# Patient Record
Sex: FEMALE | Race: WHITE | NOT HISPANIC OR LATINO | Employment: UNEMPLOYED | URBAN - METROPOLITAN AREA
[De-identification: names, ages, dates, MRNs, and addresses within clinical notes are randomized per-mention and may not be internally consistent; named-entity substitution may affect disease eponyms.]

---

## 2019-06-12 PROBLEM — N64.52 NIPPLE DISCHARGE: Status: ACTIVE | Noted: 2019-06-12

## 2020-08-20 PROBLEM — J96.01 ACUTE HYPOXEMIC RESPIRATORY FAILURE: Status: ACTIVE | Noted: 2020-08-20

## 2020-08-20 PROBLEM — I10 ESSENTIAL HYPERTENSION: Status: ACTIVE | Noted: 2020-08-20

## 2020-08-20 PROBLEM — R73.9 STEROID-INDUCED HYPERGLYCEMIA: Status: ACTIVE | Noted: 2020-08-20

## 2020-08-20 PROBLEM — F17.200 TOBACCO SMOKER WITHIN LAST 12 MONTHS: Status: ACTIVE | Noted: 2020-08-20

## 2020-08-20 PROBLEM — D72.829 LEUKOCYTOSIS: Status: ACTIVE | Noted: 2020-08-20

## 2020-08-20 PROBLEM — T38.0X5A STEROID-INDUCED HYPERGLYCEMIA: Status: ACTIVE | Noted: 2020-08-20

## 2020-08-20 PROBLEM — E66.01 MORBID OBESITY: Status: ACTIVE | Noted: 2020-08-20

## 2020-08-20 PROBLEM — J45.41 MODERATE PERSISTENT ASTHMA WITH EXACERBATION: Status: ACTIVE | Noted: 2020-08-20

## 2020-08-20 PROBLEM — F31.9 BIPOLAR DISORDER: Status: ACTIVE | Noted: 2020-08-20

## 2020-08-21 PROBLEM — D72.829 LEUKOCYTOSIS: Status: RESOLVED | Noted: 2020-08-20 | Resolved: 2020-08-21

## 2021-05-10 ENCOUNTER — PATIENT MESSAGE (OUTPATIENT)
Dept: RESEARCH | Facility: HOSPITAL | Age: 36
End: 2021-05-10

## 2021-07-01 ENCOUNTER — PATIENT MESSAGE (OUTPATIENT)
Dept: ADMINISTRATIVE | Facility: OTHER | Age: 36
End: 2021-07-01

## 2022-07-27 ENCOUNTER — TELEPHONE (OUTPATIENT)
Dept: INFECTIOUS DISEASES | Facility: CLINIC | Age: 37
End: 2022-07-27

## 2022-07-27 NOTE — TELEPHONE ENCOUNTER
Spoke with Ms Esquivel and advised we need a referral from her PCP or a physician who has treated her and also positive test results and treatments that she has had for the parasites that she states she has been treated.

## 2022-07-27 NOTE — TELEPHONE ENCOUNTER
----- Message from Jorge Acevedo sent at 7/27/2022  3:18 PM CDT -----  Type:  Sooner Appointment Request    Caller is requesting a sooner appointment.  Caller declined first available appointment listed below.  Caller will not accept being placed on the waitlist and is requesting a message be sent to doctor.    Name of Caller:  patient  When is the first available appointment?    Symptoms:    Best Call Back Number:  [phone]  Additional Information:  she needs an appointment

## 2023-05-22 ENCOUNTER — HOSPITAL ENCOUNTER (EMERGENCY)
Facility: HOSPITAL | Age: 38
Discharge: HOME | End: 2023-05-23
Attending: EMERGENCY MEDICINE
Payer: MEDICAID

## 2023-05-22 DIAGNOSIS — J45.901 EXACERBATION OF ASTHMA, UNSPECIFIED ASTHMA SEVERITY, UNSPECIFIED WHETHER PERSISTENT: Primary | ICD-10-CM

## 2023-05-22 LAB
ALBUMIN SERPL-MCNC: 3.3 G/DL (ref 3.5–5)
ALBUMIN/GLOB SERPL: 1.2 RATIO (ref 1.1–2)
ALP SERPL-CCNC: 98 UNIT/L (ref 40–150)
ALT SERPL-CCNC: 16 UNIT/L (ref 0–55)
AST SERPL-CCNC: 11 UNIT/L (ref 5–34)
BASOPHILS # BLD AUTO: 0.05 X10(3)/MCL
BASOPHILS NFR BLD AUTO: 0.4 %
BILIRUBIN DIRECT+TOT PNL SERPL-MCNC: 0.2 MG/DL
BUN SERPL-MCNC: 16.1 MG/DL (ref 7–18.7)
CALCIUM SERPL-MCNC: 8.9 MG/DL (ref 8.4–10.2)
CHLORIDE SERPL-SCNC: 107 MMOL/L (ref 98–107)
CO2 SERPL-SCNC: 25 MMOL/L (ref 22–29)
CREAT SERPL-MCNC: 1.33 MG/DL (ref 0.55–1.02)
EOSINOPHIL # BLD AUTO: 0.23 X10(3)/MCL (ref 0–0.9)
EOSINOPHIL NFR BLD AUTO: 1.8 %
ERYTHROCYTE [DISTWIDTH] IN BLOOD BY AUTOMATED COUNT: 13.5 % (ref 11.5–17)
GFR SERPLBLD CREATININE-BSD FMLA CKD-EPI: 54 MLS/MIN/1.73/M2
GLOBULIN SER-MCNC: 2.8 GM/DL (ref 2.4–3.5)
GLUCOSE SERPL-MCNC: 99 MG/DL (ref 74–100)
HCT VFR BLD AUTO: 38.2 % (ref 37–47)
HGB BLD-MCNC: 12.7 G/DL (ref 12–16)
IMM GRANULOCYTES # BLD AUTO: 0.04 X10(3)/MCL (ref 0–0.04)
IMM GRANULOCYTES NFR BLD AUTO: 0.3 %
LIPASE SERPL-CCNC: 20 U/L
LYMPHOCYTES # BLD AUTO: 5.13 X10(3)/MCL (ref 0.6–4.6)
LYMPHOCYTES NFR BLD AUTO: 40.6 %
MCH RBC QN AUTO: 29.7 PG (ref 27–31)
MCHC RBC AUTO-ENTMCNC: 33.2 G/DL (ref 33–36)
MCV RBC AUTO: 89.3 FL (ref 80–94)
MONOCYTES # BLD AUTO: 0.76 X10(3)/MCL (ref 0.1–1.3)
MONOCYTES NFR BLD AUTO: 6 %
NEUTROPHILS # BLD AUTO: 6.42 X10(3)/MCL (ref 2.1–9.2)
NEUTROPHILS NFR BLD AUTO: 50.9 %
NRBC BLD AUTO-RTO: 0 %
PLATELET # BLD AUTO: 358 X10(3)/MCL (ref 130–400)
PMV BLD AUTO: 9.8 FL (ref 7.4–10.4)
POTASSIUM SERPL-SCNC: 3.7 MMOL/L (ref 3.5–5.1)
PROT SERPL-MCNC: 6.1 GM/DL (ref 6.4–8.3)
RBC # BLD AUTO: 4.28 X10(6)/MCL (ref 4.2–5.4)
SODIUM SERPL-SCNC: 140 MMOL/L (ref 136–145)
TROPONIN I SERPL-MCNC: <0.01 NG/ML (ref 0–0.04)
WBC # SPEC AUTO: 12.63 X10(3)/MCL (ref 4.5–11.5)

## 2023-05-22 PROCEDURE — 63600175 PHARM REV CODE 636 W HCPCS: Performed by: EMERGENCY MEDICINE

## 2023-05-22 PROCEDURE — 96372 THER/PROPH/DIAG INJ SC/IM: CPT | Performed by: PHYSICIAN ASSISTANT

## 2023-05-22 PROCEDURE — 25000242 PHARM REV CODE 250 ALT 637 W/ HCPCS: Performed by: PHYSICIAN ASSISTANT

## 2023-05-22 PROCEDURE — 63600175 PHARM REV CODE 636 W HCPCS: Performed by: PHYSICIAN ASSISTANT

## 2023-05-22 PROCEDURE — 80053 COMPREHEN METABOLIC PANEL: CPT | Performed by: PHYSICIAN ASSISTANT

## 2023-05-22 PROCEDURE — 94640 AIRWAY INHALATION TREATMENT: CPT

## 2023-05-22 PROCEDURE — 84484 ASSAY OF TROPONIN QUANT: CPT | Performed by: PHYSICIAN ASSISTANT

## 2023-05-22 PROCEDURE — 99285 EMERGENCY DEPT VISIT HI MDM: CPT | Mod: 25

## 2023-05-22 PROCEDURE — 83690 ASSAY OF LIPASE: CPT | Performed by: PHYSICIAN ASSISTANT

## 2023-05-22 PROCEDURE — 85025 COMPLETE CBC W/AUTO DIFF WBC: CPT | Performed by: PHYSICIAN ASSISTANT

## 2023-05-22 PROCEDURE — 25000003 PHARM REV CODE 250: Performed by: PHYSICIAN ASSISTANT

## 2023-05-22 PROCEDURE — 94761 N-INVAS EAR/PLS OXIMETRY MLT: CPT

## 2023-05-22 RX ORDER — DEXAMETHASONE SODIUM PHOSPHATE 4 MG/ML
10 INJECTION, SOLUTION INTRA-ARTICULAR; INTRALESIONAL; INTRAMUSCULAR; INTRAVENOUS; SOFT TISSUE
Status: COMPLETED | OUTPATIENT
Start: 2023-05-22 | End: 2023-05-22

## 2023-05-22 RX ORDER — METOCLOPRAMIDE 10 MG/1
10 TABLET ORAL
Status: COMPLETED | OUTPATIENT
Start: 2023-05-22 | End: 2023-05-22

## 2023-05-22 RX ORDER — IPRATROPIUM BROMIDE AND ALBUTEROL SULFATE 2.5; .5 MG/3ML; MG/3ML
3 SOLUTION RESPIRATORY (INHALATION)
Status: COMPLETED | OUTPATIENT
Start: 2023-05-22 | End: 2023-05-22

## 2023-05-22 RX ORDER — IPRATROPIUM BROMIDE AND ALBUTEROL SULFATE 2.5; .5 MG/3ML; MG/3ML
3 SOLUTION RESPIRATORY (INHALATION)
Status: DISCONTINUED | OUTPATIENT
Start: 2023-05-22 | End: 2023-05-22

## 2023-05-22 RX ADMIN — IPRATROPIUM BROMIDE AND ALBUTEROL SULFATE 3 ML: .5; 3 SOLUTION RESPIRATORY (INHALATION) at 11:05

## 2023-05-22 RX ADMIN — DEXAMETHASONE SODIUM PHOSPHATE 10 MG: 4 INJECTION, SOLUTION INTRA-ARTICULAR; INTRALESIONAL; INTRAMUSCULAR; INTRAVENOUS; SOFT TISSUE at 11:05

## 2023-05-22 RX ADMIN — IPRATROPIUM BROMIDE AND ALBUTEROL SULFATE 3 ML: .5; 3 SOLUTION RESPIRATORY (INHALATION) at 10:05

## 2023-05-22 RX ADMIN — METOCLOPRAMIDE 10 MG: 10 TABLET ORAL at 11:05

## 2023-05-22 RX ADMIN — PREDNISONE 60 MG: 50 TABLET ORAL at 11:05

## 2023-05-23 VITALS
HEART RATE: 87 BPM | OXYGEN SATURATION: 98 % | DIASTOLIC BLOOD PRESSURE: 91 MMHG | SYSTOLIC BLOOD PRESSURE: 128 MMHG | RESPIRATION RATE: 17 BRPM | BODY MASS INDEX: 51.91 KG/M2 | WEIGHT: 293 LBS | HEIGHT: 63 IN | TEMPERATURE: 98 F

## 2023-05-23 RX ORDER — PREDNISONE 20 MG/1
40 TABLET ORAL DAILY
Qty: 10 TABLET | Refills: 0 | Status: SHIPPED | OUTPATIENT
Start: 2023-05-23 | End: 2023-05-28

## 2023-05-23 RX ORDER — METOCLOPRAMIDE 10 MG/1
10 TABLET ORAL EVERY 6 HOURS PRN
Qty: 8 TABLET | Refills: 0 | Status: SHIPPED | OUTPATIENT
Start: 2023-05-23

## 2023-05-23 RX ORDER — ALBUTEROL SULFATE 90 UG/1
1-2 AEROSOL, METERED RESPIRATORY (INHALATION) EVERY 4 HOURS PRN
Qty: 6.7 G | Refills: 1 | Status: SHIPPED | OUTPATIENT
Start: 2023-05-23

## 2023-05-23 NOTE — ED PROVIDER NOTES
"Encounter Date: 5/22/2023       History     Chief Complaint   Patient presents with    Shortness of Breath     Pt reports to the ed c/o a "asthma attack" (x)30 minutes. Mo75=349% on room air. (+) wheezing. Vomited (x)1 in triage. Vss. nadn     Patient with pmhx of HTN and asthma presents today c/o asthma exacerbation that started just prior to arrival. Patient says she ate dinner at a local chinese buffet tonight and starting vomiting an hour or so after eating. After she was done vomiting she began to wheeze and feel short of breath. She doesn't have her rescue inhaler with her so has not used any of her asthma medication since exacerbation started. Denies fever, chills, abdominal pain, diarrhea, constipation.     The history is provided by the patient. No  was used.   Review of patient's allergies indicates:   Allergen Reactions    Asa [aspirin]     Ultram [tramadol]     Zofran [ondansetron hcl (pf)]      Past Medical History:   Diagnosis Date    Asthma     Hypertension     Manic bipolar I disorder     Mental health disorder     Substance abuse      Past Surgical History:   Procedure Laterality Date    APPENDECTOMY      ESOPHAGOGASTRODUODENOSCOPY N/A 9/15/2022    Procedure: EGD (ESOPHAGOGASTRODUODENOSCOPY);  Surgeon: Law Sheriff MD;  Location: [facility];  Service: Endoscopy;  Laterality: N/A;    HYSTERECTOMY      TONSILLECTOMY       No family history on file.  Social History     Tobacco Use    Smoking status: Former     Packs/day: 1.00     Types: Cigarettes    Smokeless tobacco: Never   Substance Use Topics    Alcohol use: Not Currently    Drug use: Yes     Types: Marijuana     Comment: daily     Review of Systems   Constitutional:  Negative for chills and fever.   Respiratory:  Positive for shortness of breath and wheezing. Negative for cough and chest tightness.    Cardiovascular:  Negative for chest pain, palpitations and leg swelling.   Gastrointestinal:  Positive for nausea and " vomiting. Negative for abdominal pain, constipation and diarrhea.   Genitourinary:  Negative for dysuria, flank pain and hematuria.   Musculoskeletal:  Negative for arthralgias and myalgias.   Skin:  Negative for rash.   Neurological:  Negative for syncope, light-headedness and headaches.   All other systems reviewed and are negative.    Physical Exam     Initial Vitals [05/22/23 2247]   BP Pulse Resp Temp SpO2   (!) 131/109 85 (!) 24 98.4 °F (36.9 °C) 100 %      MAP       --         Physical Exam    Nursing note and vitals reviewed.  Constitutional: She appears well-developed and well-nourished. She is not diaphoretic. No distress. She is not intubated.   HENT:   Head: Normocephalic and atraumatic.   Mouth/Throat: Oropharynx is clear and moist. No oropharyngeal exudate.   Eyes: Conjunctivae and EOM are normal.   Neck: Neck supple.   Normal range of motion.  Cardiovascular:  Normal rate, regular rhythm, normal heart sounds and intact distal pulses.           Pulmonary/Chest: Effort normal. No accessory muscle usage. She is not intubated. No respiratory distress. She has wheezes in the right upper field, the right middle field, the right lower field, the left upper field, the left middle field and the left lower field.   Abdominal: Abdomen is soft. Bowel sounds are normal. She exhibits no distension. There is no abdominal tenderness. There is no rebound.   Musculoskeletal:         General: No edema. Normal range of motion.      Cervical back: Normal range of motion and neck supple.     Neurological: She is alert and oriented to person, place, and time. GCS score is 15. GCS eye subscore is 4. GCS verbal subscore is 5. GCS motor subscore is 6.   Skin: Skin is warm and dry. Capillary refill takes less than 2 seconds. No rash noted.   Psychiatric: She has a normal mood and affect.       ED Course   Procedures  Labs Reviewed   COMPREHENSIVE METABOLIC PANEL - Abnormal; Notable for the following components:       Result  Value    Creatinine 1.33 (*)     Protein Total 6.1 (*)     Albumin Level 3.3 (*)     All other components within normal limits   CBC WITH DIFFERENTIAL - Abnormal; Notable for the following components:    WBC 12.63 (*)     Lymph # 5.13 (*)     All other components within normal limits   TROPONIN I - Normal   LIPASE - Normal   CBC W/ AUTO DIFFERENTIAL    Narrative:     The following orders were created for panel order CBC auto differential.  Procedure                               Abnormality         Status                     ---------                               -----------         ------                     CBC with Differential[[id number]]        Abnormal            Final result                 Please view results for these tests on the individual orders.     EKG Readings: (Independently Interpreted)   Initial Reading: No STEMI. Rhythm: Normal Sinus Rhythm. Heart Rate: 87. Ectopy: No Ectopy. Conduction: Normal. Axis: Normal.     Imaging Results              X-Ray Chest PA And Lateral (Preliminary result)  Result time 05/23/23 00:22:47      Wet Read by HUGO Morales (05/23/23 00:22:47, [facility], Emergency Medicine)    No acute abnormality                                      Medications   predniSONE tablet 60 mg (60 mg Oral Given 5/22/23 2315)   albuterol-ipratropium 2.5 mg-0.5 mg/3 mL nebulizer solution 3 mL (3 mLs Nebulization Given 5/22/23 2300)   dexAMETHasone injection 10 mg (10 mg Intramuscular Given 5/22/23 2311)   metoclopramide HCl tablet 10 mg (10 mg Oral Given 5/22/23 2312)     Medical Decision Making:   Initial Assessment:   Patient with hx of asthma presents today c/o shortness of breath   Differential Diagnosis:   Asthma exacerbation, URI, CAP  Clinical Tests:   Lab Tests: Reviewed  The following lab test(s) were unremarkable: CBC, CMP, Troponin and Lipase  Radiological Study: Reviewed  ED Management:  Duonebsx3, decadron, and reglan  Patient is non-toxic  appearing. Vitals are stable. Symptoms significantly improved after tx in ED and she is feeling better. Wheezing decreased upon re-examination. Will provide patient with rx for rescue inhaler, steroids, and antiemetic. She is going home tomorrow where she has her neb machine and medication. Declined rx for that tonight. Advised her to f/u with pcp. She is stable for discharge. ED precautions given.   Additional MDM:   PERC Rule:   Age is greater than or equal to 50 = 0.0  Heart Rate is greater than or equal to 100 = 0.0  SaO2 on room air < 95% = 0.0  Unilateral leg swelling = 0.0  Hemoptysis = 0.0  Recent surgery or trauma = 0.0  Prior PE or DVT =  0.0  Hormone use = 0.00  PERC Score = 0    Well's Criteria Score:  -Clinical symptoms of DVT (leg swelling, pain with palpation) = 0.0  -Other diagnosis less likely than pulmonary embolism =            0.0  -Heart Rate >100 =   0.0  -Immobilization (= or > than 3 days) or surgery in the previous 4 weeks = 0.0  -Previous DVT/PE = 0.0  -Hemoptysis =          0.0  -Malignancy =           0.0  Well's Probability Score =    0                     Clinical Impression:   Final diagnoses:  [J45.901] Exacerbation of asthma, unspecified asthma severity, unspecified whether persistent (Primary)        ED Disposition Condition    Discharge Stable          ED Prescriptions       Medication Sig Dispense Start Date End Date Auth. Provider    albuterol (PROVENTIL/VENTOLIN HFA) 90 mcg/actuation inhaler Inhale 1-2 puffs into the lungs every 4 (four) hours as needed for Wheezing or Shortness of Breath. Rescue 6.7 g 5/23/2023 -- HUGO Morales    predniSONE (DELTASONE) 20 MG tablet Take 2 tablets (40 mg total) by mouth once daily. for 5 days 10 tablet 5/23/2023 5/28/2023 HUGO Morales    metoclopramide HCl (REGLAN) 10 MG tablet Take 1 tablet (10 mg total) by mouth every 6 (six) hours as needed (nausea). 8 tablet 5/23/2023 -- HUGO Morales          Follow-up  Information       Follow up With Specialties Details Why Contact Info    Jorge Lomeli, GUERAP-C Family Medicine In 2 days  [address] [place] [address]  [phone]      [facility] Emergency Medicine  If symptoms worsen return to ED immediately [address] [facility] [id number] [phone]             HUGO Morales  05/23/23 0109

## 2024-02-24 ENCOUNTER — HOSPITAL ENCOUNTER (EMERGENCY)
Facility: OTHER | Age: 39
Discharge: HOME | End: 2024-02-24
Attending: EMERGENCY MEDICINE
Payer: COMMERCIAL

## 2024-02-24 VITALS
HEART RATE: 82 BPM | TEMPERATURE: 98 F | RESPIRATION RATE: 18 BRPM | OXYGEN SATURATION: 99 % | BODY MASS INDEX: 65.69 KG/M2 | WEIGHT: 293 LBS | SYSTOLIC BLOOD PRESSURE: 138 MMHG | DIASTOLIC BLOOD PRESSURE: 88 MMHG

## 2024-02-24 DIAGNOSIS — G43.109 COMPLICATED MIGRAINE: Primary | ICD-10-CM

## 2024-02-24 DIAGNOSIS — R20.2 PARESTHESIAS: ICD-10-CM

## 2024-02-24 DIAGNOSIS — H54.7 VISION PROBLEM: ICD-10-CM

## 2024-02-24 DIAGNOSIS — R29.818 ACUTE FOCAL NEUROLOGICAL DEFICIT: ICD-10-CM

## 2024-02-24 PROBLEM — R20.0 RIGHT FACIAL NUMBNESS: Status: ACTIVE | Noted: 2024-02-24

## 2024-02-24 LAB
ALBUMIN SERPL BCP-MCNC: 4.1 G/DL (ref 3.5–5.2)
ALP SERPL-CCNC: 105 U/L (ref 55–135)
ALT SERPL W/O P-5'-P-CCNC: 42 U/L (ref 10–44)
ANION GAP SERPL CALC-SCNC: 12 MMOL/L (ref 8–16)
AST SERPL-CCNC: 35 U/L (ref 10–40)
BASOPHILS # BLD AUTO: 0.04 K/UL (ref 0–0.2)
BASOPHILS NFR BLD: 0.4 % (ref 0–1.9)
BILIRUB SERPL-MCNC: 0.6 MG/DL (ref 0.1–1)
BUN SERPL-MCNC: 11 MG/DL (ref 6–20)
CALCIUM SERPL-MCNC: 10.2 MG/DL (ref 8.7–10.5)
CHLORIDE SERPL-SCNC: 107 MMOL/L (ref 95–110)
CHOLEST SERPL-MCNC: 151 MG/DL (ref 120–199)
CHOLEST/HDLC SERPL: 4.2 {RATIO} (ref 2–5)
CO2 SERPL-SCNC: 18 MMOL/L (ref 23–29)
CREAT SERPL-MCNC: 0.9 MG/DL (ref 0.5–1.4)
CREAT SERPL-MCNC: 0.9 MG/DL (ref 0.5–1.4)
DIFFERENTIAL METHOD BLD: NORMAL
EOSINOPHIL # BLD AUTO: 0.1 K/UL (ref 0–0.5)
EOSINOPHIL NFR BLD: 1.1 % (ref 0–8)
ERYTHROCYTE [DISTWIDTH] IN BLOOD BY AUTOMATED COUNT: 13.6 % (ref 11.5–14.5)
EST. GFR  (NO RACE VARIABLE): >60 ML/MIN/1.73 M^2
GLUCOSE SERPL-MCNC: 134 MG/DL (ref 70–110)
HCT VFR BLD AUTO: 44.6 % (ref 37–48.5)
HDLC SERPL-MCNC: 36 MG/DL (ref 40–75)
HDLC SERPL: 23.8 % (ref 20–50)
HGB BLD-MCNC: 15 G/DL (ref 12–16)
IMM GRANULOCYTES # BLD AUTO: 0.02 K/UL (ref 0–0.04)
IMM GRANULOCYTES NFR BLD AUTO: 0.2 % (ref 0–0.5)
INR PPP: 1 (ref 0.8–1.2)
LDLC SERPL CALC-MCNC: 94.8 MG/DL (ref 63–159)
LYMPHOCYTES # BLD AUTO: 3.3 K/UL (ref 1–4.8)
LYMPHOCYTES NFR BLD: 32.9 % (ref 18–48)
MCH RBC QN AUTO: 29.7 PG (ref 27–31)
MCHC RBC AUTO-ENTMCNC: 33.6 G/DL (ref 32–36)
MCV RBC AUTO: 88 FL (ref 82–98)
MONOCYTES # BLD AUTO: 0.6 K/UL (ref 0.3–1)
MONOCYTES NFR BLD: 6.3 % (ref 4–15)
NEUTROPHILS # BLD AUTO: 6 K/UL (ref 1.8–7.7)
NEUTROPHILS NFR BLD: 59.1 % (ref 38–73)
NONHDLC SERPL-MCNC: 115 MG/DL
NRBC BLD-RTO: 0 /100 WBC
PLATELET # BLD AUTO: 404 K/UL (ref 150–450)
PMV BLD AUTO: 9.7 FL (ref 9.2–12.9)
POC PTINR: 1.4 (ref 0.9–1.2)
POCT GLUCOSE: 137 MG/DL (ref 70–110)
POTASSIUM SERPL-SCNC: 4.2 MMOL/L (ref 3.5–5.1)
PROT SERPL-MCNC: 8 G/DL (ref 6–8.4)
PROTHROMBIN TIME: 11.4 SEC (ref 9–12.5)
RBC # BLD AUTO: 5.05 M/UL (ref 4–5.4)
SAMPLE: ABNORMAL
SAMPLE: NORMAL
SODIUM SERPL-SCNC: 137 MMOL/L (ref 136–145)
TRIGL SERPL-MCNC: 101 MG/DL (ref 30–150)
TSH SERPL DL<=0.005 MIU/L-ACNC: 1.48 UIU/ML (ref 0.4–4)
WBC # BLD AUTO: 10.08 K/UL (ref 3.9–12.7)

## 2024-02-24 PROCEDURE — 84443 ASSAY THYROID STIM HORMONE: CPT | Performed by: EMERGENCY MEDICINE

## 2024-02-24 PROCEDURE — 85610 PROTHROMBIN TIME: CPT

## 2024-02-24 PROCEDURE — 80053 COMPREHEN METABOLIC PANEL: CPT | Performed by: EMERGENCY MEDICINE

## 2024-02-24 PROCEDURE — 25500020 PHARM REV CODE 255: Performed by: EMERGENCY MEDICINE

## 2024-02-24 PROCEDURE — 99285 EMERGENCY DEPT VISIT HI MDM: CPT | Mod: 25

## 2024-02-24 PROCEDURE — 82962 GLUCOSE BLOOD TEST: CPT

## 2024-02-24 PROCEDURE — 96374 THER/PROPH/DIAG INJ IV PUSH: CPT

## 2024-02-24 PROCEDURE — 85610 PROTHROMBIN TIME: CPT | Performed by: EMERGENCY MEDICINE

## 2024-02-24 PROCEDURE — 99284 EMERGENCY DEPT VISIT MOD MDM: CPT | Mod: 95,,, | Performed by: PSYCHIATRY & NEUROLOGY

## 2024-02-24 PROCEDURE — 80061 LIPID PANEL: CPT | Performed by: EMERGENCY MEDICINE

## 2024-02-24 PROCEDURE — 96375 TX/PRO/DX INJ NEW DRUG ADDON: CPT

## 2024-02-24 PROCEDURE — 85025 COMPLETE CBC W/AUTO DIFF WBC: CPT | Performed by: EMERGENCY MEDICINE

## 2024-02-24 PROCEDURE — 93010 ELECTROCARDIOGRAM REPORT: CPT | Mod: ,,, | Performed by: INTERNAL MEDICINE

## 2024-02-24 PROCEDURE — 63600175 PHARM REV CODE 636 W HCPCS: Performed by: EMERGENCY MEDICINE

## 2024-02-24 PROCEDURE — 99900035 HC TECH TIME PER 15 MIN (STAT)

## 2024-02-24 PROCEDURE — 36415 COLL VENOUS BLD VENIPUNCTURE: CPT | Performed by: EMERGENCY MEDICINE

## 2024-02-24 PROCEDURE — 93005 ELECTROCARDIOGRAM TRACING: CPT

## 2024-02-24 RX ORDER — PROCHLORPERAZINE EDISYLATE 5 MG/ML
10 INJECTION INTRAMUSCULAR; INTRAVENOUS
Status: COMPLETED | OUTPATIENT
Start: 2024-02-24 | End: 2024-02-24

## 2024-02-24 RX ORDER — DIPHENHYDRAMINE HYDROCHLORIDE 50 MG/ML
25 INJECTION INTRAMUSCULAR; INTRAVENOUS
Status: COMPLETED | OUTPATIENT
Start: 2024-02-24 | End: 2024-02-24

## 2024-02-24 RX ADMIN — IOHEXOL 100 ML: 350 INJECTION, SOLUTION INTRAVENOUS at 10:02

## 2024-02-24 RX ADMIN — DIPHENHYDRAMINE HYDROCHLORIDE 25 MG: 50 INJECTION, SOLUTION INTRAMUSCULAR; INTRAVENOUS at 12:02

## 2024-02-24 RX ADMIN — PROCHLORPERAZINE EDISYLATE 10 MG: 5 INJECTION INTRAMUSCULAR; INTRAVENOUS at 12:02

## 2024-02-24 NOTE — ED PROVIDER NOTES
"Encounter Date: 2/24/2024       History     Chief Complaint   Patient presents with    Numbness     Pt reporting numbness to R side of face with blurred vision to R eye, confusion, dizziness, generalized weakness, "trouble finding words", and nausea onset 8:30 AM     38-year-old female presents as a stroke code.  She reports around 830 this morning she began feeling confused and noticed that her right cheek "felt funny and numb".  She states there is visual loss in the right eye lateral visual fields.  She states she is having difficulty finding her words.  There is mild right-sided headache.  She has no history of hypertension or diabetes or high cholesterol.  No recent trauma or illness.    The history is provided by the patient.     Review of patient's allergies indicates:   Allergen Reactions    Asa [aspirin]     Aspirin Other (See Comments)     avoids due to asthma    Ultram [tramadol]     Ultram [tramadol] Nausea And Vomiting    Zofran [ondansetron hcl (pf)]     Zofran [ondansetron hcl (pf)] Other (See Comments)     Gives migraines     Past Medical History:   Diagnosis Date    Anxiety     Asthma     Bipolar affective disorder, current episode manic with psychotic symptoms     Hypertension     Manic bipolar I disorder     Mental health disorder     PTSD (post-traumatic stress disorder)     Substance abuse      Past Surgical History:   Procedure Laterality Date    APPENDECTOMY      c sections      x 3    CHOLECYSTECTOMY      ESOPHAGOGASTRODUODENOSCOPY N/A 9/15/2022    Procedure: EGD (ESOPHAGOGASTRODUODENOSCOPY);  Surgeon: Law Sheriff MD;  Location: [facility];  Service: Endoscopy;  Laterality: N/A;    HYSTERECTOMY      TONSILLECTOMY      tonsils and adenoidectomy       Family History   Problem Relation Age of Onset    Ovarian cancer Mother     Cancer Other         breast    No Known Problems Father     Diabetes Paternal Grandmother      Social History     Tobacco Use    Smoking status: Every Day     " Current packs/day: 0.50     Types: Cigarettes    Smokeless tobacco: Never   Substance Use Topics    Alcohol use: Never    Drug use: Yes     Types: Marijuana     Comment: daily     Review of Systems   Constitutional:  Negative for chills and fever.   HENT:  Negative for congestion and sore throat.    Eyes:  Positive for visual disturbance.   Respiratory:  Negative for cough and shortness of breath.    Cardiovascular:  Negative for chest pain and palpitations.   Gastrointestinal:  Negative for abdominal pain, diarrhea and vomiting.   Genitourinary:  Negative for decreased urine volume, dysuria and vaginal discharge.   Musculoskeletal:  Negative for joint swelling, neck pain and neck stiffness.   Skin:  Negative for rash and wound.   Neurological:  Positive for speech difficulty, numbness and headaches. Negative for weakness.   Psychiatric/Behavioral:  Positive for confusion.        Physical Exam     Initial Vitals   BP Pulse Resp Temp SpO2   02/24/24 1014 02/24/24 1014 02/24/24 1014 02/24/24 1126 02/24/24 1014   (!) 164/84 98 (!) 24 97.9 °F (36.6 °C) 98 %      MAP       --                Physical Exam    Nursing note and vitals reviewed.  Constitutional: She appears well-developed and well-nourished. She appears distressed (Crying).   Obese.   HENT:   Head: Normocephalic and atraumatic.   Mouth/Throat: Oropharynx is clear and moist. No oropharyngeal exudate.   Eyes: Conjunctivae and EOM are normal. Pupils are equal, round, and reactive to light.   Neck: Neck supple.   Cardiovascular:  Normal rate and normal heart sounds.           No murmur heard.  Pulmonary/Chest: Breath sounds normal. No respiratory distress. She has no wheezes. She has no rhonchi. She has no rales.   Abdominal: Abdomen is soft. There is no abdominal tenderness. There is no rebound and no guarding.   Musculoskeletal:         General: No tenderness or edema.      Cervical back: Neck supple.     Neurological: She is alert and oriented to person,  place, and time. She has normal strength. A sensory deficit (Reports decreased sensation right V2 and V3 dermatomes.) is present. GCS score is 15. GCS eye subscore is 4. GCS verbal subscore is 5. GCS motor subscore is 6.   Reports decreased vision in right lateral visual field- inconsistent on testing.   Skin: Skin is warm and dry. No rash noted.   Psychiatric: Thought content normal. Her mood appears anxious. Her affect is labile. Her speech is delayed (Stuttering).         ED Course   Critical Care    Date/Time: 2/24/2024 12:14 PM    Performed by: Ana Pierre MD  Authorized by: Ana Pierre MD  Direct patient critical care time: 12 minutes  Additional history critical care time: 6 minutes  Ordering / reviewing critical care time: 6 minutes  Documentation critical care time: 6 minutes  Consulting other physicians critical care time: 6 minutes  Total critical care time (exclusive of procedural time) : 36 minutes  Critical care time was exclusive of separately billable procedures and treating other patients.  Critical care was necessary to treat or prevent imminent or life-threatening deterioration of the following conditions: CNS failure or compromise.  Critical care was time spent personally by me on the following activities: blood draw for specimens, development of treatment plan with patient or surrogate, discussions with consultants, interpretation of cardiac output measurements, evaluation of patient's response to treatment, examination of patient, obtaining history from patient or surrogate, ordering and performing treatments and interventions, ordering and review of laboratory studies, ordering and review of radiographic studies, pulse oximetry, re-evaluation of patient's condition and review of old charts.        Labs Reviewed   COMPREHENSIVE METABOLIC PANEL - Abnormal; Notable for the following components:       Result Value    CO2 18 (*)     Glucose 134 (*)     All other components within normal  limits   POCT GLUCOSE - Abnormal; Notable for the following components:    POCT Glucose 137 (*)     All other components within normal limits   ISTAT PROCEDURE - Abnormal; Notable for the following components:    POC PTINR 1.4 (*)     All other components within normal limits   CBC W/ AUTO DIFFERENTIAL   TSH   PROTIME-INR   LIPID PANEL   POCT GLUCOSE, HAND-HELD DEVICE   ISTAT CREATININE     EKG Readings: (Independently Interpreted)   Normal sinus rhythm at rate of 91, no STEMI, no ectopy.       Imaging Results              MRI Brain Without Contrast (Final result)  Result time 02/24/24 14:43:19      Final result by Teo Galeano MD (02/24/24 14:43:19)                   Impression:      Examination compromised by artifact.  Noting this, no definite acute intracranial findings identified.  No evidence of acute ischemia or recent infarction.      Electronically signed by: Teo Galeano  Date:    02/24/2024  Time:    14:43               Narrative:    EXAMINATION:  MRI BRAIN WITHOUT CONTRAST    CLINICAL HISTORY:  Stroke, follow up;    TECHNIQUE:  Multiplanar multisequence MR imaging of the brain was performed without contrast.    COMPARISON:  CTA of the head neck performed 02/24/2024.    FINDINGS:  Comment: Examination related to artifact, reportedly due to patient body habitus and positioning, per MR technologist note.    Parenchyma: There is no restricted diffusion to suggest acute or subacute ischemic infarct.    Additional comments: There is no midline shift, abnormal extra-axial fluid collection, or acute intracranial hemorrhage. The basal cisterns are patent.    Ventricles: Normal.    Flow voids: The normal major intracranial arterial flow voids are visualized.    Sinuses and mastoid air cells: Maxillary sinuses appear relatively hypoplastic.  Paranasal sinuses are better assessed on earlier same day CT imaging.    Orbits: Normal    Midline structures: The pituitary and craniocervical junction are  normal.    Marrow: Normal                                         CTA Head and Neck (xpd) (Final result)  Result time 02/24/24 11:09:07      Final result by Fernie Fernandez MD (02/24/24 11:09:07)                   Impression:      No acute intracranial process.    CTA  mildly limited by motion.    No significant stenosis at the carotid bifurcations by NASCET criteria the vertebral arteries are patent without advanced stenosis.  No evidence of dissection.    No major branch stenosis/occlusion at the [id number].  The  ophthalmic arteries are patent at least proximally.      Electronically signed by: Fernei Fernandez  Date:    02/24/2024  Time:    11:09               Narrative:    EXAMINATION:  CTA HEAD AND NECK (XPD)    CLINICAL HISTORY:  Neuro deficit, acute, stroke suspected;    TECHNIQUE:  Non contrast low dose axial images were obtained through the head.  CT angiogram was performed from the level of the kathe to the top of the head following the IV administration of 100mL of Omnipaque 350.   Sagittal and coronal reconstructions and maximum intensity projection reconstructions were performed. Arterial stenosis percentages are based on NASCET measurement criteria.    3D reformats were created on an independent workstation to evaluate the [id number]    COMPARISON:  None    FINDINGS:  Brain:    There is no evidence of hydrocephalus mass effect intracranial hemorrhage or acute territorial infarct.    No enhancing intracranial lesion is identified.  The orbits are grossly unremarkable.  No evidence of mass in the sella.    CTA:    Mildly limited by motion.    There is no advanced stenosis at the origins of the vessels from the aortic arch.    No advanced stenosis at the origin of the vertebral arteries from the subclavian arteries. No advanced stenosis of the vertebral arteries in the neck.    There is no significant stenosis at the carotid bifurcations by NASCET criteria.  Retropharyngeal course of the  carotid arteries bilaterally.    No evidence of dissection.    Intracranially there is no major branch advanced stenosis/occlusion at the [place].  The ophthalmic arteries are patent at least proximally.    No aneurysm. The venous sinuses are patent.    No soft tissue mass is identified in the neck.    These findings were communicated to Dr. Pierre at 10:59 on 02/24/2024.                                       Medications   iohexoL (OMNIPAQUE 350) injection 100 mL (100 mLs Intravenous Given 2/24/24 1050)   diphenhydrAMINE injection 25 mg (25 mg Intravenous Given 2/24/24 1247)   prochlorperazine injection Soln 10 mg (10 mg Intravenous Given 2/24/24 1247)     Medical Decision Making  Emergent evaluation of 38-year-old female who presents as a stroke code with symptoms of visual field deficit, facial numbness, expressive aphasia.  Vital signs are benign, afebrile.  On exam she is tearful, crying, with stuttering speech.  She reports decreased sensation to light touch over right V2 and V3 dermatomes, but there is no associated facial droop or weakness of the facial muscles.  Difficult to assess visual fields, but reports decreased right lateral.  CTA head and neck shows no acute process or large vessel occlusion.  Patient was seen by telemedicine stroke Neurology Dr. Morris for consultation, and he agrees with me that tPA risk outweighs benefit.  This may represent an atypical migraine or conversion disorder, but unlikely to be a true CVA.  Will check MRI for complete evaluation.    Patient was over MRI weight capacity, transferred to [facility] [place] [place] for MRI and then returned to [facility].  MRI shows no CVA.  Patient's speech symptoms have resolved completely, sensory changes to the face of resolved completely, but she continues to report right eye visual field deficit.  Visual acuity is benign.  She can follow-up with her ophthalmologist this week if symptoms persist, but this may represent part of migraine aura.  I am  comfortable with discharge home and she is advised close follow-up and strict return precautions.    Amount and/or Complexity of Data Reviewed  Labs: ordered. Decision-making details documented in ED Course.     Details: CBC showed no leukocytosis or profound anemia.  Metabolic profile shows no major electrolyte disturbance or renal insufficiency.  TSH is within normal limits.  Coagulation studies are within normal limits.  Radiology: ordered.  Discussion of management or test interpretation with external provider(s): I discussed the case at length with Dr. Mariano - telemedicine stroke neurologist.  I discussed the case briefly with Dr. Church - accepting ER physician at [facility] for MRI transfer.    Risk  Prescription drug management.  Decision regarding hospitalization.                                      Clinical Impression:  Final diagnoses:  [R29.818] Acute focal neurological deficit  [G43.109] Complicated migraine (Primary)  [R20.2] Paresthesias  [H54.7] Vision problem          ED Disposition Condition    Discharge Stable          ED Prescriptions    None       Follow-up Information       Follow up With Specialties Details Why Contact Info    Your regular primary care doctor  Schedule an appointment as soon as possible for a visit  For symptom recheck and close follow-up     [facility] Emergency Medicine  As needed, If symptoms worsen [address]  [facility] [id number] [phone]             Ana Pierre MD  02/24/24 [id number]

## 2024-02-24 NOTE — ED TRIAGE NOTES
Pt presents to the ED with c/o expressive asphasia, dizziness, blurred vision, numbness to right side, nausea, onset 8:30 am today. Pt able to move bilateral arms, no vomiting. Pt AAOx3, NAD noted.

## 2024-02-24 NOTE — SUBJECTIVE & OBJECTIVE
HPI:  38 y.o. female with R facial numbness, RUE heaviness, word-finding difficulty and blurry vision   There is now a right sided, throbbing headache, nausea, +photo/phonophobia and vertigo.  No history of similar symptoms. Has remote migraine.       Images personally reviewed and interpreted:  Study: Head CT and CTA Head & Neck  Study Interpretation: nl     BP (!) 164/84   Pulse 98   Resp (!) 24   Wt (!) 168.2 kg (370 lb 13 oz)   SpO2 98%   BMI 65.69 kg/m²   Some stuttering/dysfluency with nl naming  Poor effort.  Protects her face with RUE    Assessment and plan:  Suspected migraine with embellishment.  Conversion d/o and brainstem ischemic stroke in DDx.  My index of suspicion of the latter is very low given the embellishment on exam so I do not recommend thrombolytic therapy without MRI confirmation.  We will proceed with stat MRI brain.  Please call me back if abnl.  In the interim, please treat for migraine (cocktail of choice).    Lytics recommendation: Thrombolytic therapy not recommended due to Suspected stroke mimic  and Mild Non-Disabling Symptoms  Thrombectomy recommendation: No; no significant neurologic deficit (NIHSS <6)  and No; at this time symptoms not suggestive of large vessel occlusion  Placement recommendation: pending further studies

## 2024-02-24 NOTE — TELEMEDICINE CONSULT
[facility]  Vascular Neurology  Comprehensive Stroke Center  TeleVascular Neurology Acute Consultation Note        Consult Information  Consults    Consulting Provider: SAPNA RINALDI   Current Providers  No providers found    Patient Location:  [facility] EMERGENCY DEPARTMENT Emergency Department    Spoke hospital nurse at bedside with patient assisting consultant.  Patient information was obtained from patient.       Stroke Documentation  Acute Stroke Times   Last Known Normal Date: 02/24/24  Last Known Normal Time: 0830  Stroke Team Arrival Date: 02/24/24  Stroke Team Arrival Time: 1021  CT Interpretation Time: 1056    NIH Scale:  Interval: baseline  1a. Level of Consciousness: 0-->Alert, keenly responsive  1b. LOC Questions: 0-->Answers both questions correctly  1c. LOC Commands: 0-->Performs both tasks correctly  2. Best Gaze: 0-->Normal  3. Visual: 0-->No visual loss  4. Facial Palsy: 0-->Normal symmetrical movements  5a. Motor Arm, Left: 0-->No drift, limb holds 90 (or 45) degrees for full 10 secs  5b. Motor Arm, Right: 1-->Drift, limb holds 90 (or 45) degrees, but drifts down before full 10 secs, does not hit bed or other support  6a. Motor Leg, Left: 1-->Drift, leg falls by the end of the 5-sec period but does not hit bed  6b. Motor Leg, Right: 1-->Drift, leg falls by the end of the 5-sec period but does not hit bed  7. Limb Ataxia: 0-->Absent  8. Sensory: 1-->Mild-to-moderate sensory loss, patient feels pinprick is less sharp or is dull on the affected side, or there is a loss of superficial pain with pinprick, but patient is aware of being touched (decreased on R)  9. Best Language: 1-->Mild-to-moderate aphasia, some obvious loss of fluency or facility of comprehension, without significant limitation on ideas expressed or form of expression. Reduction of speech and/or comprehension, however, makes conversation. . . (see row details)  10. Dysarthria: 0-->Normal  11. Extinction and  Inattention (formerly Neglect): 0-->No abnormality  Total (NIH Stroke Scale): 5      Modified [place]:    [place] Coma Scale:     ABCD2 Score:    [id number] Score:    HAS -BLED Score:    ICH Score:    Hunt & Harrell Classification:      Blood pressure (!) 164/84, pulse 98, resp. rate (!) 24, weight (!) 168.2 kg (370 lb 13 oz), SpO2 98 %.    Van Positive    Medical Decision Making  HPI:  38 y.o. female with R facial numbness, RUE heaviness, word-finding difficulty and blurry vision   There is now a right sided, throbbing headache, nausea, +photo/phonophobia and vertigo.  No history of similar symptoms. Has remote migraine.       Images personally reviewed and interpreted:  Study: Head CT and CTA Head & Neck  Study Interpretation: nl     BP (!) 164/84   Pulse 98   Resp (!) 24   Wt (!) 168.2 kg (370 lb 13 oz)   SpO2 98%   BMI 65.69 kg/m²   Some stuttering/dysfluency with nl naming  Poor effort.  Protects her face with RUE    Assessment and plan:  Suspected migraine with embellishment.  Conversion d/o and brainstem ischemic stroke in DDx.  My index of suspicion of the latter is very low given the embellishment on exam so I do not recommend thrombolytic therapy without MRI confirmation.  We will proceed with stat MRI brain.  Please call me back if abnl.  In the interim, please treat for migraine (cocktail of choice).    Lytics recommendation: Thrombolytic therapy not recommended due to Suspected stroke mimic  and Mild Non-Disabling Symptoms  Thrombectomy recommendation: No; no significant neurologic deficit (NIHSS <6)  and No; at this time symptoms not suggestive of large vessel occlusion  Placement recommendation: pending further studies               ROS  Physical Exam  Past Medical History:   Diagnosis Date    Anxiety     Asthma     Bipolar affective disorder, current episode manic with psychotic symptoms     Hypertension     Manic bipolar I disorder     Mental health disorder     PTSD (post-traumatic stress  disorder)     Substance abuse      Past Surgical History:   Procedure Laterality Date    APPENDECTOMY      c sections      x 3    CHOLECYSTECTOMY      ESOPHAGOGASTRODUODENOSCOPY N/A 9/15/2022    Procedure: EGD (ESOPHAGOGASTRODUODENOSCOPY);  Surgeon: Law Sheriff MD;  Location: [facility];  Service: Endoscopy;  Laterality: N/A;    HYSTERECTOMY      TONSILLECTOMY      tonsils and adenoidectomy       Family History   Problem Relation Age of Onset    Ovarian cancer Mother     Cancer Other         breast    No Known Problems Father     Diabetes Paternal Grandmother        Diagnoses  Problem Noted   Right Facial Numbness 2/24/2024       Dustin Garcia MD      Emergent/Acute neurological consultation requested by spoke provider due to critical concerns for possible cerebrovascular event that could result in permanent loss of neurologic/bodily function, severe disability or death of this patient.  Immediate/timely evaluation by a highly prepared expert is paramount for optimal outcomes  High risk for neurological deterioration if not properly diagnosed  High risk for neurological deterioration if not treated promplty/as soon as possible  Complex diagnostic evaluation may be required (advanced imaging)  High risk treatment options (thrombolytics and/or thrombectomy)    Patient care was coordinated with spoke provider, including but not limted to    Discussing likely diagnosis/etiology of symptoms  Making recommendations for further diagnostic studies  Making recommendations for intravenous thrombolytics or other advanced therapies  Making recommendations for disposition (admission/transfer for higher level of care)

## 2024-02-25 LAB
OHS QRS DURATION: 84 MS
OHS QTC CALCULATION: 452 MS

## 2024-06-19 ENCOUNTER — HOSPITAL ENCOUNTER (EMERGENCY)
Facility: HOSPITAL | Age: 39
Discharge: HOME | End: 2024-06-19
Attending: EMERGENCY MEDICINE
Payer: COMMERCIAL

## 2024-06-19 ENCOUNTER — HOSPITAL ENCOUNTER (EMERGENCY)
Facility: HOSPITAL | Age: 39
Discharge: LEFT BEFORE DISCHARGE | End: 2024-06-19
Payer: COMMERCIAL

## 2024-06-19 VITALS
SYSTOLIC BLOOD PRESSURE: 135 MMHG | HEIGHT: 63 IN | OXYGEN SATURATION: 98 % | DIASTOLIC BLOOD PRESSURE: 88 MMHG | RESPIRATION RATE: 20 BRPM | BODY MASS INDEX: 42.52 KG/M2 | WEIGHT: 240 LBS | HEART RATE: 92 BPM | TEMPERATURE: 98 F

## 2024-06-19 VITALS
SYSTOLIC BLOOD PRESSURE: 168 MMHG | HEIGHT: 63 IN | OXYGEN SATURATION: 98 % | WEIGHT: 280 LBS | RESPIRATION RATE: 18 BRPM | HEART RATE: 81 BPM | BODY MASS INDEX: 49.61 KG/M2 | DIASTOLIC BLOOD PRESSURE: 95 MMHG | TEMPERATURE: 99 F

## 2024-06-19 DIAGNOSIS — R06.2 WHEEZING: ICD-10-CM
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
## 2024-06-19 DIAGNOSIS — S90.862A INSECT BITE OF LEFT FOOT, INITIAL ENCOUNTER: Primary | ICD-10-CM

## 2024-06-19 DIAGNOSIS — L02.91 ABSCESS: ICD-10-CM

## 2024-06-19 PROCEDURE — 99281 EMR DPT VST MAYX REQ PHY/QHP: CPT | Mod: 25

## 2024-06-19 PROCEDURE — 99900031 HC PATIENT EDUCATION (STAT)

## 2024-06-19 PROCEDURE — 25000242 PHARM REV CODE 250 ALT 637 W/ HCPCS: Performed by: EMERGENCY MEDICINE

## 2024-06-19 PROCEDURE — 90715 TDAP VACCINE 7 YRS/> IM: CPT | Performed by: EMERGENCY MEDICINE

## 2024-06-19 PROCEDURE — 63600175 PHARM REV CODE 636 W HCPCS: Performed by: EMERGENCY MEDICINE

## 2024-06-19 PROCEDURE — 99900035 HC TECH TIME PER 15 MIN (STAT)

## 2024-06-19 PROCEDURE — 90471 IMMUNIZATION ADMIN: CPT | Performed by: EMERGENCY MEDICINE

## 2024-06-19 PROCEDURE — 94640 AIRWAY INHALATION TREATMENT: CPT

## 2024-06-19 PROCEDURE — 94761 N-INVAS EAR/PLS OXIMETRY MLT: CPT

## 2024-06-19 PROCEDURE — 94799 UNLISTED PULMONARY SVC/PX: CPT

## 2024-06-19 PROCEDURE — 25000003 PHARM REV CODE 250: Performed by: PHYSICIAN ASSISTANT

## 2024-06-19 PROCEDURE — 25000003 PHARM REV CODE 250: Performed by: EMERGENCY MEDICINE

## 2024-06-19 PROCEDURE — 99284 EMERGENCY DEPT VISIT MOD MDM: CPT | Mod: 25

## 2024-06-19 PROCEDURE — 96372 THER/PROPH/DIAG INJ SC/IM: CPT | Performed by: EMERGENCY MEDICINE

## 2024-06-19 RX ORDER — MUPIROCIN 20 MG/G
OINTMENT TOPICAL 2 TIMES DAILY
Qty: 15 G | Refills: 0 | Status: SHIPPED | OUTPATIENT
Start: 2024-06-19

## 2024-06-19 RX ORDER — DEXAMETHASONE SODIUM PHOSPHATE 4 MG/ML
8 INJECTION, SOLUTION INTRA-ARTICULAR; INTRALESIONAL; INTRAMUSCULAR; INTRAVENOUS; SOFT TISSUE
Status: COMPLETED | OUTPATIENT
Start: 2024-06-19 | End: 2024-06-19

## 2024-06-19 RX ORDER — IBUPROFEN 400 MG/1
400 TABLET ORAL
Status: COMPLETED | OUTPATIENT
Start: 2024-06-19 | End: 2024-06-19

## 2024-06-19 RX ORDER — FAMOTIDINE 20 MG/1
20 TABLET, FILM COATED ORAL
Status: DISCONTINUED | OUTPATIENT
Start: 2024-06-19 | End: 2024-06-20 | Stop reason: HOSPADM

## 2024-06-19 RX ORDER — HYDROCODONE BITARTRATE AND ACETAMINOPHEN 5; 325 MG/1; MG/1
1 TABLET ORAL
Status: COMPLETED | OUTPATIENT
Start: 2024-06-19 | End: 2024-06-19

## 2024-06-19 RX ORDER — SULFAMETHOXAZOLE AND TRIMETHOPRIM 800; 160 MG/1; MG/1
1 TABLET ORAL 2 TIMES DAILY
Qty: 14 TABLET | Refills: 0 | Status: SHIPPED | OUTPATIENT
Start: 2024-06-19 | End: 2024-06-26

## 2024-06-19 RX ORDER — IPRATROPIUM BROMIDE AND ALBUTEROL SULFATE 2.5; .5 MG/3ML; MG/3ML
3 SOLUTION RESPIRATORY (INHALATION)
Status: COMPLETED | OUTPATIENT
Start: 2024-06-19 | End: 2024-06-19

## 2024-06-19 RX ORDER — FAMOTIDINE 20 MG/1
20 TABLET, FILM COATED ORAL 2 TIMES DAILY
Status: DISCONTINUED | OUTPATIENT
Start: 2024-06-19 | End: 2024-06-19 | Stop reason: HOSPADM

## 2024-06-19 RX ORDER — DIPHENHYDRAMINE HCL 25 MG
25 CAPSULE ORAL
Status: DISCONTINUED | OUTPATIENT
Start: 2024-06-19 | End: 2024-06-19 | Stop reason: HOSPADM

## 2024-06-19 RX ORDER — DIPHENHYDRAMINE HCL 25 MG
25 CAPSULE ORAL
Status: COMPLETED | OUTPATIENT
Start: 2024-06-19 | End: 2024-06-19

## 2024-06-19 RX ORDER — MUPIROCIN 20 MG/G
OINTMENT TOPICAL
Status: COMPLETED | OUTPATIENT
Start: 2024-06-19 | End: 2024-06-19

## 2024-06-19 RX ADMIN — CLOSTRIDIUM TETANI TOXOID ANTIGEN (FORMALDEHYDE INACTIVATED), CORYNEBACTERIUM DIPHTHERIAE TOXOID ANTIGEN (FORMALDEHYDE INACTIVATED), BORDETELLA PERTUSSIS TOXOID ANTIGEN (GLUTARALDEHYDE INACTIVATED), BORDETELLA PERTUSSIS FILAMENTOUS HEMAGGLUTININ ANTIGEN (FORMALDEHYDE INACTIVATED), BORDETELLA PERTUSSIS PERTACTIN ANTIGEN, AND BORDETELLA PERTUSSIS FIMBRIAE 2/3 ANTIGEN 0.5 ML: 5; 2; 2.5; 5; 3; 5 INJECTION, SUSPENSION INTRAMUSCULAR at 09:06

## 2024-06-19 RX ADMIN — DEXAMETHASONE SODIUM PHOSPHATE 8 MG: 4 INJECTION, SOLUTION INTRA-ARTICULAR; INTRALESIONAL; INTRAMUSCULAR; INTRAVENOUS; SOFT TISSUE at 08:06

## 2024-06-19 RX ADMIN — DIPHENHYDRAMINE HYDROCHLORIDE 25 MG: 25 CAPSULE ORAL at 07:06

## 2024-06-19 RX ADMIN — IBUPROFEN 400 MG: 400 TABLET ORAL at 07:06

## 2024-06-19 RX ADMIN — HYDROCODONE BITARTRATE AND ACETAMINOPHEN 1 TABLET: 5; 325 TABLET ORAL at 08:06

## 2024-06-19 RX ADMIN — MUPIROCIN 1 G: 20 OINTMENT TOPICAL at 08:06

## 2024-06-19 RX ADMIN — IPRATROPIUM BROMIDE AND ALBUTEROL SULFATE 3 ML: .5; 3 SOLUTION RESPIRATORY (INHALATION) at 08:06

## 2024-06-19 NOTE — FIRST PROVIDER EVALUATION
Emergency Department TeleTriage Encounter Note      CHIEF COMPLAINT    Chief Complaint   Patient presents with    Wound Check     Abscess to right side    Allergic Reaction     To ant bite to left foot       VITAL SIGNS   Initial Vitals [06/19/24 1802]   BP Pulse Resp Temp SpO2   135/88 92 20 98.4 °F (36.9 °C) 98 %      MAP       --            ALLERGIES    Review of patient's allergies indicates:   Allergen Reactions    Asa [aspirin]     Aspirin Other (See Comments)     avoids due to asthma    Ultram [tramadol]     Ultram [tramadol] Nausea And Vomiting    Zofran [ondansetron hcl (pf)]     Zofran [ondansetron hcl (pf)] Other (See Comments)     Gives migraines       PROVIDER TRIAGE NOTE  Patient presents with abscess to right flank and abscess to lower abdomen. Also reports any bite to foot.       ORDERS  Labs Reviewed - No data to display    ED Orders (720h ago, onward)      None              Virtual Visit Note: The provider triage portion of this emergency department evaluation and documentation was performed via [employer], a HIPAA-compliant telemedicine application, in concert with a tele-presenter in the room. A face to face patient evaluation with one of my colleagues will occur once the patient is placed in an emergency department room.      DISCLAIMER: This note was prepared with [employer] voice recognition transcription software. Garbled syntax, mangled pronouns, and other bizarre constructions may be attributed to that software system.

## 2024-06-20 NOTE — FIRST PROVIDER EVALUATION
Emergency Department TeleTriage Encounter Note      CHIEF COMPLAINT    Chief Complaint   Patient presents with    Insect Bite     BIT BY ANT EARLIER TODAY    Abscess     L SIDE CHEST X 1 DAY    Wheezing       VITAL SIGNS   Initial Vitals [06/19/24 1843]   BP Pulse Resp Temp SpO2   (!) 168/95 95 20 99.4 °F (37.4 °C) 97 %      MAP       --            ALLERGIES    Review of patient's allergies indicates:   Allergen Reactions    Asa [aspirin]     Aspirin Other (See Comments)     avoids due to asthma    Ultram [tramadol]     Ultram [tramadol] Nausea And Vomiting    Zofran [ondansetron hcl (pf)]     Zofran [ondansetron hcl (pf)] Other (See Comments)     Gives migraines       PROVIDER TRIAGE NOTE  Patient was seen in Tele Triage by me at another campus. She left that campus because she was told she had to go back to the waiting room (per patient). She did not receive meds that were ordered. She has multiple abscesses and ant bite to foot from yesterday.       ORDERS  Labs Reviewed - No data to display    ED Orders (720h ago, onward)      None              Virtual Visit Note: The provider triage portion of this emergency department evaluation and documentation was performed via [employer], a HIPAA-compliant telemedicine application, in concert with a tele-presenter in the room. A face to face patient evaluation with one of my colleagues will occur once the patient is placed in an emergency department room.      DISCLAIMER: This note was prepared with [employer] voice recognition transcription software. Garbled syntax, mangled pronouns, and other bizarre constructions may be attributed to that software system.    
no

## 2024-06-20 NOTE — ED PROVIDER NOTES
Encounter Date: 6/19/2024       History     Chief Complaint   Patient presents with    Insect Bite     BIT BY ANT EARLIER TODAY    Abscess     L SIDE CHEST X 1 DAY    Wheezing     HPI  Pt w/ PMHx asthma, Bipolar D/O presents to ED with multiple complaints.  She complains of ant bites to her left dorsal foot sustained today which caused her to have wheezing and itching.  States she is allergic to fire ants.  She also complains of painful abscess to her left flank region and mid abdomen that have been present for 2 days.  Denies any known fever or drainage.  States she has had these recurrently in the past.  She denies recent antibiotic use.      Review of patient's allergies indicates:   Allergen Reactions    Asa [aspirin]     Aspirin Other (See Comments)     avoids due to asthma    Ultram [tramadol]     Ultram [tramadol] Nausea And Vomiting    Zofran [ondansetron hcl (pf)]     Zofran [ondansetron hcl (pf)] Other (See Comments)     Gives migraines     Past Medical History:   Diagnosis Date    Anxiety     Asthma     Bipolar affective disorder, current episode manic with psychotic symptoms     Hypertension     Manic bipolar I disorder     Mental health disorder     PTSD (post-traumatic stress disorder)     Substance abuse      Past Surgical History:   Procedure Laterality Date    APPENDECTOMY      c sections      x 3    CHOLECYSTECTOMY      ESOPHAGOGASTRODUODENOSCOPY N/A 9/15/2022    Procedure: EGD (ESOPHAGOGASTRODUODENOSCOPY);  Surgeon: Law Sheriff MD;  Location: [facility];  Service: Endoscopy;  Laterality: N/A;    HYSTERECTOMY      TONSILLECTOMY      tonsils and adenoidectomy       Family History   Problem Relation Name Age of Onset    Ovarian cancer Mother      Cancer Other m great gm         breast    No Known Problems Father      Diabetes Paternal Grandmother       Social History     Tobacco Use    Smoking status: Every Day     Current packs/day: 0.50     Types: Cigarettes    Smokeless tobacco: Never    Substance Use Topics    Alcohol use: Never    Drug use: Yes     Types: Marijuana     Comment: daily     Review of Systems   Constitutional:  Negative for fever.   HENT:  Negative for sore throat.    Respiratory:  Positive for wheezing. Negative for shortness of breath.    Cardiovascular:  Negative for chest pain.   Gastrointestinal:  Negative for nausea.   Genitourinary:  Negative for dysuria.   Musculoskeletal:  Negative for back pain.   Skin:  Positive for rash.   Allergic/Immunologic: Positive for environmental allergies.   Neurological:  Negative for weakness.   Hematological:  Does not bruise/bleed easily.       Physical Exam     Initial Vitals [06/19/24 1843]   BP Pulse Resp Temp SpO2   (!) 168/95 95 20 99.4 °F (37.4 °C) 97 %      MAP       --         Physical Exam    Nursing note and vitals reviewed.  Constitutional: She appears well-developed. No distress.   Morbidly obese   HENT:   Head: Normocephalic and atraumatic.   Mouth/Throat: Oropharynx is clear and moist.   Eyes: EOM are normal. Pupils are equal, round, and reactive to light.   Neck: Neck supple.   Normal range of motion.  Cardiovascular:  Normal rate and regular rhythm.           Pulmonary/Chest: No respiratory distress. She has wheezes (Bilateral inspiratory/expiratory wheezing).   Abdominal: Abdomen is soft. There is no abdominal tenderness. There is no rebound and no guarding.   Musculoskeletal:         General: No tenderness or edema. Normal range of motion.      Cervical back: Normal range of motion and neck supple.     Neurological: She is alert and oriented to person, place, and time. She has normal strength. No cranial nerve deficit. GCS score is 15. GCS eye subscore is 4. GCS verbal subscore is 5. GCS motor subscore is 6.   Skin: Skin is warm and dry. Capillary refill takes less than 2 seconds.   Patient has small superficial abscess with minimal surrounding erythema to the left flank and left mid abdomen.  No spontaneous drainage noted.   There is minimal induration without fluctuance.  There is a small blister with clear fluid with minimal surrounding erythema to the left dorsal hindfoot.   Psychiatric: She has a normal mood and affect. Thought content normal.         ED Course   Procedures  Labs Reviewed - No data to display       Imaging Results    None          Medications   diphenhydrAMINE capsule 25 mg (25 mg Oral Given 6/19/24 1923)   ibuprofen tablet 400 mg (400 mg Oral Given 6/19/24 1923)   albuterol-ipratropium 2.5 mg-0.5 mg/3 mL nebulizer solution 3 mL (3 mLs Nebulization Given 6/19/24 2019)   dexAMETHasone injection 8 mg (8 mg Intramuscular Given 6/19/24 2038)   HYDROcodone-acetaminophen 5-325 mg per tablet 1 tablet (1 tablet Oral Given 6/19/24 2038)   mupirocin 2 % ointment (1 g Topical (Top) Given 6/19/24 2038)   Tdap vaccine injection 0.5 mL (0.5 mLs Intramuscular Given 6/19/24 2143)     Medical Decision Making  Patient presents to ED as above.  Vitals stable.  Differential includes but not limited to allergic reaction, localized reaction to insect bite, abscess, cellulitis, asthma exacerbation.  The lesion to the patient's foot appears most consistent with an insect bite with localized reaction.  She does have some early abscesses to the trunk that are not yet ready for I&D.  Patient given a DuoNeb here with resolution of her wheezing.  She was additionally given Benadryl, ibuprofen, dexamethasone injection and Norco with improvement in symptoms.  Topical Bactroban applied to lesions.  Will discharge with a course of Bactroban, Bactrim.  Patient states she is adequate inhalers at home.  She may continue oral Benadryl or Zyrtec as needed, as directed for itching.  Recommend PCP follow up for recheck.  ED return precautions discussed and provided.    Amount and/or Complexity of Data Reviewed  External Data Reviewed: notes.    Risk  OTC drugs.  Prescription drug management.                                      Clinical  Impression:  Final diagnoses:  [S90.862A, W57.XXXA] Insect bite of left foot, initial encounter (Primary)  [L02.91] Abscess  [R06.2] Wheezing          ED Disposition Condition    Discharge Stable          ED Prescriptions       Medication Sig Dispense Start Date End Date Auth. Provider    mupirocin (BACTROBAN) 2 % ointment Apply topically 2 (two) times daily. 15 g 6/19/2024 -- Denise Estrada MD    sulfamethoxazole-trimethoprim 800-160mg (BACTRIM DS) 800-160 mg Tab Take 1 tablet by mouth 2 (two) times daily. for 7 days 14 tablet 6/19/2024 6/26/2024 Denise Estrada MD          Follow-up Information       Follow up With Specialties Details Why Contact Info Additional Information    Jorge Lomeli, FNP-C Family Medicine Schedule an appointment as soon as possible for a visit   [address] [place] [address]  [phone]       [facility] - Emergency Dept Emergency Medicine  As needed, If symptoms worsen [address] [facility] [id number] [phone] 1st floor             Denise Estrada MD  06/20/24 5426

## 2024-06-20 NOTE — CARE UPDATE
06/19/24 2019   Patient Assessment/Suction   Level of Consciousness (AVPU) alert   Respiratory Effort Unlabored;Short of breath   Expansion/Accessory Muscles/Retractions no use of accessory muscles   All Lung Fields Breath Sounds Anterior:;diminished;wheezes, expiratory   Rhythm/Pattern, Respiratory depth regular;pattern regular   Cough Frequency infrequent   Cough Type nonproductive   PRE-TX-O2   Device (Oxygen Therapy) room air   SpO2 98 %   Pulse Oximetry Type Intermittent   $ Pulse Oximetry - Multiple Charge Pulse Oximetry - Multiple   Pulse 81   Resp 20   Aerosol Therapy   $ Aerosol Therapy Charges Aerosol Treatment   Daily Review of Necessity (SVN) completed   Respiratory Treatment Status (SVN) given   Treatment Route (SVN) mask   Patient Position HOB elevated   Post Treatment Assessment (SVN) increased aeration   Signs of Intolerance (SVN) none   Education   $ Education Bronchodilator;15 min   Tobacco Cessation Intervention   Do you use any type of tobacco product? Yes   Are you interested in quitting use of tobacco products? Not interested   Are you interested in Nicotine Replacement for symptom relief? No   Respiratory Evaluation   $ Care Plan Tech Time 15 min   $ Respiratory Evaluation Complete   Evaluation For New Orders   Admitting Diagnosis shortness of breath   Home Oxygen   Has Home Oxygen? No   Home Aerosol, MDI, DPI, and Other Treatments/Therapies   Home Respiratory Therapy Per Patient/Review of Chart Yes   Aerosol Home Meds/Freq albuterol (PROVENTIL) 2.5 mg /3 mL (0.083 %) nebulizer solution; ipratropium (ATROVENT) 0.02 % nebulizer solution   MDI Home Meds/Freq albuterol (PROVENTIL/VENTOLIN HFA) 90 mcg/actuation inhaler; COMBIVENT RESPIMAT  mcg/actuation inhaler;   Oxygen Care Plan   Oxygen Care Plan Per Protocol   SPO2 Goal (%) 92% non-cardiac   Rationale SpO2 is <92% (Non-cardiac Pt.)   Bronchodilator Care Plan   Bronchodilator Care Plan Aerosol;MDI   Aerosol Meds w/ frequency    (albuterol (PROVENTIL) 2.5 mg /3 mL (0.083 %) nebulizer solution; ipratropium (ATROVENT) 0.02 % nebulizer solution)   MDI Meds w/ frequency   (albuterol (PROVENTIL/VENTOLIN HFA) 90 mcg/actuation inhaler; COMBIVENT RESPIMAT  mcg/actuation inhaler;)   Rationale Maintain home respiratory medicine   Atelectasis Care Plan   Rationale No Rational Found   Airway Clearance Care Plan   Rationale No rationale found

## 2024-06-22 ENCOUNTER — HOSPITAL ENCOUNTER (EMERGENCY)
Facility: HOSPITAL | Age: 39
Discharge: HOME | End: 2024-06-23
Attending: EMERGENCY MEDICINE
Payer: COMMERCIAL

## 2024-06-22 DIAGNOSIS — K52.9 GASTROENTERITIS: Primary | ICD-10-CM

## 2024-06-22 LAB
ALBUMIN SERPL BCP-MCNC: 3.4 G/DL (ref 3.5–5.2)
ALP SERPL-CCNC: 97 U/L (ref 55–135)
ALT SERPL W/O P-5'-P-CCNC: 23 U/L (ref 10–44)
ANION GAP SERPL CALC-SCNC: 10 MMOL/L (ref 8–16)
AST SERPL-CCNC: 20 U/L (ref 10–40)
B-HCG UR QL: NEGATIVE
BASOPHILS # BLD AUTO: 0.02 K/UL (ref 0–0.2)
BASOPHILS NFR BLD: 0.2 % (ref 0–1.9)
BILIRUB SERPL-MCNC: 0.3 MG/DL (ref 0.1–1)
BILIRUB UR QL STRIP: NEGATIVE
BUN SERPL-MCNC: 10 MG/DL (ref 6–20)
CALCIUM SERPL-MCNC: 8.5 MG/DL (ref 8.7–10.5)
CHLORIDE SERPL-SCNC: 106 MMOL/L (ref 95–110)
CLARITY UR: ABNORMAL
CO2 SERPL-SCNC: 24 MMOL/L (ref 23–29)
COLOR UR: YELLOW
CREAT SERPL-MCNC: 0.9 MG/DL (ref 0.5–1.4)
CTP QC/QA: YES
DIFFERENTIAL METHOD BLD: NORMAL
EOSINOPHIL # BLD AUTO: 0.2 K/UL (ref 0–0.5)
EOSINOPHIL NFR BLD: 1.8 % (ref 0–8)
ERYTHROCYTE [DISTWIDTH] IN BLOOD BY AUTOMATED COUNT: 13.6 % (ref 11.5–14.5)
EST. GFR  (NO RACE VARIABLE): >60 ML/MIN/1.73 M^2
GLUCOSE SERPL-MCNC: 101 MG/DL (ref 70–110)
GLUCOSE UR QL STRIP: NEGATIVE
HCT VFR BLD AUTO: 42.7 % (ref 37–48.5)
HGB BLD-MCNC: 14.2 G/DL (ref 12–16)
HGB UR QL STRIP: NEGATIVE
IMM GRANULOCYTES # BLD AUTO: 0.01 K/UL (ref 0–0.04)
IMM GRANULOCYTES NFR BLD AUTO: 0.1 % (ref 0–0.5)
KETONES UR QL STRIP: NEGATIVE
LEUKOCYTE ESTERASE UR QL STRIP: NEGATIVE
LIPASE SERPL-CCNC: 15 U/L (ref 4–60)
LYMPHOCYTES # BLD AUTO: 3.1 K/UL (ref 1–4.8)
LYMPHOCYTES NFR BLD: 35.3 % (ref 18–48)
MCH RBC QN AUTO: 29.8 PG (ref 27–31)
MCHC RBC AUTO-ENTMCNC: 33.3 G/DL (ref 32–36)
MCV RBC AUTO: 90 FL (ref 82–98)
MONOCYTES # BLD AUTO: 0.8 K/UL (ref 0.3–1)
MONOCYTES NFR BLD: 9 % (ref 4–15)
NEUTROPHILS # BLD AUTO: 4.7 K/UL (ref 1.8–7.7)
NEUTROPHILS NFR BLD: 53.6 % (ref 38–73)
NITRITE UR QL STRIP: NEGATIVE
NRBC BLD-RTO: 0 /100 WBC
PH UR STRIP: 6 [PH] (ref 5–8)
PLATELET # BLD AUTO: 319 K/UL (ref 150–450)
PMV BLD AUTO: 9.2 FL (ref 9.2–12.9)
POTASSIUM SERPL-SCNC: 3.4 MMOL/L (ref 3.5–5.1)
PROT SERPL-MCNC: 6.6 G/DL (ref 6–8.4)
PROT UR QL STRIP: ABNORMAL
RBC # BLD AUTO: 4.77 M/UL (ref 4–5.4)
SODIUM SERPL-SCNC: 140 MMOL/L (ref 136–145)
SP GR UR STRIP: 1.02 (ref 1–1.03)
URN SPEC COLLECT METH UR: ABNORMAL
UROBILINOGEN UR STRIP-ACNC: NEGATIVE EU/DL
WBC # BLD AUTO: 8.78 K/UL (ref 3.9–12.7)

## 2024-06-22 PROCEDURE — 96375 TX/PRO/DX INJ NEW DRUG ADDON: CPT

## 2024-06-22 PROCEDURE — 63600175 PHARM REV CODE 636 W HCPCS: Performed by: EMERGENCY MEDICINE

## 2024-06-22 PROCEDURE — 81003 URINALYSIS AUTO W/O SCOPE: CPT | Performed by: EMERGENCY MEDICINE

## 2024-06-22 PROCEDURE — 25000003 PHARM REV CODE 250: Performed by: EMERGENCY MEDICINE

## 2024-06-22 PROCEDURE — 85025 COMPLETE CBC W/AUTO DIFF WBC: CPT | Performed by: EMERGENCY MEDICINE

## 2024-06-22 PROCEDURE — 96361 HYDRATE IV INFUSION ADD-ON: CPT

## 2024-06-22 PROCEDURE — 83690 ASSAY OF LIPASE: CPT | Performed by: EMERGENCY MEDICINE

## 2024-06-22 PROCEDURE — 96374 THER/PROPH/DIAG INJ IV PUSH: CPT

## 2024-06-22 PROCEDURE — 99285 EMERGENCY DEPT VISIT HI MDM: CPT | Mod: 25

## 2024-06-22 PROCEDURE — 36415 COLL VENOUS BLD VENIPUNCTURE: CPT | Performed by: EMERGENCY MEDICINE

## 2024-06-22 PROCEDURE — 80053 COMPREHEN METABOLIC PANEL: CPT | Performed by: EMERGENCY MEDICINE

## 2024-06-22 PROCEDURE — 81025 URINE PREGNANCY TEST: CPT | Performed by: EMERGENCY MEDICINE

## 2024-06-22 RX ORDER — MORPHINE SULFATE 4 MG/ML
4 INJECTION, SOLUTION INTRAMUSCULAR; INTRAVENOUS
Status: COMPLETED | OUTPATIENT
Start: 2024-06-22 | End: 2024-06-22

## 2024-06-22 RX ORDER — PROCHLORPERAZINE EDISYLATE 5 MG/ML
10 INJECTION INTRAMUSCULAR; INTRAVENOUS
Status: COMPLETED | OUTPATIENT
Start: 2024-06-22 | End: 2024-06-22

## 2024-06-22 RX ADMIN — PROCHLORPERAZINE EDISYLATE 10 MG: 5 INJECTION INTRAMUSCULAR; INTRAVENOUS at 11:06

## 2024-06-22 RX ADMIN — MORPHINE SULFATE 4 MG: 4 INJECTION INTRAVENOUS at 11:06

## 2024-06-22 RX ADMIN — SODIUM CHLORIDE 1000 ML: 9 INJECTION, SOLUTION INTRAVENOUS at 11:06

## 2024-06-23 VITALS
RESPIRATION RATE: 20 BRPM | BODY MASS INDEX: 46.07 KG/M2 | DIASTOLIC BLOOD PRESSURE: 57 MMHG | HEART RATE: 86 BPM | WEIGHT: 260 LBS | TEMPERATURE: 99 F | SYSTOLIC BLOOD PRESSURE: 107 MMHG | HEIGHT: 63 IN | OXYGEN SATURATION: 96 %

## 2024-06-23 PROCEDURE — 25500020 PHARM REV CODE 255

## 2024-06-23 PROCEDURE — 96361 HYDRATE IV INFUSION ADD-ON: CPT

## 2024-06-23 RX ORDER — PROMETHAZINE HYDROCHLORIDE 25 MG/1
25 SUPPOSITORY RECTAL EVERY 6 HOURS PRN
Qty: 10 SUPPOSITORY | Refills: 0 | Status: SHIPPED | OUTPATIENT
Start: 2024-06-23

## 2024-06-23 RX ADMIN — IOHEXOL 100 ML: 350 INJECTION, SOLUTION INTRAVENOUS at 12:06

## 2024-06-23 NOTE — ED PROVIDER NOTES
Encounter Date: 6/22/2024       History     Chief Complaint   Patient presents with    Abdominal Pain     Pt exposed to c-diff    Diarrhea     Patient presents emergency department with reported abdominal pain with associated diarrhea nausea vomiting onset over last 2 days patient states pain in her upper abdomen she feels bloated she denies any fever chills she is concerned she states that her roommate has C diff she has had cholecystectomy and appendectomy in the past she denies any urinary symptoms no noted blood in his stool        Review of patient's allergies indicates:   Allergen Reactions    Asa [aspirin]     Aspirin Other (See Comments)     avoids due to asthma    Ultram [tramadol]     Ultram [tramadol] Nausea And Vomiting    Zofran [ondansetron hcl (pf)]     Zofran [ondansetron hcl (pf)] Other (See Comments)     Gives migraines     Past Medical History:   Diagnosis Date    Anxiety     Asthma     Bipolar affective disorder, current episode manic with psychotic symptoms     Hypertension     Manic bipolar I disorder     Mental health disorder     PTSD (post-traumatic stress disorder)     Substance abuse      Past Surgical History:   Procedure Laterality Date    APPENDECTOMY      c sections      x 3    CHOLECYSTECTOMY      ESOPHAGOGASTRODUODENOSCOPY N/A 9/15/2022    Procedure: EGD (ESOPHAGOGASTRODUODENOSCOPY);  Surgeon: Law Sheriff MD;  Location: [facility];  Service: Endoscopy;  Laterality: N/A;    HYSTERECTOMY      TONSILLECTOMY      tonsils and adenoidectomy       Family History   Problem Relation Name Age of Onset    Ovarian cancer Mother      Cancer Other m great gm         breast    No Known Problems Father      Diabetes Paternal Grandmother       Social History     Tobacco Use    Smoking status: Every Day     Current packs/day: 0.50     Types: Cigarettes    Smokeless tobacco: Never   Substance Use Topics    Alcohol use: Never    Drug use: Yes     Types: Marijuana     Comment: daily     Review of  Systems   Constitutional:  Negative for appetite change, chills and fever.   Gastrointestinal:  Positive for abdominal distention, abdominal pain, diarrhea, nausea and vomiting. Negative for anal bleeding and blood in stool.   Genitourinary: Negative.    All other systems reviewed and are negative.      Physical Exam     Initial Vitals [06/22/24 2240]   BP Pulse Resp Temp SpO2   (!) 147/64 95 18 98.4 °F (36.9 °C) 98 %      MAP       --         Physical Exam    Constitutional: She appears well-developed and well-nourished. No distress.   HENT:   Head: Normocephalic and atraumatic.   Right Ear: External ear normal.   Left Ear: External ear normal.   Mouth/Throat: Oropharynx is clear and moist.   Eyes: Conjunctivae and EOM are normal. Pupils are equal, round, and reactive to light.   Neck: Neck supple.   Normal range of motion.  Cardiovascular:  Normal rate, regular rhythm, normal heart sounds and intact distal pulses.           Pulmonary/Chest: Breath sounds normal. No respiratory distress.   Abdominal: Abdomen is soft and protuberant. Bowel sounds are normal. There is abdominal tenderness in the epigastric area.   Musculoskeletal:         General: No edema. Normal range of motion.      Cervical back: Normal range of motion and neck supple.     Neurological: She is alert and oriented to person, place, and time. GCS score is 15. GCS eye subscore is 4. GCS verbal subscore is 5. GCS motor subscore is 6.   Skin: Skin is warm and dry. Capillary refill takes less than 2 seconds. No rash noted.   Psychiatric: She has a normal mood and affect. Her behavior is normal.         ED Course   Procedures  Labs Reviewed   COMPREHENSIVE METABOLIC PANEL - Abnormal; Notable for the following components:       Result Value    Potassium 3.4 (*)     Calcium 8.5 (*)     Albumin 3.4 (*)     All other components within normal limits   URINALYSIS, REFLEX TO URINE CULTURE - Abnormal; Notable for the following components:    Appearance, UA Hazy  (*)     Protein, UA Trace (*)     All other components within normal limits    Narrative:     Specimen Source->Urine   CULTURE, STOOL   CLOSTRIDIUM DIFFICILE   CBC W/ AUTO DIFFERENTIAL   LIPASE   STOOL EXAM-OVA,CYSTS,PARASITES   OCCULT BLOOD X 1, STOOL   POCT URINE PREGNANCY          Imaging Results              CT Abdomen Pelvis With IV Contrast NO Oral Contrast (In process)                      Medications   sodium chloride 0.9% bolus 1,000 mL 1,000 mL (0 mLs Intravenous Stopped 6/23/24 0118)   morphine injection 4 mg (4 mg Intravenous Given 6/22/24 2344)   prochlorperazine injection Soln 10 mg (10 mg Intravenous Given 6/22/24 2345)   iohexoL (OMNIPAQUE 350) 350 mg iodine/mL injection (100 mLs Intravenous Given 6/23/24 0004)     Medical Decision Making  CT scan shows evidence of diarrheal illness but no evidence of colitis unlikely this is C diff given lack of colitis will treat has gastroenteritis nausea control advised patient she may use Imodium for day clear liquids advance slowly return to ER for any worsened symptoms or new symptoms    Amount and/or Complexity of Data Reviewed  Labs: ordered. Decision-making details documented in ED Course.  Radiology: ordered. Decision-making details documented in ED Course.    Risk  Prescription drug management.                                      Clinical Impression:  Final diagnoses:  [K52.9] Gastroenteritis (Primary)          ED Disposition Condition    Discharge Stable          ED Prescriptions       Medication Sig Dispense Start Date End Date Auth. Provider    promethazine (PHENERGAN) 25 MG suppository Place 1 suppository (25 mg total) rectally every 6 (six) hours as needed for Nausea. 10 suppository 6/23/2024 -- Kemal Solorio MD          Follow-up Information       Follow up With Specialties Details Why Contact Info    Jorge Lomeli, FNP-C Family Medicine In 1 day for re-examination of your symptoms [address] [place] [id number] [phone]                Kemal Solorio MD  06/23/24 8112

## 2024-06-24 ENCOUNTER — HOSPITAL ENCOUNTER (EMERGENCY)
Facility: HOSPITAL | Age: 39
Discharge: HOME | End: 2024-06-24
Attending: EMERGENCY MEDICINE
Payer: COMMERCIAL

## 2024-06-24 VITALS
HEIGHT: 63 IN | SYSTOLIC BLOOD PRESSURE: 137 MMHG | HEART RATE: 88 BPM | RESPIRATION RATE: 20 BRPM | OXYGEN SATURATION: 99 % | DIASTOLIC BLOOD PRESSURE: 84 MMHG | WEIGHT: 260 LBS | BODY MASS INDEX: 46.07 KG/M2 | TEMPERATURE: 98 F

## 2024-06-24 DIAGNOSIS — J45.21 MILD INTERMITTENT ASTHMA WITH EXACERBATION: ICD-10-CM

## 2024-06-24 DIAGNOSIS — R19.7 DIARRHEA, UNSPECIFIED TYPE: Primary | ICD-10-CM

## 2024-06-24 LAB
ALBUMIN SERPL BCP-MCNC: 3.4 G/DL (ref 3.5–5.2)
ALP SERPL-CCNC: 123 U/L (ref 55–135)
ALT SERPL W/O P-5'-P-CCNC: 84 U/L (ref 10–44)
ANION GAP SERPL CALC-SCNC: 8 MMOL/L (ref 8–16)
AST SERPL-CCNC: 46 U/L (ref 10–40)
B-HCG UR QL: NEGATIVE
BASOPHILS # BLD AUTO: 0.03 K/UL (ref 0–0.2)
BASOPHILS NFR BLD: 0.2 % (ref 0–1.9)
BILIRUB SERPL-MCNC: 0.4 MG/DL (ref 0.1–1)
BILIRUB UR QL STRIP: NEGATIVE
BUN SERPL-MCNC: 8 MG/DL (ref 6–20)
CALCIUM SERPL-MCNC: 8.8 MG/DL (ref 8.7–10.5)
CHLORIDE SERPL-SCNC: 107 MMOL/L (ref 95–110)
CLARITY UR: ABNORMAL
CO2 SERPL-SCNC: 22 MMOL/L (ref 23–29)
COLOR UR: YELLOW
CREAT SERPL-MCNC: 0.8 MG/DL (ref 0.5–1.4)
CTP QC/QA: YES
DIFFERENTIAL METHOD BLD: ABNORMAL
EOSINOPHIL # BLD AUTO: 0.2 K/UL (ref 0–0.5)
EOSINOPHIL NFR BLD: 1.7 % (ref 0–8)
ERYTHROCYTE [DISTWIDTH] IN BLOOD BY AUTOMATED COUNT: 13.7 % (ref 11.5–14.5)
EST. GFR  (NO RACE VARIABLE): >60 ML/MIN/1.73 M^2
GLUCOSE SERPL-MCNC: 94 MG/DL (ref 70–110)
GLUCOSE UR QL STRIP: NEGATIVE
HCT VFR BLD AUTO: 41.6 % (ref 37–48.5)
HGB BLD-MCNC: 13.9 G/DL (ref 12–16)
HGB UR QL STRIP: NEGATIVE
IMM GRANULOCYTES # BLD AUTO: 0.04 K/UL (ref 0–0.04)
IMM GRANULOCYTES NFR BLD AUTO: 0.3 % (ref 0–0.5)
KETONES UR QL STRIP: NEGATIVE
LEUKOCYTE ESTERASE UR QL STRIP: NEGATIVE
LIPASE SERPL-CCNC: 13 U/L (ref 4–60)
LYMPHOCYTES # BLD AUTO: 3.1 K/UL (ref 1–4.8)
LYMPHOCYTES NFR BLD: 25.5 % (ref 18–48)
MCH RBC QN AUTO: 29.7 PG (ref 27–31)
MCHC RBC AUTO-ENTMCNC: 33.4 G/DL (ref 32–36)
MCV RBC AUTO: 89 FL (ref 82–98)
MONOCYTES # BLD AUTO: 0.8 K/UL (ref 0.3–1)
MONOCYTES NFR BLD: 6.3 % (ref 4–15)
NEUTROPHILS # BLD AUTO: 8 K/UL (ref 1.8–7.7)
NEUTROPHILS NFR BLD: 66 % (ref 38–73)
NITRITE UR QL STRIP: NEGATIVE
NRBC BLD-RTO: 0 /100 WBC
PH UR STRIP: 6 [PH] (ref 5–8)
PLATELET # BLD AUTO: 344 K/UL (ref 150–450)
PMV BLD AUTO: 9.2 FL (ref 9.2–12.9)
POTASSIUM SERPL-SCNC: 3.5 MMOL/L (ref 3.5–5.1)
PROT SERPL-MCNC: 6.8 G/DL (ref 6–8.4)
PROT UR QL STRIP: ABNORMAL
RBC # BLD AUTO: 4.68 M/UL (ref 4–5.4)
SODIUM SERPL-SCNC: 137 MMOL/L (ref 136–145)
SP GR UR STRIP: 1.03 (ref 1–1.03)
URN SPEC COLLECT METH UR: ABNORMAL
UROBILINOGEN UR STRIP-ACNC: ABNORMAL EU/DL
WBC # BLD AUTO: 12.18 K/UL (ref 3.9–12.7)

## 2024-06-24 PROCEDURE — 81003 URINALYSIS AUTO W/O SCOPE: CPT | Performed by: EMERGENCY MEDICINE

## 2024-06-24 PROCEDURE — 80053 COMPREHEN METABOLIC PANEL: CPT | Performed by: EMERGENCY MEDICINE

## 2024-06-24 PROCEDURE — 94640 AIRWAY INHALATION TREATMENT: CPT

## 2024-06-24 PROCEDURE — 25000242 PHARM REV CODE 250 ALT 637 W/ HCPCS: Performed by: EMERGENCY MEDICINE

## 2024-06-24 PROCEDURE — 81025 URINE PREGNANCY TEST: CPT | Performed by: EMERGENCY MEDICINE

## 2024-06-24 PROCEDURE — 96374 THER/PROPH/DIAG INJ IV PUSH: CPT

## 2024-06-24 PROCEDURE — 83690 ASSAY OF LIPASE: CPT | Performed by: EMERGENCY MEDICINE

## 2024-06-24 PROCEDURE — 63600175 PHARM REV CODE 636 W HCPCS: Performed by: EMERGENCY MEDICINE

## 2024-06-24 PROCEDURE — 36415 COLL VENOUS BLD VENIPUNCTURE: CPT | Performed by: EMERGENCY MEDICINE

## 2024-06-24 PROCEDURE — 94760 N-INVAS EAR/PLS OXIMETRY 1: CPT

## 2024-06-24 PROCEDURE — 85025 COMPLETE CBC W/AUTO DIFF WBC: CPT | Performed by: EMERGENCY MEDICINE

## 2024-06-24 PROCEDURE — 99284 EMERGENCY DEPT VISIT MOD MDM: CPT | Mod: 25

## 2024-06-24 RX ORDER — LEVALBUTEROL 1.25 MG/.5ML
3.75 SOLUTION, CONCENTRATE RESPIRATORY (INHALATION)
Status: COMPLETED | OUTPATIENT
Start: 2024-06-24 | End: 2024-06-24

## 2024-06-24 RX ORDER — METHYLPREDNISOLONE SOD SUCC 125 MG
125 VIAL (EA) INJECTION
Status: COMPLETED | OUTPATIENT
Start: 2024-06-24 | End: 2024-06-24

## 2024-06-24 RX ORDER — IPRATROPIUM BROMIDE 0.5 MG/2.5ML
1 SOLUTION RESPIRATORY (INHALATION)
Status: COMPLETED | OUTPATIENT
Start: 2024-06-24 | End: 2024-06-24

## 2024-06-24 RX ADMIN — LEVALBUTEROL 3.75 MG: 1.25 SOLUTION, CONCENTRATE RESPIRATORY (INHALATION) at 09:06

## 2024-06-24 RX ADMIN — METHYLPREDNISOLONE SODIUM SUCCINATE 125 MG: 125 INJECTION, POWDER, FOR SOLUTION INTRAMUSCULAR; INTRAVENOUS at 09:06

## 2024-06-24 RX ADMIN — IPRATROPIUM BROMIDE 1 MG: 0.5 SOLUTION RESPIRATORY (INHALATION) at 09:06

## 2024-06-25 NOTE — ED PROVIDER NOTES
Encounter Date: 6/24/2024       History     Chief Complaint   Patient presents with    Abdominal Pain     Seen this weekend for same. States sees sure she has c diff after recent exposure.      Patient presents emergency department with reported abdominal pain continues was seen in the emergency department diagnosed with gastroenteritis she is convinced that she has C diff secondary to exposure to roommate with C diff her CT scan 2 days ago showed no evidence of colitis casting possibility of C diff colitis in the patient also reports that she is now coughing and wheezing she has a history of asthma and smokes she denies any fever she is does report chills and a blood in her stool no dysuria urgency or frequency        Review of patient's allergies indicates:   Allergen Reactions    Asa [aspirin]     Aspirin Other (See Comments)     avoids due to asthma    Ultram [tramadol]     Ultram [tramadol] Nausea And Vomiting    Zofran [ondansetron hcl (pf)]     Zofran [ondansetron hcl (pf)] Other (See Comments)     Gives migraines     Past Medical History:   Diagnosis Date    Anxiety     Asthma     Bipolar affective disorder, current episode manic with psychotic symptoms     Hypertension     Manic bipolar I disorder     Mental health disorder     PTSD (post-traumatic stress disorder)     Substance abuse      Past Surgical History:   Procedure Laterality Date    APPENDECTOMY      c sections      x 3    CHOLECYSTECTOMY      ESOPHAGOGASTRODUODENOSCOPY N/A 9/15/2022    Procedure: EGD (ESOPHAGOGASTRODUODENOSCOPY);  Surgeon: Law Sheriff MD;  Location: [facility];  Service: Endoscopy;  Laterality: N/A;    HYSTERECTOMY      TONSILLECTOMY      tonsils and adenoidectomy       Family History   Problem Relation Name Age of Onset    Ovarian cancer Mother      Cancer Other m great gm         breast    No Known Problems Father      Diabetes Paternal Grandmother       Social History     Tobacco Use    Smoking status: Every Day      Current packs/day: 0.50     Types: Cigarettes    Smokeless tobacco: Never   Substance Use Topics    Alcohol use: Never    Drug use: Yes     Types: Marijuana     Comment: daily     Review of Systems   Constitutional:  Positive for chills. Negative for fever.   HENT:  Negative for congestion.    Respiratory:  Positive for cough and wheezing.    Gastrointestinal:  Positive for abdominal pain, diarrhea and nausea. Negative for vomiting.   Genitourinary:  Negative for dysuria and hematuria.   All other systems reviewed and are negative.      Physical Exam     Initial Vitals [06/24/24 1916]   BP Pulse Resp Temp SpO2   136/80 89 18 98.3 °F (36.8 °C) 100 %      MAP       --         Physical Exam    Constitutional: She appears well-developed and well-nourished. No distress.   HENT:   Head: Normocephalic and atraumatic.   Right Ear: External ear normal.   Left Ear: External ear normal.   Mouth/Throat: Oropharynx is clear and moist.   Eyes: Conjunctivae and EOM are normal. Pupils are equal, round, and reactive to light.   Neck: Neck supple.   Normal range of motion.  Cardiovascular:  Normal rate, regular rhythm, normal heart sounds and intact distal pulses.           Pulmonary/Chest: No respiratory distress. She has wheezes. She has rhonchi.   Abdominal: Abdomen is soft. Bowel sounds are normal. There is abdominal tenderness in the epigastric area.   Musculoskeletal:         General: No edema. Normal range of motion.      Cervical back: Normal range of motion and neck supple.     Neurological: She is alert and oriented to person, place, and time. GCS score is 15. GCS eye subscore is 4. GCS verbal subscore is 5. GCS motor subscore is 6.   Skin: Skin is warm and dry. Capillary refill takes less than 2 seconds. No rash noted.   Psychiatric: She has a normal mood and affect. Her behavior is normal.         ED Course   Procedures  Labs Reviewed   CBC W/ AUTO DIFFERENTIAL - Abnormal; Notable for the following components:        Result Value    Gran # (ANC) 8.0 (*)     All other components within normal limits   COMPREHENSIVE METABOLIC PANEL - Abnormal; Notable for the following components:    CO2 22 (*)     Albumin 3.4 (*)     AST 46 (*)     ALT 84 (*)     All other components within normal limits   URINALYSIS, REFLEX TO URINE CULTURE - Abnormal; Notable for the following components:    Appearance, UA Hazy (*)     Protein, UA Trace (*)     Urobilinogen, UA 2.0-3.0 (*)     All other components within normal limits    Narrative:     Specimen Source->Urine   LIPASE   POCT URINE PREGNANCY          Imaging Results              X-Ray Chest AP Portable (In process)                      Medications   levalbuterol nebulizer solution 3.75 mg (3.75 mg Nebulization Given 6/24/24 2104)   ipratropium 0.02 % nebulizer solution 1 mg (1 mg Nebulization Given 6/24/24 2105)   methylPREDNISolone sodium succinate injection 125 mg (125 mg Intravenous Given 6/24/24 2117)     Medical Decision Making  Patient received nebulized therapy in the emergency department with improvement is wheezing she is unable to give a stool specimen advised patient need for stool specimen to allay her fears C diff though as described before given the lack of colitis I suspect this is not C diff will continue prednisone as outpatient    Amount and/or Complexity of Data Reviewed  Labs: ordered.  Radiology: ordered.    Risk  Prescription drug management.                                      Clinical Impression:  Final diagnoses:  [R19.7] Diarrhea, unspecified type (Primary)  [J45.21] Mild intermittent asthma with exacerbation          ED Disposition Condition    Discharge Stable          ED Prescriptions    None       Follow-up Information       Follow up With Specialties Details Why Contact Info    Jorge Lomeli, FNP-C Family Medicine In 1 day for re-examination of your symptoms [address] [place] [address]  [phone]               Kemal Solorio MD  06/24/24  2683

## 2024-10-06 ENCOUNTER — HOSPITAL ENCOUNTER (EMERGENCY)
Facility: HOSPITAL | Age: 39
Discharge: HOME | End: 2024-10-06
Attending: STUDENT IN AN ORGANIZED HEALTH CARE EDUCATION/TRAINING PROGRAM
Payer: COMMERCIAL

## 2024-10-06 VITALS
OXYGEN SATURATION: 100 % | HEART RATE: 99 BPM | HEIGHT: 63 IN | RESPIRATION RATE: 18 BRPM | TEMPERATURE: 98 F | DIASTOLIC BLOOD PRESSURE: 83 MMHG | SYSTOLIC BLOOD PRESSURE: 134 MMHG | WEIGHT: 280 LBS | BODY MASS INDEX: 49.61 KG/M2

## 2024-10-06 DIAGNOSIS — L03.011 PARONYCHIA OF FINGER, RIGHT: Primary | ICD-10-CM

## 2024-10-06 LAB
HCV AB SERPL QL IA: NEGATIVE
HIV 1+2 AB+HIV1 P24 AG SERPL QL IA: NEGATIVE

## 2024-10-06 PROCEDURE — 96372 THER/PROPH/DIAG INJ SC/IM: CPT | Performed by: STUDENT IN AN ORGANIZED HEALTH CARE EDUCATION/TRAINING PROGRAM

## 2024-10-06 PROCEDURE — 87389 HIV-1 AG W/HIV-1&-2 AB AG IA: CPT | Performed by: EMERGENCY MEDICINE

## 2024-10-06 PROCEDURE — 86803 HEPATITIS C AB TEST: CPT | Performed by: EMERGENCY MEDICINE

## 2024-10-06 PROCEDURE — 36415 COLL VENOUS BLD VENIPUNCTURE: CPT | Performed by: EMERGENCY MEDICINE

## 2024-10-06 PROCEDURE — 63600175 PHARM REV CODE 636 W HCPCS: Mod: JZ,JG | Performed by: STUDENT IN AN ORGANIZED HEALTH CARE EDUCATION/TRAINING PROGRAM

## 2024-10-06 PROCEDURE — 99284 EMERGENCY DEPT VISIT MOD MDM: CPT | Mod: 25

## 2024-10-06 PROCEDURE — 25000003 PHARM REV CODE 250: Performed by: STUDENT IN AN ORGANIZED HEALTH CARE EDUCATION/TRAINING PROGRAM

## 2024-10-06 RX ORDER — SULFAMETHOXAZOLE AND TRIMETHOPRIM 800; 160 MG/1; MG/1
1 TABLET ORAL 2 TIMES DAILY
Qty: 14 TABLET | Refills: 0 | Status: SHIPPED | OUTPATIENT
Start: 2024-10-06 | End: 2024-10-13

## 2024-10-06 RX ORDER — CLINDAMYCIN PHOSPHATE 150 MG/ML
600 INJECTION, SOLUTION INTRAVENOUS
Status: COMPLETED | OUTPATIENT
Start: 2024-10-06 | End: 2024-10-06

## 2024-10-06 RX ORDER — BACITRACIN ZINC 500 UNIT/G
OINTMENT (GRAM) TOPICAL 2 TIMES DAILY
Qty: 30 G | Refills: 0 | Status: SHIPPED | OUTPATIENT
Start: 2024-10-06 | End: 2024-10-13

## 2024-10-06 RX ORDER — PROMETHAZINE HYDROCHLORIDE 25 MG/1
25 TABLET ORAL
Status: COMPLETED | OUTPATIENT
Start: 2024-10-06 | End: 2024-10-06

## 2024-10-06 RX ORDER — HYDROCODONE BITARTRATE AND ACETAMINOPHEN 10; 325 MG/1; MG/1
1 TABLET ORAL
Status: COMPLETED | OUTPATIENT
Start: 2024-10-06 | End: 2024-10-06

## 2024-10-06 RX ADMIN — CLINDAMYCIN PHOSPHATE 600 MG: 150 INJECTION, SOLUTION INTRAMUSCULAR; INTRAVENOUS at 08:10

## 2024-10-06 RX ADMIN — HYDROCODONE BITARTRATE AND ACETAMINOPHEN 1 TABLET: 10; 325 TABLET ORAL at 08:10

## 2024-10-06 RX ADMIN — PROMETHAZINE HYDROCHLORIDE 25 MG: 25 TABLET ORAL at 08:10

## 2024-10-06 RX ADMIN — NEOMYCIN-BACITRACN ZN-POLYMYX 3.5 MG-400 UNIT-5,000 UNIT/GRAM TOP OINT: OINTMENT at 08:10

## 2024-10-07 NOTE — DISCHARGE INSTRUCTIONS
Follow up with primary care physician within 2 days for re-evaluation and return to ED for any worsening symptoms.

## 2024-10-07 NOTE — ED PROVIDER NOTES
Encounter Date: 10/6/2024       History     Chief Complaint   Patient presents with    Finger Pain     Redness to 4th finger on right hand around nailbed      39-year-old female presents with right 4th finger infection around nailbed, ongoing for the last 2 days, patient opened up area of interest and had some green purulent drainage.  Associated throbbing pain, no trauma fever or chills.    The history is provided by the patient and a significant other.     Review of patient's allergies indicates:   Allergen Reactions    Asa [aspirin]     Aspirin Other (See Comments)     avoids due to asthma    Ultram [tramadol]     Ultram [tramadol] Nausea And Vomiting    Zofran [ondansetron hcl (pf)]     Zofran [ondansetron hcl (pf)] Other (See Comments)     Gives migraines     Past Medical History:   Diagnosis Date    Anxiety     Asthma     Bipolar affective disorder, current episode manic with psychotic symptoms     Hypertension     Manic bipolar I disorder     Mental health disorder     PTSD (post-traumatic stress disorder)     Substance abuse      Past Surgical History:   Procedure Laterality Date    APPENDECTOMY      c sections      x 3    CHOLECYSTECTOMY      ESOPHAGOGASTRODUODENOSCOPY N/A 9/15/2022    Procedure: EGD (ESOPHAGOGASTRODUODENOSCOPY);  Surgeon: Law Sheriff MD;  Location: [facility];  Service: Endoscopy;  Laterality: N/A;    HYSTERECTOMY      TONSILLECTOMY      tonsils and adenoidectomy       Family History   Problem Relation Name Age of Onset    Ovarian cancer Mother      Cancer Other m great gm         breast    No Known Problems Father      Diabetes Paternal Grandmother       Social History     Tobacco Use    Smoking status: Every Day     Current packs/day: 0.50     Types: Cigarettes    Smokeless tobacco: Never   Substance Use Topics    Alcohol use: Never    Drug use: Yes     Types: Marijuana     Comment: daily     Review of Systems   All other systems reviewed and are negative.      Physical Exam      Initial Vitals [10/06/24 1943]   BP Pulse Resp Temp SpO2   134/83 99 18 98.2 °F (36.8 °C) 100 %      MAP       --         Physical Exam    Nursing note and vitals reviewed.  Constitutional: No distress.   HENT:   Head: Normocephalic.   Eyes: No scleral icterus.   Cardiovascular:  Normal rate.           Pulmonary/Chest: Effort normal. No stridor. No respiratory distress.   Abdominal: There is no guarding.   Musculoskeletal:      Comments: Right 4th digit nailbed erythema and swelling, no fluctuance on 4th distal pad.  No visualized purulence.  No pain along flexor tendon or circumferential swelling or finger held in flexed position.  Or pain with extension of finger     Neurological: She is alert.   Skin: No rash noted. There is erythema.         ED Course   Procedures  Labs Reviewed   HIV 1 / 2 ANTIBODY   HEPATITIS C ANTIBODY          Imaging Results    None          Medications   HYDROcodone-acetaminophen  mg per tablet 1 tablet (1 tablet Oral Given 10/6/24 2007)   promethazine tablet 25 mg (25 mg Oral Given 10/6/24 2007)   neomycin-bacitracin-polymyxin ointment ( Topical (Top) Given 10/6/24 2008)   clindamycin injection 600 mg (600 mg Intramuscular Given 10/6/24 2012)     Medical Decision Making  39-year-old female presents with right 4th digit finger infection exam consistent with paronychia, patient express pus prior to arrival, use 11 blade to see if I can obtain any or pus with incision around nailbed region.  No purulent return, just edematous tissue, no definitive signs of abscess at this time needing further I&D.  Do not suspect flexor tenosynovitis, we will give IM antibiotics here, topical antibiotic ointment, pain control and discharge with oral antibiotics and antibiotic ointment and strict return precautions.  Significant other at bedside will drive her home.  Patient voiced understanding and agrees with plan    Risk  OTC drugs.  Prescription drug management.                                       Clinical Impression:  Final diagnoses:  [L03.011] Paronychia of finger, right (Primary)          ED Disposition Condition    Discharge Stable          ED Prescriptions       Medication Sig Dispense Start Date End Date Auth. Provider    sulfamethoxazole-trimethoprim 800-160mg (BACTRIM DS) 800-160 mg Tab Take 1 tablet by mouth 2 (two) times daily. for 7 days 14 tablet 10/6/2024 10/13/2024 Rios Escobar Jr., DO    bacitracin 500 unit/gram Oint Apply topically 2 (two) times daily. for 7 days 30 g 10/6/2024 10/13/2024 Rios Escobar Jr.,           Follow-up Information    None          Rios Escobar Jr.,   10/06/24 2015

## 2024-10-13 ENCOUNTER — HOSPITAL ENCOUNTER (EMERGENCY)
Facility: HOSPITAL | Age: 39
Discharge: HOME | End: 2024-10-13
Attending: EMERGENCY MEDICINE
Payer: MEDICAID

## 2024-10-13 VITALS
HEIGHT: 63 IN | BODY MASS INDEX: 49.61 KG/M2 | OXYGEN SATURATION: 95 % | TEMPERATURE: 98 F | RESPIRATION RATE: 20 BRPM | WEIGHT: 280 LBS | DIASTOLIC BLOOD PRESSURE: 70 MMHG | SYSTOLIC BLOOD PRESSURE: 140 MMHG | HEART RATE: 93 BPM

## 2024-10-13 DIAGNOSIS — L03.019 PARONYCHIA OF FINGER, UNSPECIFIED LATERALITY: Primary | ICD-10-CM

## 2024-10-13 PROCEDURE — 25000003 PHARM REV CODE 250: Performed by: NURSE PRACTITIONER

## 2024-10-13 PROCEDURE — 10060 I&D ABSCESS SIMPLE/SINGLE: CPT

## 2024-10-13 PROCEDURE — 99284 EMERGENCY DEPT VISIT MOD MDM: CPT | Mod: 25

## 2024-10-13 PROCEDURE — 63600175 PHARM REV CODE 636 W HCPCS: Performed by: NURSE PRACTITIONER

## 2024-10-13 RX ORDER — DOXYCYCLINE 100 MG/1
100 CAPSULE ORAL 2 TIMES DAILY
Qty: 14 CAPSULE | Refills: 0 | Status: SHIPPED | OUTPATIENT
Start: 2024-10-13 | End: 2024-10-20

## 2024-10-13 RX ORDER — KETOROLAC TROMETHAMINE 10 MG/1
10 TABLET, FILM COATED ORAL EVERY 8 HOURS PRN
Qty: 20 TABLET | Refills: 0 | Status: SHIPPED | OUTPATIENT
Start: 2024-10-13 | End: 2024-10-18

## 2024-10-13 RX ORDER — LIDOCAINE HYDROCHLORIDE 10 MG/ML
5 INJECTION, SOLUTION EPIDURAL; INFILTRATION; INTRACAUDAL; PERINEURAL
Status: COMPLETED | OUTPATIENT
Start: 2024-10-13 | End: 2024-10-13

## 2024-10-13 RX ORDER — MUPIROCIN 20 MG/G
OINTMENT TOPICAL 2 TIMES DAILY
Qty: 22 G | Refills: 1 | Status: SHIPPED | OUTPATIENT
Start: 2024-10-13

## 2024-10-13 RX ORDER — MUPIROCIN 20 MG/G
OINTMENT TOPICAL DAILY
Status: DISCONTINUED | OUTPATIENT
Start: 2024-10-13 | End: 2024-10-13 | Stop reason: HOSPADM

## 2024-10-13 RX ADMIN — MUPIROCIN 1 G: 20 OINTMENT TOPICAL at 09:10

## 2024-10-13 RX ADMIN — LIDOCAINE HYDROCHLORIDE 50 MG: 10 INJECTION, SOLUTION EPIDURAL; INFILTRATION; INTRACAUDAL; PERINEURAL at 09:10

## 2024-10-14 NOTE — ED PROVIDER NOTES
Encounter Date: 10/13/2024       History     Chief Complaint   Patient presents with    Hand Pain     Pt has red swollen finger.  Pt prescribed and compliant with antibiotics     Ifrah Esquivel is a 39 year old female with pmh  anxiety, PTSD, bipolar disorder, HTN presenting to the ED with c/o pain and swelling to the tip of her right 4th digit. She began having symptoms one week ago and was seen at another ED. She was prescribed Bactrim but states the area has not improved. She initially had green/purulent drainage one week ago but has had none since. She denies fever.       Review of patient's allergies indicates:   Allergen Reactions    Asa [aspirin]     Aspirin Other (See Comments)     avoids due to asthma    Ultram [tramadol]     Ultram [tramadol] Nausea And Vomiting    Zofran [ondansetron hcl (pf)]     Zofran [ondansetron hcl (pf)] Other (See Comments)     Gives migraines     Past Medical History:   Diagnosis Date    Anxiety     Asthma     Bipolar affective disorder, current episode manic with psychotic symptoms     Hypertension     Manic bipolar I disorder     Mental health disorder     PTSD (post-traumatic stress disorder)     Substance abuse      Past Surgical History:   Procedure Laterality Date    APPENDECTOMY      c sections      x 3    CHOLECYSTECTOMY      ESOPHAGOGASTRODUODENOSCOPY N/A 9/15/2022    Procedure: EGD (ESOPHAGOGASTRODUODENOSCOPY);  Surgeon: Law Sheriff MD;  Location: [facility];  Service: Endoscopy;  Laterality: N/A;    HYSTERECTOMY      TONSILLECTOMY      tonsils and adenoidectomy       Family History   Problem Relation Name Age of Onset    Ovarian cancer Mother      Cancer Other m great gm         breast    No Known Problems Father      Diabetes Paternal Grandmother       Social History     Tobacco Use    Smoking status: Every Day     Current packs/day: 0.50     Types: Cigarettes    Smokeless tobacco: Never   Substance Use Topics    Alcohol use: Never    Drug use: Yes     Types:  Marijuana     Comment: daily     Review of Systems   Constitutional:  Negative for fever.   HENT:  Negative for sore throat.    Respiratory:  Negative for shortness of breath.    Cardiovascular:  Negative for chest pain.   Gastrointestinal:  Negative for nausea.   Genitourinary:  Negative for dysuria.   Musculoskeletal:  Negative for back pain.   Skin:  Positive for color change. Negative for rash.   Neurological:  Negative for weakness.   Hematological:  Does not bruise/bleed easily.       Physical Exam     Initial Vitals [10/13/24 1947]   BP Pulse Resp Temp SpO2   (!) 165/90 88 20 98.2 °F (36.8 °C) 99 %      MAP       --         Physical Exam    Nursing note and vitals reviewed.  Constitutional: She appears well-developed and well-nourished. She is not diaphoretic. No distress.   HENT:   Head: Normocephalic and atraumatic. Mouth/Throat: Oropharynx is clear and moist.   Eyes: Conjunctivae are normal.   Neck: Neck supple.   Cardiovascular:  Normal rate, regular rhythm, normal heart sounds and intact distal pulses.     Exam reveals no gallop and no friction rub.       No murmur heard.  Pulmonary/Chest: Breath sounds normal. No respiratory distress. She has no wheezes. She has no rhonchi. She has no rales.   Musculoskeletal:         General: Normal range of motion.      Cervical back: Neck supple.      Comments: Right 4th digit cuticle and nailfold erythema and swelling. Fluctuance to lateral nailfold but no visualized purulence.  No pain along flexor tendon or circumferential swelling of finger. No pain with extension of finger.      Neurological: She is alert and oriented to person, place, and time.   Skin: No rash noted. No erythema.   Psychiatric: Her speech is normal.         ED Course   I & D - Incision and Drainage    Date/Time: 10/13/2024 11:08 PM  Location procedure was performed: [facility] EMERGENCY DEPARTMENT    Performed by: Cass Pate NP  Authorized by: Hong Spence MD  Type: abscess  Body area: upper  extremity  Location details: right ring finger  Anesthesia: digital block    Anesthesia:  Local Anesthetic: lidocaine 1% without epinephrine  Anesthetic total: 10 mL  Scalpel size: 11  Incision type: single straight  Complexity: simple  Drainage: bloody  Drainage amount: scant  Wound treatment: incision and drainage  Patient tolerance: Patient tolerated the procedure well with no immediate complications  Comments: Initial attempt by me with no return. Digital block performed by Dr. Spence with repeat incision; no return         Labs Reviewed - No data to display       Imaging Results    None          Medications   mupirocin 2 % ointment (1 g Topical (Top) Given 10/13/24 2115)   LIDOcaine (PF) 10 mg/ml (1%) injection 50 mg (50 mg Infiltration Given by Provider 10/13/24 2108)   LIDOcaine (PF) 10 mg/ml (1%) injection 50 mg (50 mg Infiltration Given by Provider 10/13/24 2109)   LIDOcaine (PF) 10 mg/ml (1%) injection 50 mg (50 mg Infiltration Given by Provider 10/13/24 2115)     Medical Decision Making  This is an urgent evaluation of a 39 year old female presenting to the ED with a paronychia to the right 4th digit. She had full ROM of the digit. No evidence of flexor tenosynovitis. NO felon. Initial attempt by me with local anesthesia but there was no return after incision. DIgital block then performed by Dr. Spence with second attempt. No return of purulent drainage noted. Patient instructed to start doxycycline and mupirocin. Return to the ED for any worsening symptoms. Based on my clinical evaluation, I do not appreciate any immediate, emergent, or life threatening condition or etiology that warrants additional workup today and feel that the patient can be discharged with close follow up care.       Risk  Prescription drug management.                                      Clinical Impression:  Final diagnoses:  [L03.019] Paronychia of finger, unspecified laterality (Primary)          ED Disposition Condition    Discharge            ED Prescriptions       Medication Sig Dispense Start Date End Date Auth. Provider    doxycycline (VIBRAMYCIN) 100 MG Cap Take 1 capsule (100 mg total) by mouth 2 (two) times daily. for 7 days 14 capsule 10/13/2024 10/20/2024 Cass Pate NP    mupirocin (BACTROBAN) 2 % ointment Apply topically 2 (two) times daily. 22 g 10/13/2024 -- Cass Pate NP    ketorolac (TORADOL) 10 mg tablet Take 1 tablet (10 mg total) by mouth every 8 (eight) hours as needed for Pain. 20 tablet 10/13/2024 10/18/2024 Cass Pate NP          Follow-up Information       Follow up With Specialties Details Why Contact Info Additional Information    [facility] - Emergency Dept Emergency Medicine  As needed, For wound re-check [address] [facility] [id number] [phone] 1st floor    Jorge Lomeli, FNP-C Family Medicine   [address] [place] [address]  [phone]                Cass Pate NP  10/13/24 2569     Hemigard Intro: Due to skin fragility and wound tension, it was decided to use HEMIGARD adhesive retention suture devices to permit a linear closure. The skin was cleaned and dried for a 6cm distance away from the wound. Excessive hair, if present, was removed to allow for adhesion.

## 2024-10-28 ENCOUNTER — HOSPITAL ENCOUNTER (EMERGENCY)
Facility: HOSPITAL | Age: 39
Discharge: HOME | End: 2024-10-28
Attending: EMERGENCY MEDICINE
Payer: MEDICAID

## 2024-10-28 VITALS
DIASTOLIC BLOOD PRESSURE: 74 MMHG | HEIGHT: 63 IN | OXYGEN SATURATION: 98 % | HEART RATE: 82 BPM | SYSTOLIC BLOOD PRESSURE: 144 MMHG | RESPIRATION RATE: 18 BRPM | WEIGHT: 260 LBS | TEMPERATURE: 98 F | BODY MASS INDEX: 46.07 KG/M2

## 2024-10-28 DIAGNOSIS — H60.501 ACUTE OTITIS EXTERNA OF RIGHT EAR, UNSPECIFIED TYPE: ICD-10-CM

## 2024-10-28 DIAGNOSIS — J18.9 COMMUNITY ACQUIRED PNEUMONIA OF RIGHT MIDDLE LOBE OF LUNG: Primary | ICD-10-CM

## 2024-10-28 DIAGNOSIS — J45.21 MILD INTERMITTENT ASTHMA WITH ACUTE EXACERBATION: ICD-10-CM

## 2024-10-28 DIAGNOSIS — R09.89 CHEST CONGESTION: ICD-10-CM

## 2024-10-28 DIAGNOSIS — R05.9 COUGH: ICD-10-CM

## 2024-10-28 LAB
GROUP A STREP, MOLECULAR: NEGATIVE
INFLUENZA A, MOLECULAR: NEGATIVE
INFLUENZA B, MOLECULAR: NEGATIVE
SARS-COV-2 RDRP RESP QL NAA+PROBE: NEGATIVE
SPECIMEN SOURCE: NORMAL

## 2024-10-28 PROCEDURE — 87502 INFLUENZA DNA AMP PROBE: CPT

## 2024-10-28 PROCEDURE — 96372 THER/PROPH/DIAG INJ SC/IM: CPT

## 2024-10-28 PROCEDURE — 25000242 PHARM REV CODE 250 ALT 637 W/ HCPCS

## 2024-10-28 PROCEDURE — 87635 SARS-COV-2 COVID-19 AMP PRB: CPT

## 2024-10-28 PROCEDURE — 87651 STREP A DNA AMP PROBE: CPT

## 2024-10-28 PROCEDURE — 63600175 PHARM REV CODE 636 W HCPCS

## 2024-10-28 PROCEDURE — 94640 AIRWAY INHALATION TREATMENT: CPT

## 2024-10-28 PROCEDURE — 94761 N-INVAS EAR/PLS OXIMETRY MLT: CPT

## 2024-10-28 PROCEDURE — 99285 EMERGENCY DEPT VISIT HI MDM: CPT | Mod: 25

## 2024-10-28 RX ORDER — FLUTICASONE PROPIONATE 50 MCG
1 SPRAY, SUSPENSION (ML) NASAL 2 TIMES DAILY PRN
Qty: 15 G | Refills: 0 | Status: SHIPPED | OUTPATIENT
Start: 2024-10-28

## 2024-10-28 RX ORDER — ALBUTEROL SULFATE 0.83 MG/ML
2.5 SOLUTION RESPIRATORY (INHALATION) EVERY 6 HOURS PRN
Qty: 20 EACH | Refills: 0 | Status: SHIPPED | OUTPATIENT
Start: 2024-10-28

## 2024-10-28 RX ORDER — AMOXICILLIN 500 MG/1
1000 TABLET, FILM COATED ORAL EVERY 8 HOURS
Qty: 42 TABLET | Refills: 0 | Status: ON HOLD | OUTPATIENT
Start: 2024-10-28 | End: 2024-10-31 | Stop reason: HOSPADM

## 2024-10-28 RX ORDER — CETIRIZINE HYDROCHLORIDE 10 MG/1
10 TABLET ORAL DAILY PRN
Qty: 30 TABLET | Refills: 0 | Status: SHIPPED | OUTPATIENT
Start: 2024-10-28

## 2024-10-28 RX ORDER — ALBUTEROL SULFATE 90 UG/1
1-2 INHALANT RESPIRATORY (INHALATION) EVERY 6 HOURS PRN
Qty: 8 G | Refills: 0 | Status: SHIPPED | OUTPATIENT
Start: 2024-10-28 | End: 2025-10-28

## 2024-10-28 RX ORDER — OFLOXACIN 3 MG/ML
3 SOLUTION AURICULAR (OTIC) 2 TIMES DAILY
Qty: 10 ML | Refills: 0 | Status: SHIPPED | OUTPATIENT
Start: 2024-10-28 | End: 2024-11-04

## 2024-10-28 RX ORDER — PROMETHAZINE HYDROCHLORIDE AND DEXTROMETHORPHAN HYDROBROMIDE 6.25; 15 MG/5ML; MG/5ML
5 SYRUP ORAL EVERY 4 HOURS PRN
Qty: 118 ML | Refills: 0 | Status: SHIPPED | OUTPATIENT
Start: 2024-10-28

## 2024-10-28 RX ORDER — IPRATROPIUM BROMIDE AND ALBUTEROL SULFATE 2.5; .5 MG/3ML; MG/3ML
3 SOLUTION RESPIRATORY (INHALATION)
Status: COMPLETED | OUTPATIENT
Start: 2024-10-28 | End: 2024-10-28

## 2024-10-28 RX ORDER — METHYLPREDNISOLONE SOD SUCC 125 MG
125 VIAL (EA) INJECTION
Status: COMPLETED | OUTPATIENT
Start: 2024-10-28 | End: 2024-10-28

## 2024-10-28 RX ORDER — BENZONATATE 100 MG/1
100 CAPSULE ORAL 3 TIMES DAILY PRN
Qty: 30 CAPSULE | Refills: 0 | Status: SHIPPED | OUTPATIENT
Start: 2024-10-28

## 2024-10-28 RX ADMIN — IPRATROPIUM BROMIDE AND ALBUTEROL SULFATE 3 ML: .5; 3 SOLUTION RESPIRATORY (INHALATION) at 05:10

## 2024-10-28 RX ADMIN — METHYLPREDNISOLONE SODIUM SUCCINATE 125 MG: 125 INJECTION, POWDER, FOR SOLUTION INTRAMUSCULAR; INTRAVENOUS at 06:10

## 2024-10-29 ENCOUNTER — HOSPITAL ENCOUNTER (OUTPATIENT)
Facility: HOSPITAL | Age: 39
Discharge: HOME | End: 2024-10-31
Attending: STUDENT IN AN ORGANIZED HEALTH CARE EDUCATION/TRAINING PROGRAM | Admitting: HOSPITALIST
Payer: MEDICAID

## 2024-10-29 DIAGNOSIS — A41.9 SEVERE SEPSIS: ICD-10-CM

## 2024-10-29 DIAGNOSIS — R65.20 SEVERE SEPSIS: ICD-10-CM

## 2024-10-29 DIAGNOSIS — A41.9 SEPSIS, DUE TO UNSPECIFIED ORGANISM, UNSPECIFIED WHETHER ACUTE ORGAN DYSFUNCTION PRESENT: Primary | ICD-10-CM

## 2024-10-29 DIAGNOSIS — J18.9 COMMUNITY ACQUIRED PNEUMONIA OF RIGHT MIDDLE LOBE OF LUNG: ICD-10-CM

## 2024-10-29 DIAGNOSIS — R06.03 RESPIRATORY DISTRESS: ICD-10-CM

## 2024-10-29 DIAGNOSIS — R06.02 SHORTNESS OF BREATH: ICD-10-CM

## 2024-10-29 DIAGNOSIS — J45.901 MODERATE ASTHMA WITH EXACERBATION, UNSPECIFIED WHETHER PERSISTENT: ICD-10-CM

## 2024-10-29 DIAGNOSIS — R07.9 CHEST PAIN: ICD-10-CM

## 2024-10-29 DIAGNOSIS — R05.9 COUGH: ICD-10-CM

## 2024-10-29 PROBLEM — F17.210 DEPENDENCE ON NICOTINE FROM CIGARETTES: Status: ACTIVE | Noted: 2024-10-29

## 2024-10-29 LAB
ALBUMIN SERPL BCP-MCNC: 3.5 G/DL (ref 3.5–5.2)
ALP SERPL-CCNC: 87 U/L (ref 40–150)
ALT SERPL W/O P-5'-P-CCNC: 26 U/L (ref 10–44)
ANION GAP SERPL CALC-SCNC: 12 MMOL/L (ref 8–16)
AST SERPL-CCNC: 12 U/L (ref 10–40)
BASOPHILS # BLD AUTO: 0.02 K/UL (ref 0–0.2)
BASOPHILS NFR BLD: 0.1 % (ref 0–1.9)
BILIRUB SERPL-MCNC: 0.2 MG/DL (ref 0.1–1)
BILIRUB UR QL STRIP: NEGATIVE
BUN SERPL-MCNC: 14 MG/DL (ref 6–20)
CALCIUM SERPL-MCNC: 9.2 MG/DL (ref 8.7–10.5)
CHLORIDE SERPL-SCNC: 110 MMOL/L (ref 95–110)
CLARITY UR: CLEAR
CO2 SERPL-SCNC: 16 MMOL/L (ref 23–29)
COLOR UR: YELLOW
CREAT SERPL-MCNC: 0.8 MG/DL (ref 0.5–1.4)
DIFFERENTIAL METHOD BLD: ABNORMAL
EOSINOPHIL # BLD AUTO: 0 K/UL (ref 0–0.5)
EOSINOPHIL NFR BLD: 0 % (ref 0–8)
ERYTHROCYTE [DISTWIDTH] IN BLOOD BY AUTOMATED COUNT: 13.8 % (ref 11.5–14.5)
EST. GFR  (NO RACE VARIABLE): >60 ML/MIN/1.73 M^2
GLUCOSE SERPL-MCNC: 123 MG/DL (ref 70–110)
GLUCOSE UR QL STRIP: NEGATIVE
HCT VFR BLD AUTO: 41.7 % (ref 37–48.5)
HGB BLD-MCNC: 13.6 G/DL (ref 12–16)
HGB UR QL STRIP: NEGATIVE
IMM GRANULOCYTES # BLD AUTO: 0.13 K/UL (ref 0–0.04)
IMM GRANULOCYTES NFR BLD AUTO: 0.4 % (ref 0–0.5)
KETONES UR QL STRIP: NEGATIVE
LACTATE SERPL-SCNC: 2.4 MMOL/L (ref 0.5–2.2)
LACTATE SERPL-SCNC: 2.5 MMOL/L (ref 0.5–2.2)
LEUKOCYTE ESTERASE UR QL STRIP: NEGATIVE
LYMPHOCYTES # BLD AUTO: 1.9 K/UL (ref 1–4.8)
LYMPHOCYTES NFR BLD: 9.6 % (ref 18–48)
MCH RBC QN AUTO: 29.4 PG (ref 27–31)
MCHC RBC AUTO-ENTMCNC: 32.6 G/DL (ref 32–36)
MCV RBC AUTO: 90 FL (ref 82–98)
MONOCYTES # BLD AUTO: 0.7 K/UL (ref 0.3–1)
MONOCYTES NFR BLD: 3.3 % (ref 4–15)
NEUTROPHILS # BLD AUTO: 17.2 K/UL (ref 1.8–7.7)
NEUTROPHILS NFR BLD: 86.3 % (ref 38–73)
NITRITE UR QL STRIP: NEGATIVE
NRBC BLD-RTO: 0 /100 WBC
PH UR STRIP: 6 [PH] (ref 5–8)
PLATELET # BLD AUTO: 332 K/UL (ref 150–450)
PMV BLD AUTO: 9.4 FL (ref 9.2–12.9)
POTASSIUM SERPL-SCNC: 3.9 MMOL/L (ref 3.5–5.1)
PROCALCITONIN SERPL IA-MCNC: <0.02 NG/ML (ref 0–0.5)
PROT SERPL-MCNC: 7.1 G/DL (ref 6–8.4)
PROT UR QL STRIP: NEGATIVE
RBC # BLD AUTO: 4.62 M/UL (ref 4–5.4)
SODIUM SERPL-SCNC: 138 MMOL/L (ref 136–145)
SP GR UR STRIP: 1.02 (ref 1–1.03)
URN SPEC COLLECT METH UR: NORMAL
UROBILINOGEN UR STRIP-ACNC: NEGATIVE EU/DL
WBC # BLD AUTO: 19.94 K/UL (ref 3.9–12.7)

## 2024-10-29 PROCEDURE — 25000242 PHARM REV CODE 250 ALT 637 W/ HCPCS: Performed by: NURSE PRACTITIONER

## 2024-10-29 PROCEDURE — 36415 COLL VENOUS BLD VENIPUNCTURE: CPT

## 2024-10-29 PROCEDURE — 63600175 PHARM REV CODE 636 W HCPCS

## 2024-10-29 PROCEDURE — G0378 HOSPITAL OBSERVATION PER HR: HCPCS

## 2024-10-29 PROCEDURE — 96375 TX/PRO/DX INJ NEW DRUG ADDON: CPT

## 2024-10-29 PROCEDURE — 94799 UNLISTED PULMONARY SVC/PX: CPT

## 2024-10-29 PROCEDURE — 25000003 PHARM REV CODE 250: Performed by: NURSE PRACTITIONER

## 2024-10-29 PROCEDURE — 80053 COMPREHEN METABOLIC PANEL: CPT

## 2024-10-29 PROCEDURE — 25000242 PHARM REV CODE 250 ALT 637 W/ HCPCS

## 2024-10-29 PROCEDURE — 25000003 PHARM REV CODE 250

## 2024-10-29 PROCEDURE — 81003 URINALYSIS AUTO W/O SCOPE: CPT

## 2024-10-29 PROCEDURE — 83605 ASSAY OF LACTIC ACID: CPT

## 2024-10-29 PROCEDURE — 96365 THER/PROPH/DIAG IV INF INIT: CPT

## 2024-10-29 PROCEDURE — 94640 AIRWAY INHALATION TREATMENT: CPT

## 2024-10-29 PROCEDURE — 99406 BEHAV CHNG SMOKING 3-10 MIN: CPT

## 2024-10-29 PROCEDURE — 93005 ELECTROCARDIOGRAM TRACING: CPT

## 2024-10-29 PROCEDURE — 94640 AIRWAY INHALATION TREATMENT: CPT | Mod: XB

## 2024-10-29 PROCEDURE — 63600175 PHARM REV CODE 636 W HCPCS: Performed by: NURSE PRACTITIONER

## 2024-10-29 PROCEDURE — 87040 BLOOD CULTURE FOR BACTERIA: CPT | Mod: 59

## 2024-10-29 PROCEDURE — 94761 N-INVAS EAR/PLS OXIMETRY MLT: CPT

## 2024-10-29 PROCEDURE — 99285 EMERGENCY DEPT VISIT HI MDM: CPT | Mod: 25

## 2024-10-29 PROCEDURE — 85025 COMPLETE CBC W/AUTO DIFF WBC: CPT

## 2024-10-29 PROCEDURE — 84145 PROCALCITONIN (PCT): CPT

## 2024-10-29 PROCEDURE — 99900035 HC TECH TIME PER 15 MIN (STAT)

## 2024-10-29 PROCEDURE — 93010 ELECTROCARDIOGRAM REPORT: CPT | Mod: ,,, | Performed by: INTERNAL MEDICINE

## 2024-10-29 PROCEDURE — 27000221 HC OXYGEN, UP TO 24 HOURS

## 2024-10-29 RX ORDER — IPRATROPIUM BROMIDE AND ALBUTEROL SULFATE 2.5; .5 MG/3ML; MG/3ML
3 SOLUTION RESPIRATORY (INHALATION) EVERY 4 HOURS
Status: DISCONTINUED | OUTPATIENT
Start: 2024-10-29 | End: 2024-10-31 | Stop reason: HOSPADM

## 2024-10-29 RX ORDER — AZITHROMYCIN 250 MG/1
250 TABLET, FILM COATED ORAL DAILY
Status: DISCONTINUED | OUTPATIENT
Start: 2024-10-30 | End: 2024-10-30

## 2024-10-29 RX ORDER — ORPHENADRINE CITRATE 30 MG/ML
30 INJECTION INTRAMUSCULAR; INTRAVENOUS
Status: COMPLETED | OUTPATIENT
Start: 2024-10-29 | End: 2024-10-29

## 2024-10-29 RX ORDER — ACETAMINOPHEN 500 MG
1000 TABLET ORAL
Status: COMPLETED | OUTPATIENT
Start: 2024-10-29 | End: 2024-10-29

## 2024-10-29 RX ORDER — GUAIFENESIN 100 MG/5ML
200 SOLUTION ORAL EVERY 4 HOURS PRN
Status: DISCONTINUED | OUTPATIENT
Start: 2024-10-29 | End: 2024-10-31 | Stop reason: HOSPADM

## 2024-10-29 RX ORDER — TALC
9 POWDER (GRAM) TOPICAL NIGHTLY PRN
Status: DISCONTINUED | OUTPATIENT
Start: 2024-10-29 | End: 2024-10-31 | Stop reason: HOSPADM

## 2024-10-29 RX ORDER — PROMETHAZINE HYDROCHLORIDE 25 MG/1
25 TABLET ORAL
Status: COMPLETED | OUTPATIENT
Start: 2024-10-29 | End: 2024-10-29

## 2024-10-29 RX ORDER — SIMETHICONE 80 MG
1 TABLET,CHEWABLE ORAL 4 TIMES DAILY PRN
Status: DISCONTINUED | OUTPATIENT
Start: 2024-10-29 | End: 2024-10-31 | Stop reason: HOSPADM

## 2024-10-29 RX ORDER — LANOLIN ALCOHOL/MO/W.PET/CERES
800 CREAM (GRAM) TOPICAL
Status: DISCONTINUED | OUTPATIENT
Start: 2024-10-29 | End: 2024-10-31 | Stop reason: HOSPADM

## 2024-10-29 RX ORDER — LORAZEPAM 1 MG/1
2 TABLET ORAL
Status: COMPLETED | OUTPATIENT
Start: 2024-10-29 | End: 2024-10-29

## 2024-10-29 RX ORDER — ALUMINUM HYDROXIDE, MAGNESIUM HYDROXIDE, AND SIMETHICONE 1200; 120; 1200 MG/30ML; MG/30ML; MG/30ML
30 SUSPENSION ORAL 4 TIMES DAILY PRN
Status: DISCONTINUED | OUTPATIENT
Start: 2024-10-29 | End: 2024-10-31 | Stop reason: HOSPADM

## 2024-10-29 RX ORDER — PHENTERMINE HYDROCHLORIDE 37.5 MG/1
18.75 TABLET ORAL 2 TIMES DAILY
COMMUNITY
Start: 2024-09-03

## 2024-10-29 RX ORDER — ACETAMINOPHEN 325 MG/1
650 TABLET ORAL EVERY 4 HOURS PRN
Status: DISCONTINUED | OUTPATIENT
Start: 2024-10-29 | End: 2024-10-31 | Stop reason: HOSPADM

## 2024-10-29 RX ORDER — SODIUM CHLORIDE 0.9 % (FLUSH) 0.9 %
10 SYRINGE (ML) INJECTION EVERY 8 HOURS PRN
Status: DISCONTINUED | OUTPATIENT
Start: 2024-10-29 | End: 2024-10-31 | Stop reason: HOSPADM

## 2024-10-29 RX ORDER — LAMOTRIGINE 25 MG/1
25 TABLET ORAL DAILY
Status: DISCONTINUED | OUTPATIENT
Start: 2024-10-30 | End: 2024-10-30

## 2024-10-29 RX ORDER — ACETAMINOPHEN 325 MG/1
650 TABLET ORAL EVERY 6 HOURS PRN
Status: DISCONTINUED | OUTPATIENT
Start: 2024-10-29 | End: 2024-10-31 | Stop reason: HOSPADM

## 2024-10-29 RX ORDER — BENZONATATE 100 MG/1
100 CAPSULE ORAL ONCE
Status: COMPLETED | OUTPATIENT
Start: 2024-10-29 | End: 2024-10-29

## 2024-10-29 RX ORDER — CYCLOBENZAPRINE HCL 5 MG
10 TABLET ORAL ONCE
Status: COMPLETED | OUTPATIENT
Start: 2024-10-29 | End: 2024-10-29

## 2024-10-29 RX ORDER — LAMOTRIGINE 25 MG/1
25 TABLET ORAL DAILY
COMMUNITY
Start: 2024-10-16

## 2024-10-29 RX ORDER — SODIUM,POTASSIUM PHOSPHATES 280-250MG
2 POWDER IN PACKET (EA) ORAL
Status: DISCONTINUED | OUTPATIENT
Start: 2024-10-29 | End: 2024-10-31 | Stop reason: HOSPADM

## 2024-10-29 RX ORDER — IBUPROFEN 200 MG
24 TABLET ORAL
Status: DISCONTINUED | OUTPATIENT
Start: 2024-10-29 | End: 2024-10-31 | Stop reason: HOSPADM

## 2024-10-29 RX ORDER — CEFTRIAXONE 1 G/1
1 INJECTION, POWDER, FOR SOLUTION INTRAMUSCULAR; INTRAVENOUS
Status: DISCONTINUED | OUTPATIENT
Start: 2024-10-30 | End: 2024-10-31 | Stop reason: HOSPADM

## 2024-10-29 RX ORDER — IPRATROPIUM BROMIDE AND ALBUTEROL SULFATE 2.5; .5 MG/3ML; MG/3ML
3 SOLUTION RESPIRATORY (INHALATION)
Status: COMPLETED | OUTPATIENT
Start: 2024-10-29 | End: 2024-10-29

## 2024-10-29 RX ORDER — CEFTRIAXONE 2 G/1
2 INJECTION, POWDER, FOR SOLUTION INTRAMUSCULAR; INTRAVENOUS ONCE
Status: COMPLETED | OUTPATIENT
Start: 2024-10-29 | End: 2024-10-29

## 2024-10-29 RX ORDER — IBUPROFEN 200 MG
16 TABLET ORAL
Status: DISCONTINUED | OUTPATIENT
Start: 2024-10-29 | End: 2024-10-31 | Stop reason: HOSPADM

## 2024-10-29 RX ORDER — BENZPHETAMINE HYDROCHLORIDE 50 MG/1
1 TABLET ORAL 3 TIMES DAILY
COMMUNITY
Start: 2024-10-01

## 2024-10-29 RX ORDER — GLUCAGON 1 MG
1 KIT INJECTION
Status: DISCONTINUED | OUTPATIENT
Start: 2024-10-29 | End: 2024-10-31 | Stop reason: HOSPADM

## 2024-10-29 RX ORDER — NALOXONE HCL 0.4 MG/ML
0.02 VIAL (ML) INJECTION
Status: DISCONTINUED | OUTPATIENT
Start: 2024-10-29 | End: 2024-10-31 | Stop reason: HOSPADM

## 2024-10-29 RX ORDER — QUETIAPINE FUMARATE 100 MG/1
300 TABLET, FILM COATED ORAL NIGHTLY
Status: DISCONTINUED | OUTPATIENT
Start: 2024-10-29 | End: 2024-10-31 | Stop reason: HOSPADM

## 2024-10-29 RX ADMIN — IPRATROPIUM BROMIDE AND ALBUTEROL SULFATE 3 ML: .5; 3 SOLUTION RESPIRATORY (INHALATION) at 04:10

## 2024-10-29 RX ADMIN — QUETIAPINE 300 MG: 100 TABLET ORAL at 09:10

## 2024-10-29 RX ADMIN — IPRATROPIUM BROMIDE AND ALBUTEROL SULFATE 3 ML: .5; 3 SOLUTION RESPIRATORY (INHALATION) at 01:10

## 2024-10-29 RX ADMIN — ACETAMINOPHEN 1000 MG: 500 TABLET ORAL at 07:10

## 2024-10-29 RX ADMIN — AZITHROMYCIN MONOHYDRATE 500 MG: 500 INJECTION, POWDER, LYOPHILIZED, FOR SOLUTION INTRAVENOUS at 02:10

## 2024-10-29 RX ADMIN — ORPHENADRINE CITRATE 30 MG: 60 INJECTION INTRAMUSCULAR; INTRAVENOUS at 02:10

## 2024-10-29 RX ADMIN — SODIUM CHLORIDE 1000 ML: 9 INJECTION, SOLUTION INTRAVENOUS at 02:10

## 2024-10-29 RX ADMIN — CEFTRIAXONE 2 G: 2 INJECTION, POWDER, FOR SOLUTION INTRAMUSCULAR; INTRAVENOUS at 02:10

## 2024-10-29 RX ADMIN — PROMETHAZINE HYDROCHLORIDE 25 MG: 25 TABLET ORAL at 03:10

## 2024-10-29 RX ADMIN — BENZONATATE 100 MG: 100 CAPSULE ORAL at 07:10

## 2024-10-29 RX ADMIN — GUAIFENESIN 200 MG: 200 SOLUTION ORAL at 09:10

## 2024-10-29 RX ADMIN — METHYLPREDNISOLONE SODIUM SUCCINATE 80 MG: 40 INJECTION, POWDER, FOR SOLUTION INTRAMUSCULAR; INTRAVENOUS at 09:10

## 2024-10-29 RX ADMIN — IPRATROPIUM BROMIDE AND ALBUTEROL SULFATE 3 ML: .5; 3 SOLUTION RESPIRATORY (INHALATION) at 08:10

## 2024-10-29 RX ADMIN — CYCLOBENZAPRINE HYDROCHLORIDE 10 MG: 5 TABLET, FILM COATED ORAL at 10:10

## 2024-10-29 RX ADMIN — GUAIFENESIN 200 MG: 200 SOLUTION ORAL at 04:10

## 2024-10-29 RX ADMIN — LORAZEPAM 2 MG: 1 TABLET ORAL at 07:10

## 2024-10-29 NOTE — ASSESSMENT & PLAN NOTE
Body mass index is 53.14 kg/m². Morbid obesity complicates all aspects of disease management from diagnostic modalities to treatment. Weight loss encouraged and health benefits explained to patient.

## 2024-10-29 NOTE — ASSESSMENT & PLAN NOTE
Patient with Hypoxic Respiratory failure which is Acute.  she is not on home oxygen. Supplemental oxygen was provided and noted-      .   Signs/symptoms of respiratory failure include- tachypnea, increased work of breathing, respiratory distress, use of accessory muscles, and wheezing. Contributing diagnoses includes - COPD and Pneumonia Labs and images were reviewed. Patient Has not had a recent ABG. Will treat underlying causes and adjust management of respiratory failure as follows- oxygen, DuoNebs, antibiotics

## 2024-10-29 NOTE — ASSESSMENT & PLAN NOTE
Chronic problem  Dangers of cigarette smoking were reviewed with patient in detail for 10 minutes and patient was encouraged to quit. Nicotine replacement options were discussed. Nicotine replacement options were discussed. Nicotine replacement was not prescribed per patient request.

## 2024-10-29 NOTE — HPI
Ifrah Esquivel is a 39 year female who presents emergency room complaining of nonproductive cough, wheezing, shortness for breath.  Patient was seen in the emergency room for same symptoms yesterday.  She was diagnosed with pneumonia, started on outpatient antibiotics and discharged home.  She returns today with worsening of symptoms.  She does endorse some subjective fevers but no measured temperatures.  Symptoms are worse with any exertion.  She reports history of asthma, active smoker, bipolar, and morbid obesity.  ER workup: CBC with leukocytosis of 19,000.  CMP with bicarb 16 glucose 123 lactic acid elevated 2.5.  Chest x-ray today was negative for any acute findings.  Patient was given DuoNebs, steroids, Rocephin and Zithromax.  Will admit to Hospital Medicine for treatment and management.

## 2024-10-29 NOTE — ED PROVIDER NOTES
Encounter Date: 10/29/2024       History     Chief Complaint   Patient presents with    Shortness of Breath     wheezing     39-year-old female with a past medical history PTSD, bipolar 1, hypertension, anxiety, substance abuse, and asthma presents to ED for cough and SOB.  Patient states it started two days ago.  Patient was seen here yesterday and diagnosed with right-sided community-acquired pneumonia.  Patient states she was discharged after feeling much better after of the breathing treatment.  Patient states about 2 hours after she got home symptoms began again.  Patient tried her nebulizers and inhalers with no relief.  She also tried promethazine DM cough syrup and Tessalon Perles with no relief.  Patient states she did take her antibiotics since morning.  No known sick contacts.  No recent travel outside of the [place]. Patient denies any long car rides, long airplane rides, recent surgery, hemoptysis, calf swelling, and history of DVTs.  Patient admits to sore throat, right ear pain, dry cough, wheezing, shortness breath, body aches, congestion, and HA.  Patient denies fever, sweats, chills, runny nose, ear discharge, chest pain, abdominal pain, nausea, vomiting, diarrhea, urinary symptoms, and  dizziness.        Review of patient's allergies indicates:   Allergen Reactions    Asa [aspirin]     Aspirin Other (See Comments)     avoids due to asthma    Ultram [tramadol]     Ultram [tramadol] Nausea And Vomiting    Zofran [ondansetron hcl (pf)]     Zofran [ondansetron hcl (pf)] Other (See Comments)     Gives migraines     Past Medical History:   Diagnosis Date    Anxiety     Asthma     Bipolar affective disorder, current episode manic with psychotic symptoms     Hypertension     Manic bipolar I disorder     Mental health disorder     PTSD (post-traumatic stress disorder)     Substance abuse      Past Surgical History:   Procedure Laterality Date    APPENDECTOMY      c sections      x 3     CHOLECYSTECTOMY      ESOPHAGOGASTRODUODENOSCOPY N/A 9/15/2022    Procedure: EGD (ESOPHAGOGASTRODUODENOSCOPY);  Surgeon: Law Sheriff MD;  Location: [facility];  Service: Endoscopy;  Laterality: N/A;    HYSTERECTOMY      TONSILLECTOMY      tonsils and adenoidectomy       Family History   Problem Relation Name Age of Onset    Ovarian cancer Mother      Cancer Other m great gm         breast    No Known Problems Father      Diabetes Paternal Grandmother       Social History     Tobacco Use    Smoking status: Every Day     Current packs/day: 0.50     Types: Cigarettes    Smokeless tobacco: Never   Substance Use Topics    Alcohol use: Never    Drug use: Yes     Types: Marijuana     Comment: daily     Review of Systems   Constitutional:  Negative for chills, diaphoresis and fever.   HENT:  Positive for congestion, ear pain (right) and sore throat. Negative for ear discharge, hearing loss and rhinorrhea.    Respiratory:  Positive for cough, shortness of breath and wheezing.    Cardiovascular:  Negative for chest pain.   Gastrointestinal:  Negative for abdominal pain, constipation, diarrhea, nausea and vomiting.   Genitourinary:  Negative for decreased urine volume and difficulty urinating.   Musculoskeletal:  Positive for myalgias (Generalized body aches).   Neurological:  Positive for headaches. Negative for dizziness, weakness and light-headedness.       Physical Exam     Initial Vitals [10/29/24 1207]   BP Pulse Resp Temp SpO2   (!) 148/92 89 (!) 22 98.3 °F (36.8 °C) 98 %      MAP       --         Physical Exam    Nursing note and vitals reviewed.  Constitutional: She appears well-developed and well-nourished. She is not diaphoretic. She is active. She does not appear ill. No distress.   HENT:   Head: Normocephalic and atraumatic.   Right Ear: External ear normal.   Left Ear: External ear normal.   Nose: Nose normal. Mouth/Throat: Uvula is midline, oropharynx is clear and moist and mucous membranes are normal.    Eyes: Conjunctivae, EOM and lids are normal. Pupils are equal, round, and reactive to light. Right eye exhibits no discharge. Left eye exhibits no discharge.   Neck: Phonation normal. Neck supple.   Normal range of motion.   Full passive range of motion without pain.     Cardiovascular:  Normal rate and regular rhythm.           Pulmonary/Chest: Effort normal. No respiratory distress. She has wheezes in the right upper field, the right middle field, the right lower field, the left upper field, the left middle field and the left lower field.   Speaking in full sentences   Abdominal: She exhibits no distension.   Musculoskeletal:         General: Normal range of motion.      Cervical back: Full passive range of motion without pain, normal range of motion and neck supple.     Neurological: She is alert and oriented to person, place, and time. GCS eye subscore is 4. GCS verbal subscore is 5. GCS motor subscore is 6.   Skin: Skin is dry. Capillary refill takes less than 2 seconds.         ED Course   Critical Care    Date/Time: 10/29/2024 12:00 PM    Performed by: Rios Escobar Jr., DO  Authorized by: Rios Escobar Jr., DO  Direct patient critical care time: 13 minutes  Ordering / reviewing critical care time: 10 minutes  Documentation critical care time: 10 minutes  Total critical care time (exclusive of procedural time) : 33 minutes        Labs Reviewed   CBC W/ AUTO DIFFERENTIAL - Abnormal       Result Value    WBC 19.94 (*)     RBC 4.62      Hemoglobin 13.6      Hematocrit 41.7      MCV 90      MCH 29.4      MCHC 32.6      RDW 13.8      Platelets 332      MPV 9.4      Immature Granulocytes 0.4      Gran # (ANC) 17.2 (*)     Immature Grans (Abs) 0.13 (*)     Lymph # 1.9      Mono # 0.7      Eos # 0.0      Baso # 0.02      nRBC 0      Gran % 86.3 (*)     Lymph % 9.6 (*)     Mono % 3.3 (*)     Eosinophil % 0.0      Basophil % 0.1      Differential Method Automated     COMPREHENSIVE METABOLIC PANEL - Abnormal     Sodium 138      Potassium 3.9      Chloride 110      CO2 16 (*)     Glucose 123 (*)     BUN 14      Creatinine 0.8      Calcium 9.2      Total Protein 7.1      Albumin 3.5      Total Bilirubin 0.2      Alkaline Phosphatase 87      AST 12      ALT 26      eGFR >60      Anion Gap 12     LACTIC ACID, PLASMA - Abnormal    Lactate (Lactic Acid) 2.5 (*)    CULTURE, BLOOD   CULTURE, BLOOD   PROCALCITONIN    Procalcitonin <0.02     URINALYSIS, REFLEX TO URINE CULTURE    Specimen UA Urine, Clean Catch      Color, UA Yellow      Appearance, UA Clear      pH, UA 6.0      Specific Gravity, UA 1.020      Protein, UA Negative      Glucose, UA Negative      Ketones, UA Negative      Bilirubin (UA) Negative      Occult Blood UA Negative      Nitrite, UA Negative      Urobilinogen, UA Negative      Leukocytes, UA Negative      Narrative:     Specimen Source->Urine     EKG Readings: (Independently Interpreted)   EKG showed normal sinus rhythm with 88 bpm. SC interval 152 ms. QRS 92 ms.  ms. No stemi noted.  Normal EKG.  Signed by Dr. Escobar.       Imaging Results              X-Ray Chest PA And Lateral (Final result)  Result time 10/29/24 14:13:20      Final result by Barbra Pinedo MD (10/29/24 14:13:20)                   Impression:      No acute cardiopulmonary disease      Electronically signed by: Barbra Pinedo MD  Date:    10/29/2024  Time:    14:13               Narrative:    EXAMINATION:  XR CHEST PA AND LATERAL    CLINICAL HISTORY:  Cough, unspecified    TECHNIQUE:  PA and lateral views of the chest were performed.    COMPARISON:  10/28/2024 and also earlier study 06/24/2024    FINDINGS:  The heart is not enlarged.  The lungs are clear of infiltrate.  No pleural effusion.                                       Medications   lamoTRIgine tablet 25 mg (has no administration in time range)   QUEtiapine tablet 300 mg (has no administration in time range)   sodium chloride 0.9% flush 10 mL (has no administration  in time range)   albuterol-ipratropium 2.5 mg-0.5 mg/3 mL nebulizer solution 3 mL (has no administration in time range)   melatonin tablet 9 mg (has no administration in time range)   simethicone chewable tablet 80 mg (has no administration in time range)   aluminum-magnesium hydroxide-simethicone 200-200-20 mg/5 mL suspension 30 mL (has no administration in time range)   acetaminophen tablet 650 mg (has no administration in time range)   naloxone 0.4 mg/mL injection 0.02 mg (has no administration in time range)   potassium bicarbonate disintegrating tablet 50 mEq (has no administration in time range)   potassium bicarbonate disintegrating tablet 35 mEq (has no administration in time range)   potassium bicarbonate disintegrating tablet 60 mEq (has no administration in time range)   magnesium oxide tablet 800 mg (has no administration in time range)   magnesium oxide tablet 800 mg (has no administration in time range)   potassium, sodium phosphates 280-160-250 mg packet 2 packet (has no administration in time range)   potassium, sodium phosphates 280-160-250 mg packet 2 packet (has no administration in time range)   potassium, sodium phosphates 280-160-250 mg packet 2 packet (has no administration in time range)   glucose chewable tablet 16 g (has no administration in time range)   glucose chewable tablet 24 g (has no administration in time range)   glucagon (human recombinant) injection 1 mg (has no administration in time range)   acetaminophen tablet 650 mg (has no administration in time range)   cefTRIAXone injection 1 g (has no administration in time range)   azithromycin tablet 250 mg (has no administration in time range)   methylPREDNISolone sodium succinate injection 80 mg (has no administration in time range)   dextrose 10% bolus 125 mL 125 mL (has no administration in time range)   dextrose 10% bolus 250 mL 250 mL (has no administration in time range)   guaiFENesin 100 mg/5 ml syrup 200 mg (has no  administration in time range)   albuterol-ipratropium 2.5 mg-0.5 mg/3 mL nebulizer solution 3 mL (3 mLs Nebulization Given 10/29/24 1320)   cefTRIAXone injection 2 g (2 g Intravenous Given 10/29/24 1410)   azithromycin (ZITHROMAX) 500 mg in D5W 250 mL  IVPB (admixture device) (500 mg Intravenous New Bag 10/29/24 1422)   orphenadrine injection 30 mg (30 mg Intravenous Given 10/29/24 1418)   sodium chloride 0.9% bolus 1,000 mL 1,000 mL (0 mLs Intravenous Stopped 10/29/24 1509)   promethazine tablet 25 mg (25 mg Oral Given 10/29/24 1535)     Medical Decision Making  39-year-old female with a past medical history PTSD, bipolar 1, hypertension, anxiety, substance abuse, and asthma presents to ED for cough and SOB.  Patient's chart and medical history reviewed.    Ddx:  Pneumonia  Sepsis  Asthma    Patient's vitals reviewed.  Afebrile and nontoxic-appearing in the ED. patient had wheezing throughout lung fields.  Patient given breathing treatment.  CBC showed elevated white count of 19.94 and immature granulocyte count of 0.13.  Concerns for sepsis at 1302.  Sepsis orders initiated.  Lactic acid is 2.5.  Patient given a L of fluids.  CMP showed a glucose of 123 and a bicarb of 16.  Procalcitonin normal range.  UA unremarkable. EKG showed normal sinus rhythm with 88 bpm. SD interval 152 ms. QRS 92 ms.  ms. No stemi noted.  Normal EKG.  Signed by Dr. Escobar. Chest x-ray was unremarkable; RML consolidation less apparent today per my personal interpretation. Official x-ray interpretation showed No acute cardiopulmonary disease.  Patient is started on prophylactic azithromycin and ceftriaxone.  Discussed this case with Hospital Medicine Dustin Liao NP.  Patient will be admitted for further observation and management.  Patient agrees with this plan.    Amount and/or Complexity of Data Reviewed  Labs: ordered. Decision-making details documented in ED Course.  Radiology: ordered.    Risk  Prescription drug  management.              Attending Attestation:     Physician Attestation Statement for NP/PA:   I personally made/approved the management plan and take responsibility for the patient management.    Other NP/PA Attestation Additions:    History of Present Illness: 39-year-old female history of asthma pneumonia presents with worsening shortness a breath.   Physical Exam: Patient on nasal cannula, intermittent cough and wheezing, no associated tachypnea on my evaluation.   Medical Decision Making: History of asthma worsened with recent pneumonia.  Sepsis protocol initiated, meeting severe sepsis criteria and administered IV antibiotics.  Placed on oxygen for supportive care given hypoxemia and tachypnea.  Patient admitted to hospitalist team for further management evaluation, patient updated and agrees with plan.             ED Course as of 10/29/24 1559   Tue Oct 29, 2024   1302 Concern for sepsis at 1:02 p.m. [KB]   1418 This patient does have evidence of infective focus  My overall impression is Sever Sepsis.  Source: Respiratory  Antibiotics given- Antibiotics (72h ago, onward)    Start     Stop Route Frequency Ordered    10/29/24 1330  azithromycin (ZITHROMAX) 500 mg in D5W 250 mL  IVPB   (admixture device)         10/30/24 0129 IV ED 1 Time 10/29/24 1323      Latest lactate reviewed-  @[id number](lactate,poclac)@  Organ dysfunction indicated by Elevated lactate    Fluid challenge Not needed - patient is not hypotensive      Post- resuscitation assessment No - Post resuscitation assessment not needed       Will Not start Pressors- Levophed for MAP of 65  Source control achieved by: Ceftriaxone,azithromycin   [KB]   1420 WBC(!): 19.94 [KB]   1420 Lactic Acid Level(!): 2.5 [KB]      ED Course User Index  [KB] Rios Escobar Jr.,                            Clinical Impression:  Final diagnoses:  [R05.9] Cough  [R06.02] Shortness of breath  [J18.9] Community acquired pneumonia of right middle lobe of  lung  [A41.9] Sepsis, due to unspecified organism, unspecified whether acute organ dysfunction present (Primary)  [A41.9, R65.20] Severe sepsis  [J45.901] Moderate asthma with exacerbation, unspecified whether persistent  [R06.03] Respiratory distress          ED Disposition Condition    Observation Stable                Holdsworth, Alayna, PA-C  10/29/24 1441       Rios Escobar Jr., DO  10/29/24 3524

## 2024-10-29 NOTE — SUBJECTIVE & OBJECTIVE
Past Medical History:   Diagnosis Date    Anxiety     Asthma     Bipolar affective disorder, current episode manic with psychotic symptoms     Hypertension     Manic bipolar I disorder     Mental health disorder     PTSD (post-traumatic stress disorder)     Substance abuse        Past Surgical History:   Procedure Laterality Date    APPENDECTOMY      c sections      x 3    CHOLECYSTECTOMY      ESOPHAGOGASTRODUODENOSCOPY N/A 9/15/2022    Procedure: EGD (ESOPHAGOGASTRODUODENOSCOPY);  Surgeon: Law Sheriff MD;  Location: [facility];  Service: Endoscopy;  Laterality: N/A;    HYSTERECTOMY      TONSILLECTOMY      tonsils and adenoidectomy         Review of patient's allergies indicates:   Allergen Reactions    Asa [aspirin]     Aspirin Other (See Comments)     avoids due to asthma    Ultram [tramadol]     Ultram [tramadol] Nausea And Vomiting    Zofran [ondansetron hcl (pf)]     Zofran [ondansetron hcl (pf)] Other (See Comments)     Gives migraines       Current Facility-Administered Medications on File Prior to Encounter   Medication    [COMPLETED] albuterol-ipratropium 2.5 mg-0.5 mg/3 mL nebulizer solution 3 mL    [COMPLETED] methylPREDNISolone sodium succinate injection 125 mg     Current Outpatient Medications on File Prior to Encounter   Medication Sig    albuterol (PROVENTIL) 2.5 mg /3 mL (0.083 %) nebulizer solution Take 3 mLs (2.5 mg total) by nebulization every 6 (six) hours as needed for Wheezing or Shortness of Breath. Rescue    albuterol (PROVENTIL/VENTOLIN HFA) 90 mcg/actuation inhaler Inhale 1-2 puffs into the lungs every 6 (six) hours as needed for Wheezing or Shortness of Breath. Rescue    amoxicillin (AMOXIL) 500 MG Tab Take 2 tablets (1,000 mg total) by mouth every 8 (eight) hours. for 7 days    benzonatate (TESSALON) 100 MG capsule Take 1 capsule (100 mg total) by mouth 3 (three) times daily as needed for Cough.    benzphetamine 50 mg Tab Take 1 tablet by mouth 3 (three) times daily. NEW Rx - NOT  YET STARTED    cetirizine (ZYRTEC) 10 MG tablet Take 1 tablet (10 mg total) by mouth daily as needed for Allergies or Rhinitis.    COMBIVENT RESPIMAT  mcg/actuation inhaler Inhale 1 puff into the lungs every 4 (four) hours as needed for Shortness of Breath.    cyclobenzaprine (FLEXERIL) 10 MG tablet Take 10 mg by mouth 3 (three) times daily as needed for Muscle spasms.     fluticasone propionate (FLONASE) 50 mcg/actuation nasal spray 1 spray (50 mcg total) by Each Nostril route 2 (two) times daily as needed for Rhinitis or Allergies.    gabapentin (NEURONTIN) 300 MG capsule Take 600 mg by mouth 2 (two) times daily.     lamoTRIgine (LAMICTAL) 25 MG tablet Take 25 mg by mouth once daily.    mupirocin (BACTROBAN) 2 % ointment Apply topically 2 (two) times daily. (Patient taking differently: Apply 1 g topically 2 (two) times daily.)    ofloxacin (FLOXIN) 0.3 % otic solution Place 3 drops into the right ear 2 (two) times daily. for 7 days    phentermine (ADIPEX-P) 37.5 mg tablet Take 18.75 mg by mouth 2 (two) times daily.    promethazine-dextromethorphan (PROMETHAZINE-DM) 6.25-15 mg/5 mL Syrp Take 5 mLs by mouth every 4 (four) hours as needed (Cough).    QUEtiapine (SEROQUEL) 300 MG Tab Take 300 mg by mouth every evening.    sumatriptan (IMITREX) 25 MG Tab Take 25 mg by mouth as needed.    [DISCONTINUED] acyclovir (ZOVIRAX) 400 MG tablet Take 400 mg by mouth 3 (three) times daily.    [DISCONTINUED] albuterol (ACCUNEB) 1.25 mg/3 mL Nebu Take 1.25 mg by nebulization every 6 (six) hours as needed. Rescue    [DISCONTINUED] albuterol (PROVENTIL) 2.5 mg /3 mL (0.083 %) nebulizer solution Take 3 mLs (2.5 mg total) by nebulization every 4 (four) hours as needed for Wheezing.    [DISCONTINUED] albuterol (PROVENTIL/VENTOLIN HFA) 90 mcg/actuation inhaler Inhale 1-2 puffs into the lungs every 4 (four) hours as needed for Wheezing or Shortness of Breath. Rescue    [DISCONTINUED] albuterol (PROVENTIL/VENTOLIN HFA) 90  mcg/actuation inhaler Inhale 1-2 puffs into the lungs every 6 (six) hours as needed for Wheezing. Rescue    [DISCONTINUED] ALBUTEROL, REFILL, INHL Inhale into the lungs.    [DISCONTINUED] amoxicillin (AMOXIL) 500 MG capsule Take 500 mg by mouth every 12 (twelve) hours.    [DISCONTINUED] amoxicillin-clavulanate 875-125mg (AUGMENTIN) 875-125 mg per tablet Take 1 tablet by mouth 2 (two) times daily.    [DISCONTINUED] benzonatate (TESSALON PERLES) 100 MG capsule Take 1 capsule (100 mg total) by mouth 3 (three) times daily as needed for Cough.    [DISCONTINUED] buprenorphine-naloxone (SUBOXONE) 8-2 mg Film Place 2 films under the tongue in the morning    [DISCONTINUED] buprenorphine-naloxone (SUBOXONE) 8-2 mg Film Place 1 film under the tongue in the evening    [DISCONTINUED] buPROPion (WELLBUTRIN XL) 150 MG TB24 tablet Take 150 mg by mouth once daily.     [DISCONTINUED] diclofenac sodium (VOLTAREN ARTHRITIS PAIN) 1 % Gel Apply 2 g topically 4 (four) times daily. (Patient not taking: Reported on 2024)    [DISCONTINUED] estradioL (ESTRACE) 1 MG tablet Take 1 mg by mouth every evening.     [DISCONTINUED] fluticasone propionate (FLONASE) 50 mcg/actuation nasal spray SMARTSI Spray(s) Both Nares Every Night    [DISCONTINUED] furosemide (LASIX) 20 MG tablet Take 20 mg by mouth as needed (leg swelling).  (Patient not taking: Reported on 2024)    [DISCONTINUED] HYDROcodone-acetaminophen (NORCO) 5-325 mg per tablet Take 1 tablet by mouth every 6 (six) hours as needed for Pain.    [DISCONTINUED] ibuprofen (ADVIL,MOTRIN) 800 MG tablet Take 1 tablet (800 mg total) by mouth every 8 (eight) hours as needed for Pain.    [DISCONTINUED] ipratropium (ATROVENT) 0.02 % nebulizer solution Take 2.5 mLs (500 mcg total) by nebulization 4 (four) times daily. May be added to albuterol treatment and taken together. for 10 days    [DISCONTINUED] ipratropium/albuterol sulfate (COMBIVENT INHL) Inhale into the lungs 4 (four) times daily  as needed.    [DISCONTINUED] lidocaine (LIDODERM) 5 % Place 1 patch onto the skin once daily. Remove & Discard patch within 12 hours or as directed by MD (Patient not taking: Reported on 8/14/2024)    [DISCONTINUED] LIDOcaine (LIDODERM) 5 % Place 1 patch onto the skin once daily. Applied once daily to area of pain.  Remove & Discard patch within 12 hours or as directed by MD (Patient not taking: Reported on 8/14/2024)    [DISCONTINUED] lisinopriL (PRINIVIL,ZESTRIL) 40 MG tablet Take 40 mg by mouth once daily.    [DISCONTINUED] methocarbamoL (ROBAXIN) 750 MG Tab TAKE 2 TABLETS BY MOUTH THREE TIMES DAILY FOR SEVEN DAYS    [DISCONTINUED] methylPREDNISolone (MEDROL DOSEPACK) 4 mg tablet Use as directed    [DISCONTINUED] metoclopramide HCl (REGLAN) 10 MG tablet Take 1 tablet (10 mg total) by mouth every 6 (six) hours as needed (nausea).    [DISCONTINUED] naproxen (NAPROSYN) 375 MG tablet Take 1 tablet (375 mg total) by mouth 2 (two) times daily with meals.    [DISCONTINUED] nicotine (NICODERM CQ) 21 mg/24 hr     [DISCONTINUED] nicotine 21-14-7 mg/24 hr PTDS Place 1 patch onto the skin every 24 hours as needed (nicotine craving).    [DISCONTINUED] paroxetine (PAXIL) 20 MG tablet Take 20 mg by mouth nightly.    [DISCONTINUED] predniSONE (DELTASONE) 10 MG tablet Take 1 tablet (10 mg total) by mouth once daily. Take 4 tabs x 3 days, then take 2 tabs x 3 days, then take 1 tab x 3 days.    [DISCONTINUED] progesterone (PROMETRIUM) 100 MG capsule Take 100 mg by mouth every evening.    [DISCONTINUED] promethazine (PHENERGAN) 25 MG suppository Place 1 suppository (25 mg total) rectally every 6 (six) hours as needed for Nausea.    [DISCONTINUED] promethazine-dextromethorphan (PROMETHAZINE-DM) 6.25-15 mg/5 mL Syrp Take 5 mLs by mouth 4 (four) times daily as needed for Cough.    [DISCONTINUED] QUEtiapine (SEROQUEL XR) 400 MG Tb24 Take 400 mg by mouth every evening.    [DISCONTINUED] QUEtiapine (SEROQUEL) 400 MG tablet Take 400 mg by  mouth nightly.      Family History       Problem Relation (Age of Onset)    Cancer Other    Diabetes Paternal Grandmother    No Known Problems Father    Ovarian cancer Mother          Tobacco Use    Smoking status: Every Day     Current packs/day: 0.50     Types: Cigarettes    Smokeless tobacco: Never   Substance and Sexual Activity    Alcohol use: Never    Drug use: Yes     Types: Marijuana     Comment: daily    Sexual activity: Not on file     Review of Systems   Constitutional:  Positive for activity change. Negative for chills, diaphoresis and fever.   HENT:  Negative for congestion, nosebleeds and tinnitus.    Eyes:  Negative for photophobia and visual disturbance.   Respiratory:  Positive for cough, shortness of breath and wheezing. Negative for chest tightness.    Cardiovascular:  Negative for chest pain, palpitations and leg swelling.   Gastrointestinal:  Negative for abdominal distention, abdominal pain, constipation, diarrhea, nausea and vomiting.   Endocrine: Negative for cold intolerance and heat intolerance.   Genitourinary:  Negative for difficulty urinating, dysuria, frequency, hematuria and urgency.   Musculoskeletal:  Negative for arthralgias, back pain and myalgias.   Skin:  Negative for pallor, rash and wound.   Allergic/Immunologic: Negative for immunocompromised state.   Neurological:  Negative for dizziness, tremors, facial asymmetry, speech difficulty and weakness.   Hematological:  Negative for adenopathy. Does not bruise/bleed easily.   Psychiatric/Behavioral:  Negative for confusion and sleep disturbance. The patient is not nervous/anxious.      Objective:     Vital Signs (Most Recent):  Temp: 98.3 °F (36.8 °C) (10/29/24 1207)  Pulse: 98 (10/29/24 1433)  Resp: 18 (10/29/24 1433)  BP: 123/66 (10/29/24 1433)  SpO2: 97 % (10/29/24 1433) Vital Signs (24h Range):  Temp:  [98.3 °F (36.8 °C)-98.4 °F (36.9 °C)] 98.3 °F (36.8 °C)  Pulse:  [74-98] 98  Resp:  [17-22] 18  SpO2:  [93 %-100 %] 97 %  BP:  ()/(57-92) 123/66     Weight: 136.1 kg (300 lb)  Body mass index is 53.14 kg/m².     Physical Exam  Vitals and nursing note reviewed.   Constitutional:       General: She is not in acute distress.     Appearance: She is well-developed. She is ill-appearing. She is not diaphoretic.   HENT:      Head: Normocephalic.      Mouth/Throat:      Mouth: Mucous membranes are moist.      Pharynx: Oropharynx is clear. No oropharyngeal exudate or posterior oropharyngeal erythema.   Eyes:      General: No scleral icterus.     Conjunctiva/sclera: Conjunctivae normal.      Pupils: Pupils are equal, round, and reactive to light.   Neck:      Vascular: No JVD.   Cardiovascular:      Rate and Rhythm: Normal rate and regular rhythm.      Heart sounds: Normal heart sounds. No murmur heard.     No friction rub. No gallop.   Pulmonary:      Effort: Respiratory distress present.      Breath sounds: Wheezing present. No rales.   Abdominal:      General: Bowel sounds are normal. There is no distension.      Palpations: Abdomen is soft.      Tenderness: There is no abdominal tenderness. There is no guarding or rebound.   Musculoskeletal:         General: No tenderness. Normal range of motion.      Cervical back: Normal range of motion and neck supple.   Lymphadenopathy:      Cervical: No cervical adenopathy.   Skin:     General: Skin is warm and dry.      Capillary Refill: Capillary refill takes less than 2 seconds.      Coloration: Skin is not pale.      Findings: No erythema or rash.   Neurological:      Mental Status: She is alert and oriented to person, place, and time.      Cranial Nerves: No cranial nerve deficit.      Sensory: No sensory deficit.      Coordination: Coordination normal.      Deep Tendon Reflexes: Reflexes normal.   Psychiatric:         Behavior: Behavior normal.         Thought Content: Thought content normal.         Judgment: Judgment normal.              CRANIAL NERVES     CN III, IV, VI   Pupils are equal,  "round, and reactive to light.       Significant Labs: All pertinent labs within the past 24 hours have been reviewed.  Blood Culture: No results for input(s): "LABBLOO" in the last 48 hours.  CBC:   Recent Labs   Lab 10/29/24  1241   WBC 19.94*   HGB 13.6   HCT 41.7        CMP:   Recent Labs   Lab 10/29/24  1241      K 3.9      CO2 16*   *   BUN 14   CREATININE 0.8   CALCIUM 9.2   PROT 7.1   ALBUMIN 3.5   BILITOT 0.2   ALKPHOS 87   AST 12   ALT 26   ANIONGAP 12     Lactic Acid:   Recent Labs   Lab 10/29/24  1241   LACTATE 2.5*       Significant Imaging: I have reviewed all pertinent imaging results/findings within the past 24 hours.    Chest x-ray:   FINDINGS:  The heart is not enlarged.  The lungs are clear of infiltrate.  No pleural effusion.     Impression:     No acute cardiopulmonary disease  "

## 2024-10-29 NOTE — Clinical Note
Diagnosis: Sepsis, due to unspecified organism, unspecified whether acute organ dysfunction present [[id number]]   Future Attending Provider: SIERRA LIU [[id number]]   Place in Observation: [facility] [[id number]]   Special Needs:: No Special Needs [1]

## 2024-10-29 NOTE — H&P
[facility]  History & Physical    Patient Name: Ifrah Esquivel  MRN: [id number]  Patient Class: OP- Observation  Admission Date: 10/29/2024  Attending Physician: Jade Sanchez MD   Primary Care Provider: Jorge Lomeli FNP-C         Patient information was obtained from patient, past medical records, and ER records.     Subjective:     Principal Problem:Acute hypoxemic respiratory failure    Chief Complaint:   Chief Complaint   Patient presents with    Shortness of Breath     wheezing        HPI: Ifrah Esquivel is a 39 year female who presents emergency room complaining of nonproductive cough, wheezing, shortness for breath.  Patient was seen in the emergency room for same symptoms yesterday.  She was diagnosed with pneumonia, started on outpatient antibiotics and discharged home.  She returns today with worsening of symptoms.  She does endorse some subjective fevers but no measured temperatures.  Symptoms are worse with any exertion.  She reports history of asthma, active smoker, bipolar, and morbid obesity.  ER workup: CBC with leukocytosis of 19,000.  CMP with bicarb 16 glucose 123 lactic acid elevated 2.5.  Chest x-ray today was negative for any acute findings.  Patient was given DuoNebs, steroids, Rocephin and Zithromax.  Will admit to Hospital Medicine for treatment and management.    Past Medical History:   Diagnosis Date    Anxiety     Asthma     Bipolar affective disorder, current episode manic with psychotic symptoms     Hypertension     Manic bipolar I disorder     Mental health disorder     PTSD (post-traumatic stress disorder)     Substance abuse        Past Surgical History:   Procedure Laterality Date    APPENDECTOMY      c sections      x 3    CHOLECYSTECTOMY      ESOPHAGOGASTRODUODENOSCOPY N/A 9/15/2022    Procedure: EGD (ESOPHAGOGASTRODUODENOSCOPY);  Surgeon: Law Sheriff MD;  Location: [facility];  Service: Endoscopy;  Laterality: N/A;    HYSTERECTOMY       TONSILLECTOMY      tonsils and adenoidectomy         Review of patient's allergies indicates:   Allergen Reactions    Asa [aspirin]     Aspirin Other (See Comments)     avoids due to asthma    Ultram [tramadol]     Ultram [tramadol] Nausea And Vomiting    Zofran [ondansetron hcl (pf)]     Zofran [ondansetron hcl (pf)] Other (See Comments)     Gives migraines       Current Facility-Administered Medications on File Prior to Encounter   Medication    [COMPLETED] albuterol-ipratropium 2.5 mg-0.5 mg/3 mL nebulizer solution 3 mL    [COMPLETED] methylPREDNISolone sodium succinate injection 125 mg     Current Outpatient Medications on File Prior to Encounter   Medication Sig    albuterol (PROVENTIL) 2.5 mg /3 mL (0.083 %) nebulizer solution Take 3 mLs (2.5 mg total) by nebulization every 6 (six) hours as needed for Wheezing or Shortness of Breath. Rescue    albuterol (PROVENTIL/VENTOLIN HFA) 90 mcg/actuation inhaler Inhale 1-2 puffs into the lungs every 6 (six) hours as needed for Wheezing or Shortness of Breath. Rescue    amoxicillin (AMOXIL) 500 MG Tab Take 2 tablets (1,000 mg total) by mouth every 8 (eight) hours. for 7 days    benzonatate (TESSALON) 100 MG capsule Take 1 capsule (100 mg total) by mouth 3 (three) times daily as needed for Cough.    benzphetamine 50 mg Tab Take 1 tablet by mouth 3 (three) times daily. NEW Rx - NOT YET STARTED    cetirizine (ZYRTEC) 10 MG tablet Take 1 tablet (10 mg total) by mouth daily as needed for Allergies or Rhinitis.    COMBIVENT RESPIMAT  mcg/actuation inhaler Inhale 1 puff into the lungs every 4 (four) hours as needed for Shortness of Breath.    cyclobenzaprine (FLEXERIL) 10 MG tablet Take 10 mg by mouth 3 (three) times daily as needed for Muscle spasms.     fluticasone propionate (FLONASE) 50 mcg/actuation nasal spray 1 spray (50 mcg total) by Each Nostril route 2 (two) times daily as needed for Rhinitis or Allergies.    gabapentin (NEURONTIN) 300 MG capsule Take 600 mg  by mouth 2 (two) times daily.     lamoTRIgine (LAMICTAL) 25 MG tablet Take 25 mg by mouth once daily.    mupirocin (BACTROBAN) 2 % ointment Apply topically 2 (two) times daily. (Patient taking differently: Apply 1 g topically 2 (two) times daily.)    ofloxacin (FLOXIN) 0.3 % otic solution Place 3 drops into the right ear 2 (two) times daily. for 7 days    phentermine (ADIPEX-P) 37.5 mg tablet Take 18.75 mg by mouth 2 (two) times daily.    promethazine-dextromethorphan (PROMETHAZINE-DM) 6.25-15 mg/5 mL Syrp Take 5 mLs by mouth every 4 (four) hours as needed (Cough).    QUEtiapine (SEROQUEL) 300 MG Tab Take 300 mg by mouth every evening.    sumatriptan (IMITREX) 25 MG Tab Take 25 mg by mouth as needed.    [DISCONTINUED] acyclovir (ZOVIRAX) 400 MG tablet Take 400 mg by mouth 3 (three) times daily.    [DISCONTINUED] albuterol (ACCUNEB) 1.25 mg/3 mL Nebu Take 1.25 mg by nebulization every 6 (six) hours as needed. Rescue    [DISCONTINUED] albuterol (PROVENTIL) 2.5 mg /3 mL (0.083 %) nebulizer solution Take 3 mLs (2.5 mg total) by nebulization every 4 (four) hours as needed for Wheezing.    [DISCONTINUED] albuterol (PROVENTIL/VENTOLIN HFA) 90 mcg/actuation inhaler Inhale 1-2 puffs into the lungs every 4 (four) hours as needed for Wheezing or Shortness of Breath. Rescue    [DISCONTINUED] albuterol (PROVENTIL/VENTOLIN HFA) 90 mcg/actuation inhaler Inhale 1-2 puffs into the lungs every 6 (six) hours as needed for Wheezing. Rescue    [DISCONTINUED] ALBUTEROL, REFILL, INHL Inhale into the lungs.    [DISCONTINUED] amoxicillin (AMOXIL) 500 MG capsule Take 500 mg by mouth every 12 (twelve) hours.    [DISCONTINUED] amoxicillin-clavulanate 875-125mg (AUGMENTIN) 875-125 mg per tablet Take 1 tablet by mouth 2 (two) times daily.    [DISCONTINUED] benzonatate (TESSALON PERLES) 100 MG capsule Take 1 capsule (100 mg total) by mouth 3 (three) times daily as needed for Cough.    [DISCONTINUED] buprenorphine-naloxone (SUBOXONE) 8-2 mg Film  Place 2 films under the tongue in the morning    [DISCONTINUED] buprenorphine-naloxone (SUBOXONE) 8-2 mg Film Place 1 film under the tongue in the evening    [DISCONTINUED] buPROPion (WELLBUTRIN XL) 150 MG TB24 tablet Take 150 mg by mouth once daily.     [DISCONTINUED] diclofenac sodium (VOLTAREN ARTHRITIS PAIN) 1 % Gel Apply 2 g topically 4 (four) times daily. (Patient not taking: Reported on 2024)    [DISCONTINUED] estradioL (ESTRACE) 1 MG tablet Take 1 mg by mouth every evening.     [DISCONTINUED] fluticasone propionate (FLONASE) 50 mcg/actuation nasal spray SMARTSI Spray(s) Both Nares Every Night    [DISCONTINUED] furosemide (LASIX) 20 MG tablet Take 20 mg by mouth as needed (leg swelling).  (Patient not taking: Reported on 2024)    [DISCONTINUED] HYDROcodone-acetaminophen (NORCO) 5-325 mg per tablet Take 1 tablet by mouth every 6 (six) hours as needed for Pain.    [DISCONTINUED] ibuprofen (ADVIL,MOTRIN) 800 MG tablet Take 1 tablet (800 mg total) by mouth every 8 (eight) hours as needed for Pain.    [DISCONTINUED] ipratropium (ATROVENT) 0.02 % nebulizer solution Take 2.5 mLs (500 mcg total) by nebulization 4 (four) times daily. May be added to albuterol treatment and taken together. for 10 days    [DISCONTINUED] ipratropium/albuterol sulfate (COMBIVENT INHL) Inhale into the lungs 4 (four) times daily as needed.    [DISCONTINUED] lidocaine (LIDODERM) 5 % Place 1 patch onto the skin once daily. Remove & Discard patch within 12 hours or as directed by MD (Patient not taking: Reported on 2024)    [DISCONTINUED] LIDOcaine (LIDODERM) 5 % Place 1 patch onto the skin once daily. Applied once daily to area of pain.  Remove & Discard patch within 12 hours or as directed by MD (Patient not taking: Reported on 2024)    [DISCONTINUED] lisinopriL (PRINIVIL,ZESTRIL) 40 MG tablet Take 40 mg by mouth once daily.    [DISCONTINUED] methocarbamoL (ROBAXIN) 750 MG Tab TAKE 2 TABLETS BY MOUTH THREE TIMES DAILY  FOR SEVEN DAYS    [DISCONTINUED] methylPREDNISolone (MEDROL DOSEPACK) 4 mg tablet Use as directed    [DISCONTINUED] metoclopramide HCl (REGLAN) 10 MG tablet Take 1 tablet (10 mg total) by mouth every 6 (six) hours as needed (nausea).    [DISCONTINUED] naproxen (NAPROSYN) 375 MG tablet Take 1 tablet (375 mg total) by mouth 2 (two) times daily with meals.    [DISCONTINUED] nicotine (NICODERM CQ) 21 mg/24 hr     [DISCONTINUED] nicotine 21-14-7 mg/24 hr PTDS Place 1 patch onto the skin every 24 hours as needed (nicotine craving).    [DISCONTINUED] paroxetine (PAXIL) 20 MG tablet Take 20 mg by mouth nightly.    [DISCONTINUED] predniSONE (DELTASONE) 10 MG tablet Take 1 tablet (10 mg total) by mouth once daily. Take 4 tabs x 3 days, then take 2 tabs x 3 days, then take 1 tab x 3 days.    [DISCONTINUED] progesterone (PROMETRIUM) 100 MG capsule Take 100 mg by mouth every evening.    [DISCONTINUED] promethazine (PHENERGAN) 25 MG suppository Place 1 suppository (25 mg total) rectally every 6 (six) hours as needed for Nausea.    [DISCONTINUED] promethazine-dextromethorphan (PROMETHAZINE-DM) 6.25-15 mg/5 mL Syrp Take 5 mLs by mouth 4 (four) times daily as needed for Cough.    [DISCONTINUED] QUEtiapine (SEROQUEL XR) 400 MG Tb24 Take 400 mg by mouth every evening.    [DISCONTINUED] QUEtiapine (SEROQUEL) 400 MG tablet Take 400 mg by mouth nightly.      Family History       Problem Relation (Age of Onset)    Cancer Other    Diabetes Paternal Grandmother    No Known Problems Father    Ovarian cancer Mother          Tobacco Use    Smoking status: Every Day     Current packs/day: 0.50     Types: Cigarettes    Smokeless tobacco: Never   Substance and Sexual Activity    Alcohol use: Never    Drug use: Yes     Types: Marijuana     Comment: daily    Sexual activity: Not on file     Review of Systems   Constitutional:  Positive for activity change. Negative for chills, diaphoresis and fever.   HENT:  Negative for congestion, nosebleeds and  tinnitus.    Eyes:  Negative for photophobia and visual disturbance.   Respiratory:  Positive for cough, shortness of breath and wheezing. Negative for chest tightness.    Cardiovascular:  Negative for chest pain, palpitations and leg swelling.   Gastrointestinal:  Negative for abdominal distention, abdominal pain, constipation, diarrhea, nausea and vomiting.   Endocrine: Negative for cold intolerance and heat intolerance.   Genitourinary:  Negative for difficulty urinating, dysuria, frequency, hematuria and urgency.   Musculoskeletal:  Negative for arthralgias, back pain and myalgias.   Skin:  Negative for pallor, rash and wound.   Allergic/Immunologic: Negative for immunocompromised state.   Neurological:  Negative for dizziness, tremors, facial asymmetry, speech difficulty and weakness.   Hematological:  Negative for adenopathy. Does not bruise/bleed easily.   Psychiatric/Behavioral:  Negative for confusion and sleep disturbance. The patient is not nervous/anxious.      Objective:     Vital Signs (Most Recent):  Temp: 98.3 °F (36.8 °C) (10/29/24 1207)  Pulse: 98 (10/29/24 1433)  Resp: 18 (10/29/24 1433)  BP: 123/66 (10/29/24 1433)  SpO2: 97 % (10/29/24 1433) Vital Signs (24h Range):  Temp:  [98.3 °F (36.8 °C)-98.4 °F (36.9 °C)] 98.3 °F (36.8 °C)  Pulse:  [74-98] 98  Resp:  [17-22] 18  SpO2:  [93 %-100 %] 97 %  BP: ()/(57-92) 123/66     Weight: 136.1 kg (300 lb)  Body mass index is 53.14 kg/m².     Physical Exam  Vitals and nursing note reviewed.   Constitutional:       General: She is not in acute distress.     Appearance: She is well-developed. She is ill-appearing. She is not diaphoretic.   HENT:      Head: Normocephalic.      Mouth/Throat:      Mouth: Mucous membranes are moist.      Pharynx: Oropharynx is clear. No oropharyngeal exudate or posterior oropharyngeal erythema.   Eyes:      General: No scleral icterus.     Conjunctiva/sclera: Conjunctivae normal.      Pupils: Pupils are equal, round, and  "reactive to light.   Neck:      Vascular: No JVD.   Cardiovascular:      Rate and Rhythm: Normal rate and regular rhythm.      Heart sounds: Normal heart sounds. No murmur heard.     No friction rub. No gallop.   Pulmonary:      Effort: Respiratory distress present.      Breath sounds: Wheezing present. No rales.   Abdominal:      General: Bowel sounds are normal. There is no distension.      Palpations: Abdomen is soft.      Tenderness: There is no abdominal tenderness. There is no guarding or rebound.   Musculoskeletal:         General: No tenderness. Normal range of motion.      Cervical back: Normal range of motion and neck supple.   Lymphadenopathy:      Cervical: No cervical adenopathy.   Skin:     General: Skin is warm and dry.      Capillary Refill: Capillary refill takes less than 2 seconds.      Coloration: Skin is not pale.      Findings: No erythema or rash.   Neurological:      Mental Status: She is alert and oriented to person, place, and time.      Cranial Nerves: No cranial nerve deficit.      Sensory: No sensory deficit.      Coordination: Coordination normal.      Deep Tendon Reflexes: Reflexes normal.   Psychiatric:         Behavior: Behavior normal.         Thought Content: Thought content normal.         Judgment: Judgment normal.              CRANIAL NERVES     CN III, IV, VI   Pupils are equal, round, and reactive to light.       Significant Labs: All pertinent labs within the past 24 hours have been reviewed.  Blood Culture: No results for input(s): "LABBLOO" in the last 48 hours.  CBC:   Recent Labs   Lab 10/29/24  1241   WBC 19.94*   HGB 13.6   HCT 41.7        CMP:   Recent Labs   Lab 10/29/24  1241      K 3.9      CO2 16*   *   BUN 14   CREATININE 0.8   CALCIUM 9.2   PROT 7.1   ALBUMIN 3.5   BILITOT 0.2   ALKPHOS 87   AST 12   ALT 26   ANIONGAP 12     Lactic Acid:   Recent Labs   Lab 10/29/24  1241   LACTATE 2.5*       Significant Imaging: I have reviewed all " pertinent imaging results/findings within the past 24 hours.    Chest x-ray:   FINDINGS:  The heart is not enlarged.  The lungs are clear of infiltrate.  No pleural effusion.     Impression:     No acute cardiopulmonary disease  Assessment/Plan:     * Acute hypoxemic respiratory failure  Patient with Hypoxic Respiratory failure which is Acute.  she is not on home oxygen. Supplemental oxygen was provided and noted-      .   Signs/symptoms of respiratory failure include- tachypnea, increased work of breathing, respiratory distress, use of accessory muscles, and wheezing. Contributing diagnoses includes - COPD and Pneumonia Labs and images were reviewed. Patient Has not had a recent ABG. Will treat underlying causes and adjust management of respiratory failure as follows- oxygen, DuoNebs, antibiotics    Dependence on nicotine from cigarettes  Dangers of cigarette smoking were reviewed with patient in detail. Patient was Counseled for 3-10 minutes. Nicotine replacement options were discussed. Nicotine replacement was discussed- not prescribed per patient's request    Essential hypertension  Patients blood pressure range in the last 24 hours was: BP  Min: 99/60  Max: 148/92.The patient's inpatient anti-hypertensive regimen is listed below:  Current Antihypertensives       Plan  - BP is controlled, no changes needed to their regimen  -     Morbid obesity  Body mass index is 53.14 kg/m². Morbid obesity complicates all aspects of disease management from diagnostic modalities to treatment. Weight loss encouraged and health benefits explained to patient.         Bipolar disorder  Chronic problem   Stable         Tobacco smoker within last 12 months  Chronic problem  Dangers of cigarette smoking were reviewed with patient in detail for 10 minutes and patient was encouraged to quit. Nicotine replacement options were discussed. Nicotine replacement options were discussed. Nicotine replacement was not prescribed per patient  request.       Moderate persistent asthma with exacerbation  Acute problem   DuoNebs q.4   Solu-Medrol Q 8   Oxygen p.r.n.   Zithromax   Rocephin        VTE Risk Mitigation (From admission, onward)           Ordered     Place LATRICIA hose  Until discontinued         10/29/24 1544     IP VTE HIGH RISK PATIENT  Once         10/29/24 1544     Place sequential compression device  Until discontinued         10/29/24 1544                         On 10/29/2024, patient should be placed in hospital observation services under my care in collaboration with Dr Sanchez.           Dustin Liao NP  Department of Hospital Medicine  [facility]

## 2024-10-29 NOTE — ED NOTES
Assumed care:  Ifrah Esquivel is awake, alert and oriented x 3, skin warm and dry, in NAD with family at bedside.  Patient states that she came in yesterday for pneumonia, states took 2 doses of antibiotic but states she can not breath today.  CO SOB and wheezing, albuterol treatment given by EMS in route.    Patient identifiers for Ifrah Esquivel checked and correct.  LOC:  Ifrah Esquivel is awake, alert, and aware of environment with an appropriate affect. She is oriented x 3 and speaking appropriately.  APPEARANCE:  She is resting comfortably and in no acute distress. She is clean and well groomed, patient's clothing is properly fastened.  SKIN:  The skin is warm and dry. She has normal skin turgor and moist mucus membranes. Skin is intact; no bruising or breakdown noted.  MUSCULOSKELETAL:  She is moving all extremities well, no obvious deformities noted. Pulses intact.   RESPIRATORY:  Airway is open and patent. Respirations are spontaneous and non-labored with normal effort and rate.  SOB, wheezing  CARDIAC:  She has a normal rate and rhythm. No peripheral edema noted. Capillary refill < 3 seconds.  ABDOMEN:  No distention noted.  Soft and non-tender upon palpation.  NEUROLOGICAL:  PERRL. Facial expression is symmetrical. Hand grasps are equal bilaterally. Normal sensation in all extremities when touched with finger.  Allergies reported:    Review of patient's allergies indicates:   Allergen Reactions    Asa [aspirin]     Aspirin Other (See Comments)     avoids due to asthma    Ultram [tramadol]     Ultram [tramadol] Nausea And Vomiting    Zofran [ondansetron hcl (pf)]     Zofran [ondansetron hcl (pf)] Other (See Comments)     Gives migraines

## 2024-10-29 NOTE — ASSESSMENT & PLAN NOTE
Patients blood pressure range in the last 24 hours was: BP  Min: 99/60  Max: 148/92.The patient's inpatient anti-hypertensive regimen is listed below:  Current Antihypertensives       Plan  - BP is controlled, no changes needed to their regimen  -

## 2024-10-30 LAB
ALBUMIN SERPL BCP-MCNC: 3.4 G/DL (ref 3.5–5.2)
ALP SERPL-CCNC: 94 U/L (ref 40–150)
ALT SERPL W/O P-5'-P-CCNC: 27 U/L (ref 10–44)
ANION GAP SERPL CALC-SCNC: 9 MMOL/L (ref 8–16)
AST SERPL-CCNC: 14 U/L (ref 10–40)
BASOPHILS # BLD AUTO: 0.02 K/UL (ref 0–0.2)
BASOPHILS NFR BLD: 0.1 % (ref 0–1.9)
BILIRUB SERPL-MCNC: 0.1 MG/DL (ref 0.1–1)
BUN SERPL-MCNC: 19 MG/DL (ref 6–20)
CALCIUM SERPL-MCNC: 8.9 MG/DL (ref 8.7–10.5)
CHLORIDE SERPL-SCNC: 111 MMOL/L (ref 95–110)
CO2 SERPL-SCNC: 19 MMOL/L (ref 23–29)
CREAT SERPL-MCNC: 0.9 MG/DL (ref 0.5–1.4)
DIFFERENTIAL METHOD BLD: ABNORMAL
EOSINOPHIL # BLD AUTO: 0 K/UL (ref 0–0.5)
EOSINOPHIL NFR BLD: 0 % (ref 0–8)
ERYTHROCYTE [DISTWIDTH] IN BLOOD BY AUTOMATED COUNT: 14 % (ref 11.5–14.5)
EST. GFR  (NO RACE VARIABLE): >60 ML/MIN/1.73 M^2
GLUCOSE SERPL-MCNC: 208 MG/DL (ref 70–110)
HCT VFR BLD AUTO: 38.6 % (ref 37–48.5)
HGB BLD-MCNC: 12.7 G/DL (ref 12–16)
IMM GRANULOCYTES # BLD AUTO: 0.18 K/UL (ref 0–0.04)
IMM GRANULOCYTES NFR BLD AUTO: 1.1 % (ref 0–0.5)
LACTATE SERPL-SCNC: 2.1 MMOL/L (ref 0.5–2.2)
LYMPHOCYTES # BLD AUTO: 1.6 K/UL (ref 1–4.8)
LYMPHOCYTES NFR BLD: 9.8 % (ref 18–48)
MAGNESIUM SERPL-MCNC: 2 MG/DL (ref 1.6–2.6)
MCH RBC QN AUTO: 29.7 PG (ref 27–31)
MCHC RBC AUTO-ENTMCNC: 32.9 G/DL (ref 32–36)
MCV RBC AUTO: 90 FL (ref 82–98)
MONOCYTES # BLD AUTO: 0.3 K/UL (ref 0.3–1)
MONOCYTES NFR BLD: 1.8 % (ref 4–15)
NEUTROPHILS # BLD AUTO: 13.7 K/UL (ref 1.8–7.7)
NEUTROPHILS NFR BLD: 87.2 % (ref 38–73)
NRBC BLD-RTO: 0 /100 WBC
PHOSPHATE SERPL-MCNC: 1.3 MG/DL (ref 2.7–4.5)
PLATELET # BLD AUTO: 353 K/UL (ref 150–450)
PMV BLD AUTO: 9.9 FL (ref 9.2–12.9)
POTASSIUM SERPL-SCNC: 4 MMOL/L (ref 3.5–5.1)
PROT SERPL-MCNC: 6.7 G/DL (ref 6–8.4)
RBC # BLD AUTO: 4.27 M/UL (ref 4–5.4)
SODIUM SERPL-SCNC: 139 MMOL/L (ref 136–145)
TROPONIN I SERPL DL<=0.01 NG/ML-MCNC: <0.006 NG/ML (ref 0–0.03)
WBC # BLD AUTO: 15.77 K/UL (ref 3.9–12.7)

## 2024-10-30 PROCEDURE — 94640 AIRWAY INHALATION TREATMENT: CPT | Mod: XB

## 2024-10-30 PROCEDURE — 94761 N-INVAS EAR/PLS OXIMETRY MLT: CPT

## 2024-10-30 PROCEDURE — 83605 ASSAY OF LACTIC ACID: CPT

## 2024-10-30 PROCEDURE — 63700000 PHARM REV CODE 250 ALT 637 W/O HCPCS: Performed by: HOSPITALIST

## 2024-10-30 PROCEDURE — 25000003 PHARM REV CODE 250: Performed by: NURSE PRACTITIONER

## 2024-10-30 PROCEDURE — 93005 ELECTROCARDIOGRAM TRACING: CPT

## 2024-10-30 PROCEDURE — 80053 COMPREHEN METABOLIC PANEL: CPT | Performed by: NURSE PRACTITIONER

## 2024-10-30 PROCEDURE — 25000003 PHARM REV CODE 250: Performed by: HOSPITALIST

## 2024-10-30 PROCEDURE — 83735 ASSAY OF MAGNESIUM: CPT | Performed by: NURSE PRACTITIONER

## 2024-10-30 PROCEDURE — 25000242 PHARM REV CODE 250 ALT 637 W/ HCPCS

## 2024-10-30 PROCEDURE — S4991 NICOTINE PATCH NONLEGEND: HCPCS | Performed by: HOSPITALIST

## 2024-10-30 PROCEDURE — 84484 ASSAY OF TROPONIN QUANT: CPT

## 2024-10-30 PROCEDURE — 63600175 PHARM REV CODE 636 W HCPCS: Performed by: NURSE PRACTITIONER

## 2024-10-30 PROCEDURE — 93010 ELECTROCARDIOGRAM REPORT: CPT | Mod: ,,, | Performed by: INTERNAL MEDICINE

## 2024-10-30 PROCEDURE — 36415 COLL VENOUS BLD VENIPUNCTURE: CPT

## 2024-10-30 PROCEDURE — 99900035 HC TECH TIME PER 15 MIN (STAT)

## 2024-10-30 PROCEDURE — 96376 TX/PRO/DX INJ SAME DRUG ADON: CPT

## 2024-10-30 PROCEDURE — 25000242 PHARM REV CODE 250 ALT 637 W/ HCPCS: Performed by: NURSE PRACTITIONER

## 2024-10-30 PROCEDURE — A4216 STERILE WATER/SALINE, 10 ML: HCPCS | Performed by: NURSE PRACTITIONER

## 2024-10-30 PROCEDURE — G0378 HOSPITAL OBSERVATION PER HR: HCPCS

## 2024-10-30 PROCEDURE — 84100 ASSAY OF PHOSPHORUS: CPT | Performed by: NURSE PRACTITIONER

## 2024-10-30 PROCEDURE — 85025 COMPLETE CBC W/AUTO DIFF WBC: CPT | Performed by: NURSE PRACTITIONER

## 2024-10-30 PROCEDURE — 27000221 HC OXYGEN, UP TO 24 HOURS

## 2024-10-30 RX ORDER — CYCLOBENZAPRINE HCL 5 MG
10 TABLET ORAL 3 TIMES DAILY PRN
Status: DISCONTINUED | OUTPATIENT
Start: 2024-10-30 | End: 2024-10-31 | Stop reason: HOSPADM

## 2024-10-30 RX ORDER — LAMOTRIGINE 25 MG/1
25 TABLET ORAL NIGHTLY
Status: DISCONTINUED | OUTPATIENT
Start: 2024-10-30 | End: 2024-10-31 | Stop reason: HOSPADM

## 2024-10-30 RX ORDER — AZITHROMYCIN 250 MG/1
500 TABLET, FILM COATED ORAL DAILY
Status: DISCONTINUED | OUTPATIENT
Start: 2024-10-30 | End: 2024-10-31 | Stop reason: HOSPADM

## 2024-10-30 RX ORDER — NITROGLYCERIN 0.4 MG/1
0.4 TABLET SUBLINGUAL EVERY 5 MIN PRN
Status: DISCONTINUED | OUTPATIENT
Start: 2024-10-30 | End: 2024-10-31 | Stop reason: HOSPADM

## 2024-10-30 RX ORDER — BENZONATATE 100 MG/1
100 CAPSULE ORAL 3 TIMES DAILY PRN
Status: DISCONTINUED | OUTPATIENT
Start: 2024-10-30 | End: 2024-10-31 | Stop reason: HOSPADM

## 2024-10-30 RX ORDER — IBUPROFEN 200 MG
1 TABLET ORAL DAILY
Status: DISCONTINUED | OUTPATIENT
Start: 2024-10-30 | End: 2024-10-31 | Stop reason: HOSPADM

## 2024-10-30 RX ORDER — ALPRAZOLAM 0.25 MG/1
0.5 TABLET ORAL 3 TIMES DAILY PRN
Status: DISCONTINUED | OUTPATIENT
Start: 2024-10-30 | End: 2024-10-31 | Stop reason: HOSPADM

## 2024-10-30 RX ADMIN — AZITHROMYCIN DIHYDRATE 500 MG: 250 TABLET ORAL at 09:10

## 2024-10-30 RX ADMIN — SODIUM CHLORIDE 10 ML: 9 INJECTION, SOLUTION INTRAMUSCULAR; INTRAVENOUS; SUBCUTANEOUS at 06:10

## 2024-10-30 RX ADMIN — IPRATROPIUM BROMIDE AND ALBUTEROL SULFATE 3 ML: .5; 3 SOLUTION RESPIRATORY (INHALATION) at 11:10

## 2024-10-30 RX ADMIN — IPRATROPIUM BROMIDE AND ALBUTEROL SULFATE 3 ML: .5; 3 SOLUTION RESPIRATORY (INHALATION) at 07:10

## 2024-10-30 RX ADMIN — Medication 1 PATCH: at 04:10

## 2024-10-30 RX ADMIN — ALPRAZOLAM 0.5 MG: 0.25 TABLET ORAL at 04:10

## 2024-10-30 RX ADMIN — CEFTRIAXONE 1 G: 1 INJECTION, POWDER, FOR SOLUTION INTRAMUSCULAR; INTRAVENOUS at 06:10

## 2024-10-30 RX ADMIN — IPRATROPIUM BROMIDE AND ALBUTEROL SULFATE 3 ML: .5; 3 SOLUTION RESPIRATORY (INHALATION) at 12:10

## 2024-10-30 RX ADMIN — CYCLOBENZAPRINE HYDROCHLORIDE 10 MG: 5 TABLET, FILM COATED ORAL at 10:10

## 2024-10-30 RX ADMIN — NITROGLYCERIN 0.4 MG: 0.4 TABLET, ORALLY DISINTEGRATING SUBLINGUAL at 03:10

## 2024-10-30 RX ADMIN — POTASSIUM & SODIUM PHOSPHATES POWDER PACK 280-160-250 MG 2 PACKET: 280-160-250 PACK at 01:10

## 2024-10-30 RX ADMIN — METHYLPREDNISOLONE SODIUM SUCCINATE 80 MG: 40 INJECTION, POWDER, FOR SOLUTION INTRAMUSCULAR; INTRAVENOUS at 06:10

## 2024-10-30 RX ADMIN — QUETIAPINE 300 MG: 100 TABLET ORAL at 09:10

## 2024-10-30 RX ADMIN — IPRATROPIUM BROMIDE AND ALBUTEROL SULFATE 3 ML: .5; 3 SOLUTION RESPIRATORY (INHALATION) at 03:10

## 2024-10-30 RX ADMIN — METHYLPREDNISOLONE SODIUM SUCCINATE 80 MG: 40 INJECTION, POWDER, FOR SOLUTION INTRAMUSCULAR; INTRAVENOUS at 09:10

## 2024-10-30 RX ADMIN — CYCLOBENZAPRINE HYDROCHLORIDE 10 MG: 5 TABLET, FILM COATED ORAL at 05:10

## 2024-10-30 RX ADMIN — POTASSIUM & SODIUM PHOSPHATES POWDER PACK 280-160-250 MG 2 PACKET: 280-160-250 PACK at 06:10

## 2024-10-30 RX ADMIN — ALUMINUM HYDROXIDE, MAGNESIUM HYDROXIDE, AND SIMETHICONE 30 ML: 1200; 120; 1200 SUSPENSION ORAL at 09:10

## 2024-10-30 RX ADMIN — LAMOTRIGINE 25 MG: 25 TABLET ORAL at 09:10

## 2024-10-30 RX ADMIN — METHYLPREDNISOLONE SODIUM SUCCINATE 80 MG: 40 INJECTION, POWDER, FOR SOLUTION INTRAMUSCULAR; INTRAVENOUS at 01:10

## 2024-10-30 RX ADMIN — ACETAMINOPHEN 650 MG: 325 TABLET ORAL at 03:10

## 2024-10-30 RX ADMIN — BENZONATATE 100 MG: 100 CAPSULE ORAL at 04:10

## 2024-10-30 RX ADMIN — GUAIFENESIN 200 MG: 200 SOLUTION ORAL at 01:10

## 2024-10-30 NOTE — PLAN OF CARE
Pt alert and oriented x4. VSS and on 2L. Up independent in room. Complaints of leg cramping and HA. Meds given per MAR. Pt complaints of chest pain this morning. NP on call aware. ECG done. Labs collected. VSS and prn nitro given. Pain relieved. Fall and safety precautions in place. Call bell within reach and no needs at this time.

## 2024-10-30 NOTE — ED NOTES
Patient is anxious, states she wants to go home.  States room is too hot, she has a HA, tv does not work, and bed is uncomfortable.  Provider notified.

## 2024-10-30 NOTE — PLAN OF CARE
POC/Meds reviewed, pt verbalized understanding. Vitals stable. Afebrile. Remains on 2L O2. IVPB abx administered. Tele In place-NSR. Up with x1 assist to bathroom. IS at bedside, instructed on use and return demonstration performed. No complaints of pain. Repositions self. Hourly/Q2hr rounding performed, safety maintained. Bed in lowest position, wheels locked, SR up x2, call light in easy reach. No complaints at this time. Will continue to monitor.

## 2024-10-30 NOTE — CARE UPDATE
10/30/24 0314   Patient Assessment/Suction   Level of Consciousness (AVPU) alert   Respiratory Effort Short of breath   Expansion/Accessory Muscles/Retractions no retractions;no use of accessory muscles   All Lung Fields Breath Sounds diminished   KEVIN Breath Sounds diminished;wheezes, expiratory   LLL Breath Sounds diminished   RUL Breath Sounds diminished;wheezes, expiratory   RML Breath Sounds diminished   RLL Breath Sounds diminished   PRE-TX-O2   Device (Oxygen Therapy) nasal cannula   $ Is the patient on Low Flow Oxygen? Yes   Flow (L/min) (Oxygen Therapy) 2   SpO2 96 %   Pulse Oximetry Type Intermittent   $ Pulse Oximetry - Multiple Charge Pulse Oximetry - Multiple   Pulse 87   Resp (!) 22   Aerosol Therapy   $ Aerosol Therapy Charges Aerosol Treatment   Respiratory Treatment Status (SVN) given   Treatment Route (SVN) mask;oxygen   Patient Position HOB elevated   Post Treatment Assessment (SVN) increased aeration   Signs of Intolerance (SVN) none   Breath Sounds Post-Respiratory Treatment   Throughout All Fields Post-Treatment All Fields   Throughout All Fields Post-Treatment wheezes, expiratory   Post-treatment Heart Rate (beats/min) 89   Post-treatment Resp Rate (breaths/min) 20   Labs   $ Labs Tech Time 15 min  (ekg done before med scanned)

## 2024-10-30 NOTE — PLAN OF CARE
[facility] - Med/Surg  Initial Discharge Assessment       Primary Care Provider: No, Primary Doctor    Admission Diagnosis: Sepsis, due to unspecified organism, unspecified whether acute organ dysfunction present [A41.9]    Admission Date: 10/29/2024  Expected Discharge Date: 11/1/2024    Transition of Care Barriers: None    Payor: MEDICAID / Plan: [place] / Product Type: Managed Medicaid /     Extended Emergency Contact Information  Primary Emergency Contact: Autumn Baig  Address: [address] [place], [place] [id number] [place] [address]  Home Phone: [phone]  Mobile Phone: [phone]  Relation: Mother    Discharge Plan A: Home with family  Discharge Plan B: Home      [facility] - [facility] [address]  [address] [address] [address]  Phone: [phone] Fax: [phone]    "[id number] - [address] AT [facility] [address] & [address] [address]  Phone: [phone] Fax: [phone]    Discharge assessment completed at bedside with pt.  Verified information on facesheet.  Reports lives at listed address with spouse, is independent with activities and drives self to apts.  States spouse to provide transportation home at discharge.  Denies HH, HD, blood thinners, opt services, recent hospital stay within 30 days and DME except for nebulizer.  Discharge plan is home.    Initial Assessment (most recent)       Adult Discharge Assessment - 10/30/24 1551          Discharge Assessment    Assessment Type Discharge Planning Assessment     Confirmed/corrected address, phone number and insurance Yes     Confirmed Demographics Correct on Facesheet     Source of Information patient     People in Home spouse     Do you expect to return to your current living situation? Yes     Do you have help at home or someone to help you manage your care at home? No     Prior to  hospitilization cognitive status: Alert/Oriented     Current cognitive status: Alert/Oriented     Walking or Climbing Stairs Difficulty no     Dressing/Bathing Difficulty no     Equipment Currently Used at Home nebulizer     Readmission within 30 days? No     Patient currently being followed by outpatient case management? No     Do you currently have service(s) that help you manage your care at home? No     Do you take prescription medications? Yes     Do you have prescription coverage? Yes     Do you have any problems affording any of your prescribed medications? No     Who is going to help you get home at discharge? spouse     How do you get to doctors appointments? car, drives self;family or friend will provide     Are you on dialysis? No     Do you take coumadin? No     Discharge Plan A Home with family     Discharge Plan B Home     DME Needed Upon Discharge  none     Discharge Plan discussed with: Patient     Transition of Care Barriers None

## 2024-10-30 NOTE — SUBJECTIVE & OBJECTIVE
Interval History:  Patient seen and examined.  Continues to have significant he was wheezing and dry cough.  Needs continued care in the hospital for IV steroids, nebulizers, and close monitoring.  Phosphorus low at 1.3, getting replaced.    Review of Systems   Constitutional:  Positive for activity change.   Respiratory:  Positive for cough, shortness of breath and wheezing.      Objective:     Vital Signs (Most Recent):  Temp: 97.9 °F (36.6 °C) (10/30/24 0719)  Pulse: 82 (10/30/24 1113)  Resp: 18 (10/30/24 1113)  BP: 116/70 (10/30/24 0719)  SpO2: 96 % (10/30/24 1113) Vital Signs (24h Range):  Temp:  [97.3 °F (36.3 °C)-98.3 °F (36.8 °C)] 97.9 °F (36.6 °C)  Pulse:  [] 82  Resp:  [16-24] 18  SpO2:  [92 %-100 %] 96 %  BP: ()/(54-92) 116/70     Weight: (!) 173.7 kg (382 lb 15 oz)  Body mass index is 67.83 kg/m².    Intake/Output Summary (Last 24 hours) at 10/30/2024 1116  Last data filed at 10/30/2024 0639  Gross per 24 hour   Intake 1250 ml   Output --   Net 1250 ml         Physical Exam  Constitutional:       General: She is not in acute distress.     Appearance: She is well-developed.   HENT:      Head: Normocephalic and atraumatic.   Eyes:      Pupils: Pupils are equal, round, and reactive to light.   Cardiovascular:      Rate and Rhythm: Normal rate and regular rhythm.      Heart sounds: No murmur heard.  Pulmonary:      Effort: Respiratory distress present.      Breath sounds: Wheezing (diffuse) present. No rales.   Abdominal:      General: Bowel sounds are normal. There is no distension.      Palpations: Abdomen is soft.      Tenderness: There is no abdominal tenderness.   Musculoskeletal:         General: Normal range of motion.   Skin:     General: Skin is warm and dry.      Findings: No rash.   Neurological:      Mental Status: She is alert and oriented to person, place, and time.      Cranial Nerves: No cranial nerve deficit.   Psychiatric:         Behavior: Behavior normal.              Significant Labs: All pertinent labs within the past 24 hours have been reviewed.    Significant Imaging: I have reviewed all pertinent imaging results/findings within the past 24 hours.

## 2024-10-30 NOTE — PROGRESS NOTES
[facility] Medicine  Progress Note    Patient Name: Ifrah sEquivel  MRN: [id number]  Patient Class: OP- Observation   Admission Date: 10/29/2024  Length of Stay: 0 days  Attending Physician: Jade Sanchez MD  Primary Care Provider: Jorge Lomeli FNP-C        Subjective:     Principal Problem:Acute hypoxemic respiratory failure        HPI:  Ifrah Esquivel is a 39 year female who presents emergency room complaining of nonproductive cough, wheezing, shortness for breath.  Patient was seen in the emergency room for same symptoms yesterday.  She was diagnosed with pneumonia, started on outpatient antibiotics and discharged home.  She returns today with worsening of symptoms.  She does endorse some subjective fevers but no measured temperatures.  Symptoms are worse with any exertion.  She reports history of asthma, active smoker, bipolar, and morbid obesity.  ER workup: CBC with leukocytosis of 19,000.  CMP with bicarb 16 glucose 123 lactic acid elevated 2.5.  Chest x-ray today was negative for any acute findings.  Patient was given DuoNebs, steroids, Rocephin and Zithromax.  Will admit to Hospital Medicine for treatment and management.    Overview/Hospital Course:  No notes on file    Interval History:  Patient seen and examined.  Continues to have significant he was wheezing and dry cough.  Needs continued care in the hospital for IV steroids, nebulizers, and close monitoring.  Phosphorus low at 1.3, getting replaced.    Review of Systems   Constitutional:  Positive for activity change.   Respiratory:  Positive for cough, shortness of breath and wheezing.      Objective:     Vital Signs (Most Recent):  Temp: 97.9 °F (36.6 °C) (10/30/24 0719)  Pulse: 82 (10/30/24 1113)  Resp: 18 (10/30/24 1113)  BP: 116/70 (10/30/24 0719)  SpO2: 96 % (10/30/24 1113) Vital Signs (24h Range):  Temp:  [97.3 °F (36.3 °C)-98.3 °F (36.8 °C)] 97.9 °F (36.6 °C)  Pulse:  [] 82  Resp:  [16-24] 18  SpO2:  [92  %-100 %] 96 %  BP: ()/(54-92) 116/70     Weight: (!) 173.7 kg (382 lb 15 oz)  Body mass index is 67.83 kg/m².    Intake/Output Summary (Last 24 hours) at 10/30/2024 1116  Last data filed at 10/30/2024 0639  Gross per 24 hour   Intake 1250 ml   Output --   Net 1250 ml         Physical Exam  Constitutional:       General: She is not in acute distress.     Appearance: She is well-developed.   HENT:      Head: Normocephalic and atraumatic.   Eyes:      Pupils: Pupils are equal, round, and reactive to light.   Cardiovascular:      Rate and Rhythm: Normal rate and regular rhythm.      Heart sounds: No murmur heard.  Pulmonary:      Effort: Respiratory distress present.      Breath sounds: Wheezing (diffuse) present. No rales.   Abdominal:      General: Bowel sounds are normal. There is no distension.      Palpations: Abdomen is soft.      Tenderness: There is no abdominal tenderness.   Musculoskeletal:         General: Normal range of motion.   Skin:     General: Skin is warm and dry.      Findings: No rash.   Neurological:      Mental Status: She is alert and oriented to person, place, and time.      Cranial Nerves: No cranial nerve deficit.   Psychiatric:         Behavior: Behavior normal.             Significant Labs: All pertinent labs within the past 24 hours have been reviewed.    Significant Imaging: I have reviewed all pertinent imaging results/findings within the past 24 hours.    Assessment/Plan:      * Acute hypoxemic respiratory failure  Patient with Hypoxic Respiratory failure which is Acute.  she is not on home oxygen. Supplemental oxygen was provided and noted-      .   Signs/symptoms of respiratory failure include- tachypnea, increased work of breathing, respiratory distress, use of accessory muscles, and wheezing. Contributing diagnoses includes - COPD and Pneumonia Labs and images were reviewed. Patient Has not had a recent ABG. Will treat underlying causes and adjust management of respiratory  [de-identified] : cough, congestion, achy, fatigue, ear pain, fever tmax 101 failure as follows- oxygen, DuoNebs, antibiotics, IV steroids    Dependence on nicotine from cigarettes  Dangers of cigarette smoking were reviewed with patient in detail. Patient was Counseled for 3-10 minutes. Nicotine replacement options were discussed. Nicotine replacement was discussed- not prescribed per patient's request    Essential hypertension  Patients blood pressure range in the last 24 hours was: BP  Min: 99/60  Max: 148/92.The patient's inpatient anti-hypertensive regimen is listed below:  Current Antihypertensives       Plan  - BP is controlled, no changes needed to their regimen  -     Morbid obesity  Body mass index is 53.14 kg/m². Morbid obesity complicates all aspects of disease management from diagnostic modalities to treatment. Weight loss encouraged and health benefits explained to patient.         Bipolar disorder  Chronic problem   Stable         Tobacco smoker within last 12 months  Chronic problem  Dangers of cigarette smoking were reviewed with patient in detail for 10 minutes and patient was encouraged to quit. Nicotine replacement options were discussed. Nicotine replacement options were discussed. Nicotine replacement was not prescribed per patient request.       Moderate persistent asthma with exacerbation  Acute problem   DuoNebs q.4   Solu-Medrol Q 8   Oxygen p.r.n.   Zithromax   Rocephin        VTE Risk Mitigation (From admission, onward)           Ordered     Place LATRICIA hose  Until discontinued         10/29/24 1544     IP VTE HIGH RISK PATIENT  Once         10/29/24 1544     Place sequential compression device  Until discontinued         10/29/24 1544                    Discharge Planning   NATALIE: 11/1/2024     Code Status: Full Code   Is the patient medically ready for discharge?:     Reason for patient still in hospital (select all that apply): Patient trending condition and Treatment                     Jade Sanchez MD  Department of Hospital Medicine   [facility]  Med/Surg     [FreeTextEntry6] : - Nasal congestion\par - Cough\par - No wheezing or stridor\par - Fever\par - Body aches\par - Fatigue\par - Earache/ear tugging\par - Sore throat  \par - Normal appetite\par - No vomiting\par - No diarrhea\par - No sick contacts, no known COVID exposure\par \par

## 2024-10-30 NOTE — ASSESSMENT & PLAN NOTE
Patient with Hypoxic Respiratory failure which is Acute.  she is not on home oxygen. Supplemental oxygen was provided and noted-      .   Signs/symptoms of respiratory failure include- tachypnea, increased work of breathing, respiratory distress, use of accessory muscles, and wheezing. Contributing diagnoses includes - COPD and Pneumonia Labs and images were reviewed. Patient Has not had a recent ABG. Will treat underlying causes and adjust management of respiratory failure as follows- oxygen, DuoNebs, antibiotics, IV steroids

## 2024-10-31 VITALS
WEIGHT: 293 LBS | HEART RATE: 86 BPM | OXYGEN SATURATION: 95 % | BODY MASS INDEX: 51.91 KG/M2 | TEMPERATURE: 98 F | HEIGHT: 63 IN | DIASTOLIC BLOOD PRESSURE: 73 MMHG | SYSTOLIC BLOOD PRESSURE: 146 MMHG | RESPIRATION RATE: 18 BRPM

## 2024-10-31 LAB
ALBUMIN SERPL BCP-MCNC: 3.3 G/DL (ref 3.5–5.2)
ALP SERPL-CCNC: 89 U/L (ref 40–150)
ALT SERPL W/O P-5'-P-CCNC: 26 U/L (ref 10–44)
ANION GAP SERPL CALC-SCNC: 7 MMOL/L (ref 8–16)
AST SERPL-CCNC: 11 U/L (ref 10–40)
BASOPHILS # BLD AUTO: 0.03 K/UL (ref 0–0.2)
BASOPHILS NFR BLD: 0.2 % (ref 0–1.9)
BILIRUB SERPL-MCNC: 0.1 MG/DL (ref 0.1–1)
BUN SERPL-MCNC: 20 MG/DL (ref 6–20)
CALCIUM SERPL-MCNC: 9.1 MG/DL (ref 8.7–10.5)
CHLORIDE SERPL-SCNC: 108 MMOL/L (ref 95–110)
CO2 SERPL-SCNC: 23 MMOL/L (ref 23–29)
CREAT SERPL-MCNC: 1.1 MG/DL (ref 0.5–1.4)
DIFFERENTIAL METHOD BLD: ABNORMAL
EOSINOPHIL # BLD AUTO: 0 K/UL (ref 0–0.5)
EOSINOPHIL NFR BLD: 0 % (ref 0–8)
ERYTHROCYTE [DISTWIDTH] IN BLOOD BY AUTOMATED COUNT: 14 % (ref 11.5–14.5)
EST. GFR  (NO RACE VARIABLE): >60 ML/MIN/1.73 M^2
GLUCOSE SERPL-MCNC: 159 MG/DL (ref 70–110)
HCT VFR BLD AUTO: 36.9 % (ref 37–48.5)
HGB BLD-MCNC: 12.1 G/DL (ref 12–16)
IMM GRANULOCYTES # BLD AUTO: 0.25 K/UL (ref 0–0.04)
IMM GRANULOCYTES NFR BLD AUTO: 1.5 % (ref 0–0.5)
LYMPHOCYTES # BLD AUTO: 1.8 K/UL (ref 1–4.8)
LYMPHOCYTES NFR BLD: 10.7 % (ref 18–48)
MAGNESIUM SERPL-MCNC: 2.1 MG/DL (ref 1.6–2.6)
MCH RBC QN AUTO: 30 PG (ref 27–31)
MCHC RBC AUTO-ENTMCNC: 32.8 G/DL (ref 32–36)
MCV RBC AUTO: 91 FL (ref 82–98)
MONOCYTES # BLD AUTO: 0.7 K/UL (ref 0.3–1)
MONOCYTES NFR BLD: 4 % (ref 4–15)
NEUTROPHILS # BLD AUTO: 14.2 K/UL (ref 1.8–7.7)
NEUTROPHILS NFR BLD: 83.6 % (ref 38–73)
NRBC BLD-RTO: 0 /100 WBC
PHOSPHATE SERPL-MCNC: 2.2 MG/DL (ref 2.7–4.5)
PLATELET # BLD AUTO: 342 K/UL (ref 150–450)
PMV BLD AUTO: 9.8 FL (ref 9.2–12.9)
POTASSIUM SERPL-SCNC: 4.4 MMOL/L (ref 3.5–5.1)
PROT SERPL-MCNC: 6.6 G/DL (ref 6–8.4)
RBC # BLD AUTO: 4.04 M/UL (ref 4–5.4)
SODIUM SERPL-SCNC: 138 MMOL/L (ref 136–145)
WBC # BLD AUTO: 16.99 K/UL (ref 3.9–12.7)

## 2024-10-31 PROCEDURE — 80053 COMPREHEN METABOLIC PANEL: CPT | Performed by: NURSE PRACTITIONER

## 2024-10-31 PROCEDURE — S4991 NICOTINE PATCH NONLEGEND: HCPCS | Performed by: HOSPITALIST

## 2024-10-31 PROCEDURE — 96376 TX/PRO/DX INJ SAME DRUG ADON: CPT

## 2024-10-31 PROCEDURE — 27000221 HC OXYGEN, UP TO 24 HOURS

## 2024-10-31 PROCEDURE — 63600175 PHARM REV CODE 636 W HCPCS: Performed by: NURSE PRACTITIONER

## 2024-10-31 PROCEDURE — 94640 AIRWAY INHALATION TREATMENT: CPT | Mod: XB

## 2024-10-31 PROCEDURE — 63700000 PHARM REV CODE 250 ALT 637 W/O HCPCS: Performed by: HOSPITALIST

## 2024-10-31 PROCEDURE — 25000003 PHARM REV CODE 250: Performed by: NURSE PRACTITIONER

## 2024-10-31 PROCEDURE — 25000003 PHARM REV CODE 250: Performed by: HOSPITALIST

## 2024-10-31 PROCEDURE — 25000242 PHARM REV CODE 250 ALT 637 W/ HCPCS: Performed by: NURSE PRACTITIONER

## 2024-10-31 PROCEDURE — 85025 COMPLETE CBC W/AUTO DIFF WBC: CPT | Performed by: NURSE PRACTITIONER

## 2024-10-31 PROCEDURE — 36415 COLL VENOUS BLD VENIPUNCTURE: CPT | Performed by: NURSE PRACTITIONER

## 2024-10-31 PROCEDURE — G0378 HOSPITAL OBSERVATION PER HR: HCPCS

## 2024-10-31 PROCEDURE — 84100 ASSAY OF PHOSPHORUS: CPT | Performed by: NURSE PRACTITIONER

## 2024-10-31 PROCEDURE — 94761 N-INVAS EAR/PLS OXIMETRY MLT: CPT

## 2024-10-31 PROCEDURE — 83735 ASSAY OF MAGNESIUM: CPT | Performed by: NURSE PRACTITIONER

## 2024-10-31 RX ORDER — IBUPROFEN 200 MG
1 TABLET ORAL DAILY
Qty: 30 PATCH | Refills: 0 | Status: SHIPPED | OUTPATIENT
Start: 2024-10-31 | End: 2024-10-31

## 2024-10-31 RX ORDER — AMOXICILLIN AND CLAVULANATE POTASSIUM 875; 125 MG/1; MG/1
1 TABLET, FILM COATED ORAL EVERY 12 HOURS
Qty: 6 TABLET | Refills: 0 | Status: SHIPPED | OUTPATIENT
Start: 2024-10-31 | End: 2024-10-31

## 2024-10-31 RX ORDER — AZITHROMYCIN 500 MG/1
500 TABLET, FILM COATED ORAL DAILY
Qty: 3 TABLET | Refills: 0 | Status: SHIPPED | OUTPATIENT
Start: 2024-10-31 | End: 2024-11-03

## 2024-10-31 RX ORDER — AMOXICILLIN AND CLAVULANATE POTASSIUM 875; 125 MG/1; MG/1
1 TABLET, FILM COATED ORAL EVERY 12 HOURS
Qty: 6 TABLET | Refills: 0 | Status: SHIPPED | OUTPATIENT
Start: 2024-10-31 | End: 2024-11-03

## 2024-10-31 RX ORDER — IBUPROFEN 200 MG
1 TABLET ORAL DAILY
Qty: 30 PATCH | Refills: 0 | Status: SHIPPED | OUTPATIENT
Start: 2024-10-31

## 2024-10-31 RX ORDER — PREDNISONE 10 MG/1
TABLET ORAL
Qty: 30 TABLET | Refills: 0 | Status: SHIPPED | OUTPATIENT
Start: 2024-10-31 | End: 2024-10-31

## 2024-10-31 RX ORDER — AZITHROMYCIN 500 MG/1
500 TABLET, FILM COATED ORAL DAILY
Qty: 3 TABLET | Refills: 0 | Status: SHIPPED | OUTPATIENT
Start: 2024-10-31 | End: 2024-10-31

## 2024-10-31 RX ORDER — PREDNISONE 10 MG/1
TABLET ORAL
Qty: 30 TABLET | Refills: 0 | Status: SHIPPED | OUTPATIENT
Start: 2024-10-31 | End: 2024-11-12

## 2024-10-31 RX ADMIN — AZITHROMYCIN DIHYDRATE 500 MG: 250 TABLET ORAL at 09:10

## 2024-10-31 RX ADMIN — BENZONATATE 100 MG: 100 CAPSULE ORAL at 10:10

## 2024-10-31 RX ADMIN — IPRATROPIUM BROMIDE AND ALBUTEROL SULFATE 3 ML: .5; 3 SOLUTION RESPIRATORY (INHALATION) at 07:10

## 2024-10-31 RX ADMIN — METHYLPREDNISOLONE SODIUM SUCCINATE 80 MG: 40 INJECTION, POWDER, FOR SOLUTION INTRAMUSCULAR; INTRAVENOUS at 06:10

## 2024-10-31 RX ADMIN — IPRATROPIUM BROMIDE AND ALBUTEROL SULFATE 3 ML: .5; 3 SOLUTION RESPIRATORY (INHALATION) at 11:10

## 2024-10-31 RX ADMIN — CYCLOBENZAPRINE HYDROCHLORIDE 10 MG: 5 TABLET, FILM COATED ORAL at 08:10

## 2024-10-31 RX ADMIN — CEFTRIAXONE 1 G: 1 INJECTION, POWDER, FOR SOLUTION INTRAMUSCULAR; INTRAVENOUS at 06:10

## 2024-10-31 RX ADMIN — POTASSIUM & SODIUM PHOSPHATES POWDER PACK 280-160-250 MG 2 PACKET: 280-160-250 PACK at 11:10

## 2024-10-31 RX ADMIN — Medication 1 PATCH: at 09:10

## 2024-10-31 RX ADMIN — IPRATROPIUM BROMIDE AND ALBUTEROL SULFATE 3 ML: .5; 3 SOLUTION RESPIRATORY (INHALATION) at 03:10

## 2024-10-31 RX ADMIN — ACETAMINOPHEN 650 MG: 325 TABLET ORAL at 10:10

## 2024-10-31 RX ADMIN — POTASSIUM & SODIUM PHOSPHATES POWDER PACK 280-160-250 MG 2 PACKET: 280-160-250 PACK at 06:10

## 2024-10-31 RX ADMIN — ALPRAZOLAM 0.5 MG: 0.25 TABLET ORAL at 10:10

## 2024-10-31 NOTE — HOSPITAL COURSE
Patient was observed by the hospital medicine service.  She was treated with IV steroids for asthma exacerbation.  Continued on antibiotics to cover for potential pneumonia.  Patient had diffuse wheezing during her hospital stay but by the time of discharge reported that she felt like she had improved to the point where she was ready for discharge home.  She was discharged home on steroid taper.  She was instructed to follow up PCP.  Nicotine patch sent to pharmacy on discharge per patient request.  A course of antibiotic for empiric pneumonia coverage was sent to pharmacy on discharge.  Patient expressed understanding of discharge plan and was agreeable  to discharge home

## 2024-10-31 NOTE — NURSING
Pts  arrived. Reprinted new dc papers with correct Pharmacy. Tele [phone] removed and returned to Tele room. IV in right ac removed with cath intact. Gauze applied and secured with tape . No bleeding from site. Pt dcd via wheelchair and going home per her husbands

## 2024-10-31 NOTE — SUBJECTIVE & OBJECTIVE
Interval History:  Patient seen and examined.  Continues to have significant  wheezing and dry cough.  She still needs continued care in the hospital for IV steroids, nebulizers, and close monitoring.      Review of Systems   Constitutional:  Positive for activity change.   Respiratory:  Positive for cough, shortness of breath and wheezing.      Objective:     Vital Signs (Most Recent):  Temp: 97.9 °F (36.6 °C) (10/31/24 0800)  Pulse: 85 (10/31/24 0800)  Resp: 17 (10/31/24 0800)  BP: 131/74 (10/31/24 0800)  SpO2: 95 % (10/31/24 0809) Vital Signs (24h Range):  Temp:  [97.9 °F (36.6 °C)-98.1 °F (36.7 °C)] 97.9 °F (36.6 °C)  Pulse:  [82-98] 85  Resp:  [16-20] 17  SpO2:  [93 %-99 %] 95 %  BP: (116-137)/(67-77) 131/74     Weight: (!) 184.1 kg (405 lb 13.9 oz)  Body mass index is 71.9 kg/m².    Intake/Output Summary (Last 24 hours) at 10/31/2024 1009  Last data filed at 10/30/2024 1700  Gross per 24 hour   Intake 840 ml   Output 0 ml   Net 840 ml         Physical Exam  Constitutional:       General: She is not in acute distress.     Appearance: She is well-developed.   HENT:      Head: Normocephalic and atraumatic.   Eyes:      Pupils: Pupils are equal, round, and reactive to light.   Cardiovascular:      Rate and Rhythm: Normal rate and regular rhythm.      Heart sounds: No murmur heard.  Pulmonary:      Effort: Respiratory distress present.      Breath sounds: Wheezing (diffuse) present. No rales.   Abdominal:      General: Bowel sounds are normal. There is no distension.      Palpations: Abdomen is soft.      Tenderness: There is no abdominal tenderness.   Musculoskeletal:         General: Normal range of motion.   Skin:     General: Skin is warm and dry.      Findings: No rash.   Neurological:      Mental Status: She is alert and oriented to person, place, and time.      Cranial Nerves: No cranial nerve deficit.   Psychiatric:         Behavior: Behavior normal.             Significant Labs: All pertinent labs within  the past 24 hours have been reviewed.    Significant Imaging: I have reviewed all pertinent imaging results/findings within the past 24 hours.

## 2024-10-31 NOTE — PROGRESS NOTES
[facility] Medicine  Progress Note    Patient Name: Ifrah Esquivel  MRN: [id number]  Patient Class: OP- Observation   Admission Date: 10/29/2024  Length of Stay: 0 days  Attending Physician: Jade Sanchez MD  Primary Care Provider: Laura, Primary Doctor        Subjective:     Principal Problem:Acute hypoxemic respiratory failure        HPI:  Ifrah Esquivel is a 39 year female who presents emergency room complaining of nonproductive cough, wheezing, shortness for breath.  Patient was seen in the emergency room for same symptoms yesterday.  She was diagnosed with pneumonia, started on outpatient antibiotics and discharged home.  She returns today with worsening of symptoms.  She does endorse some subjective fevers but no measured temperatures.  Symptoms are worse with any exertion.  She reports history of asthma, active smoker, bipolar, and morbid obesity.  ER workup: CBC with leukocytosis of 19,000.  CMP with bicarb 16 glucose 123 lactic acid elevated 2.5.  Chest x-ray today was negative for any acute findings.  Patient was given DuoNebs, steroids, Rocephin and Zithromax.  Will admit to Hospital Medicine for treatment and management.    Overview/Hospital Course:  No notes on file    Interval History:  Patient seen and examined.  Continues to have significant  wheezing and dry cough.  She still needs continued care in the hospital for IV steroids, nebulizers, and close monitoring.      Review of Systems   Constitutional:  Positive for activity change.   Respiratory:  Positive for cough, shortness of breath and wheezing.      Objective:     Vital Signs (Most Recent):  Temp: 97.9 °F (36.6 °C) (10/31/24 0800)  Pulse: 85 (10/31/24 0800)  Resp: 17 (10/31/24 0800)  BP: 131/74 (10/31/24 0800)  SpO2: 95 % (10/31/24 0809) Vital Signs (24h Range):  Temp:  [97.9 °F (36.6 °C)-98.1 °F (36.7 °C)] 97.9 °F (36.6 °C)  Pulse:  [82-98] 85  Resp:  [16-20] 17  SpO2:  [93 %-99 %] 95 %  BP: (116-137)/(67-77) 131/74      Weight: (!) 184.1 kg (405 lb 13.9 oz)  Body mass index is 71.9 kg/m².    Intake/Output Summary (Last 24 hours) at 10/31/2024 1009  Last data filed at 10/30/2024 1700  Gross per 24 hour   Intake 840 ml   Output 0 ml   Net 840 ml         Physical Exam  Constitutional:       General: She is not in acute distress.     Appearance: She is well-developed.   HENT:      Head: Normocephalic and atraumatic.   Eyes:      Pupils: Pupils are equal, round, and reactive to light.   Cardiovascular:      Rate and Rhythm: Normal rate and regular rhythm.      Heart sounds: No murmur heard.  Pulmonary:      Effort: Respiratory distress present.      Breath sounds: Wheezing (diffuse) present. No rales.   Abdominal:      General: Bowel sounds are normal. There is no distension.      Palpations: Abdomen is soft.      Tenderness: There is no abdominal tenderness.   Musculoskeletal:         General: Normal range of motion.   Skin:     General: Skin is warm and dry.      Findings: No rash.   Neurological:      Mental Status: She is alert and oriented to person, place, and time.      Cranial Nerves: No cranial nerve deficit.   Psychiatric:         Behavior: Behavior normal.             Significant Labs: All pertinent labs within the past 24 hours have been reviewed.    Significant Imaging: I have reviewed all pertinent imaging results/findings within the past 24 hours.    Assessment/Plan:      * Acute hypoxemic respiratory failure  Patient with Hypoxic Respiratory failure which is Acute.  she is not on home oxygen. Supplemental oxygen was provided and noted-      .   Signs/symptoms of respiratory failure include- tachypnea, increased work of breathing, respiratory distress, use of accessory muscles, and wheezing. Contributing diagnoses includes - COPD and Pneumonia Labs and images were reviewed. Patient Has not had a recent ABG. Will treat underlying causes and adjust management of respiratory failure as follows- oxygen, DuoNebs,  antibiotics, IV steroids    Dependence on nicotine from cigarettes  Dangers of cigarette smoking were reviewed with patient in detail. Patient was Counseled for 3-10 minutes. Nicotine replacement options were discussed. Nicotine replacement was discussed- not prescribed per patient's request    Essential hypertension  Patients blood pressure range in the last 24 hours was: BP  Min: 99/60  Max: 148/92.The patient's inpatient anti-hypertensive regimen is listed below:  Current Antihypertensives       Plan  - BP is controlled, no changes needed to their regimen  -     Morbid obesity  Body mass index is 53.14 kg/m². Morbid obesity complicates all aspects of disease management from diagnostic modalities to treatment. Weight loss encouraged and health benefits explained to patient.         Bipolar disorder  Chronic problem   Stable         Tobacco smoker within last 12 months  Chronic problem  Dangers of cigarette smoking were reviewed with patient in detail for 10 minutes and patient was encouraged to quit. Nicotine replacement options were discussed. Nicotine replacement options were discussed. Nicotine replacement was not prescribed per patient request.       Moderate persistent asthma with exacerbation  Acute problem   DuoNebs q.4   Solu-Medrol Q 8   Oxygen p.r.n.   Zithromax   Rocephin        VTE Risk Mitigation (From admission, onward)           Ordered     Place LATRICIA hose  Until discontinued         10/29/24 1544     IP VTE HIGH RISK PATIENT  Once         10/29/24 1544     Place sequential compression device  Until discontinued         10/29/24 1544                    Discharge Planning   NATALIE: 11/1/2024     Code Status: Full Code   Is the patient medically ready for discharge?:     Reason for patient still in hospital (select all that apply): Patient trending condition and Treatment  Discharge Plan A: Home with family                  Jade Sanchez MD  Department of Hospital Medicine   [facility]  Med/Surg

## 2024-10-31 NOTE — PLAN OF CARE
Notified by nurse patient states she feels better now and is requesting discharge home. Will place discharge orders

## 2024-10-31 NOTE — PLAN OF CARE
Problem: Adult Inpatient Plan of Care  Goal: Plan of Care Review  Outcome: Met  Goal: Patient-Specific Goal (Individualized)  Outcome: Met  Goal: Absence of Hospital-Acquired Illness or Injury  Outcome: Met  Goal: Optimal Comfort and Wellbeing  Outcome: Met  Goal: Readiness for Transition of Care  Outcome: Met     Problem: Bariatric Environmental Safety  Goal: Safety Maintained with Care  Outcome: Met     Problem: Infection  Goal: Absence of Infection Signs and Symptoms  Outcome: Met     Problem: Wound  Goal: Optimal Coping  Outcome: Met  Goal: Optimal Functional Ability  Outcome: Met  Goal: Absence of Infection Signs and Symptoms  Outcome: Met  Goal: Improved Oral Intake  Outcome: Met  Goal: Optimal Pain Control and Function  Outcome: Met  Goal: Skin Health and Integrity  Outcome: Met  Goal: Optimal Wound Healing  Outcome: Met

## 2024-10-31 NOTE — NURSING
Pts  arrived . Pt getting dressed. Pt states I use [facility] on [address] for my meds. SN notified Cm and Dr. Sanchez who states when changed will resend to correct pharmacy.

## 2024-10-31 NOTE — DISCHARGE SUMMARY
[facility] Medicine  Discharge Summary      Patient Name: Ifrah Esquivel  MRN: [id number]  GEO: [id number]  Patient Class: OP- Observation  Admission Date: 10/29/2024  Hospital Length of Stay: 0 days  Discharge Date and Time: 10/31/2024 12:51 PM  Attending Physician: Laura att. providers found   Discharging Provider: Jade Sanchez MD  Primary Care Provider: Laura, Primary Doctor    Primary Care Team: Networked reference to record PCT     HPI:   Ifrah Esquivel is a 39 year female who presents emergency room complaining of nonproductive cough, wheezing, shortness for breath.  Patient was seen in the emergency room for same symptoms yesterday.  She was diagnosed with pneumonia, started on outpatient antibiotics and discharged home.  She returns today with worsening of symptoms.  She does endorse some subjective fevers but no measured temperatures.  Symptoms are worse with any exertion.  She reports history of asthma, active smoker, bipolar, and morbid obesity.  ER workup: CBC with leukocytosis of 19,000.  CMP with bicarb 16 glucose 123 lactic acid elevated 2.5.  Chest x-ray today was negative for any acute findings.  Patient was given DuoNebs, steroids, Rocephin and Zithromax.  Will admit to Hospital Medicine for treatment and management.    * No surgery found *      Hospital Course:   Patient was observed by the hospital medicine service.  She was treated with IV steroids for asthma exacerbation.  Continued on antibiotics to cover for potential pneumonia.  Patient had diffuse wheezing during her hospital stay but by the time of discharge reported that she felt like she had improved to the point where she was ready for discharge home.  She was discharged home on steroid taper.  She was instructed to follow up PCP.  Nicotine patch sent to pharmacy on discharge per patient request.  A course of antibiotic for empiric pneumonia coverage was sent to pharmacy on discharge.  Patient expressed  understanding of discharge plan and was agreeable  to discharge home     Goals of Care Treatment Preferences:  Code Status: Full Code      SDOH Screening:  The patient was screened for utility difficulties, food insecurity, transport difficulties, housing insecurity, and interpersonal safety and there were no concerns identified this admission.     Consults:     No new Assessment & Plan notes have been filed under this hospital service since the last note was generated.  Service: Hospital Medicine    Final Active Diagnoses:    Diagnosis Date Noted POA    PRINCIPAL PROBLEM:  Acute hypoxemic respiratory failure [J96.01] 08/20/2020 Yes    Dependence on nicotine from cigarettes [F17.210] 10/29/2024 Yes    Morbid obesity [E66.01] 08/20/2020 Yes    Moderate persistent asthma with exacerbation [J45.41] 08/20/2020 Yes    Essential hypertension [I10] 08/20/2020 Yes    Bipolar disorder [F31.9] 08/20/2020 Yes    Tobacco smoker within last 12 months [F17.200] 08/20/2020 Yes      Problems Resolved During this Admission:       Discharged Condition: good    Disposition: Home or Self Care    Follow Up:   Follow-up Information       [facility] Schedule an appointment as soon as possible for a visit.    Why: office to contact patient to schedule hospital follow up  Contact information:  [address]  [facility] [address]  [phone]                           Patient Instructions:      Notify your health care provider if you experience any of the following:  persistent nausea and vomiting or diarrhea     Notify your health care provider if you experience any of the following:  temperature >100.4     Notify your health care provider if you experience any of the following:  difficulty breathing or increased cough     Activity as tolerated       Significant Diagnostic Studies: Labs: BMP:   Recent Labs   Lab 10/30/24  0304 10/31/24  0414   * 159*    138   K 4.0 4.4   * 108    CO2 19* 23   BUN 19 20   CREATININE 0.9 1.1   CALCIUM 8.9 9.1   MG 2.0 2.1    and CBC   Recent Labs   Lab 10/30/24  0304 10/31/24  0414   WBC 15.77* 16.99*   HGB 12.7 12.1   HCT 38.6 36.9*    342     Radiology Results (last 7 days)    Procedure Component Value Units Date/Time   X-Ray Chest PA And Lateral [[id number]] Resulted: 10/29/24 1413   Order Status: Completed Updated: 10/29/24 1415   Narrative:     EXAMINATION:  XR CHEST PA AND LATERAL    CLINICAL HISTORY:  Cough, unspecified    TECHNIQUE:  PA and lateral views of the chest were performed.    COMPARISON:  10/28/2024 and also earlier study 06/24/2024    FINDINGS:  The heart is not enlarged.  The lungs are clear of infiltrate.  No pleural effusion.   Impression:       No acute cardiopulmonary disease      Electronically signed by: Barbra Pinedo MD  Date: 10/29/2024  Time: 14:13     Pending Diagnostic Studies:       None           Medications:  Reconciled Home Medications:      Medication List        START taking these medications      amoxicillin-clavulanate 875-125mg 875-125 mg per tablet  Commonly known as: AUGMENTIN  Take 1 tablet by mouth every 12 (twelve) hours. for 3 days     azithromycin 500 MG tablet  Commonly known as: ZITHROMAX  Take 1 tablet (500 mg total) by mouth once daily. for 3 days     nicotine 21 mg/24 hr  Commonly known as: NICODERM CQ  Place 1 patch onto the skin once daily.     predniSONE 10 MG tablet  Commonly known as: DELTASONE  Take 4 tablets (40 mg total) by mouth once daily for 3 days, THEN 3 tablets (30 mg total) once daily for 3 days, THEN 2 tablets (20 mg total) once daily for 3 days, THEN 1 tablet (10 mg total) once daily for 3 days.  Start taking on: October 31, 2024            CONTINUE taking these medications      * albuterol 2.5 mg /3 mL (0.083 %) nebulizer solution  Commonly known as: PROVENTIL  Take 3 mLs (2.5 mg total) by nebulization every 6 (six) hours as needed for Wheezing or Shortness of Breath. Rescue     *  albuterol 90 mcg/actuation inhaler  Commonly known as: PROVENTIL/VENTOLIN HFA  Inhale 1-2 puffs into the lungs every 6 (six) hours as needed for Wheezing or Shortness of Breath. Rescue     benzonatate 100 MG capsule  Commonly known as: TESSALON  Take 1 capsule (100 mg total) by mouth 3 (three) times daily as needed for Cough.     benzphetamine 50 mg Tab  Take 1 tablet by mouth 3 (three) times daily. NEW Rx - NOT YET STARTED     cetirizine 10 MG tablet  Commonly known as: ZYRTEC  Take 1 tablet (10 mg total) by mouth daily as needed for Allergies or Rhinitis.     CombiVENT RESPIMAT  mcg/actuation inhaler  Generic drug: ipratropium-albuteroL  Inhale 1 puff into the lungs every 4 (four) hours as needed for Shortness of Breath.     cyclobenzaprine 10 MG tablet  Commonly known as: FLEXERIL  Take 10 mg by mouth 3 (three) times daily as needed for Muscle spasms.     fluticasone propionate 50 mcg/actuation nasal spray  Commonly known as: FLONASE  1 spray (50 mcg total) by Each Nostril route 2 (two) times daily as needed for Rhinitis or Allergies.     gabapentin 300 MG capsule  Commonly known as: NEURONTIN  Take 600 mg by mouth 2 (two) times daily.     lamoTRIgine 25 MG tablet  Commonly known as: LAMICTAL  Take 25 mg by mouth once daily.     mupirocin 2 % ointment  Commonly known as: BACTROBAN  Apply topically 2 (two) times daily.     ofloxacin 0.3 % otic solution  Commonly known as: FLOXIN  Place 3 drops into the right ear 2 (two) times daily. for 7 days     phentermine 37.5 mg tablet  Commonly known as: ADIPEX-P  Take 18.75 mg by mouth 2 (two) times daily.     promethazine-dextromethorphan 6.25-15 mg/5 mL Syrp  Commonly known as: PROMETHAZINE-DM  Take 5 mLs by mouth every 4 (four) hours as needed (Cough).     QUEtiapine 300 MG Tab  Commonly known as: SEROQUEL  Take 300 mg by mouth every evening.     sumatriptan 25 MG Tab  Commonly known as: IMITREX  Take 25 mg by mouth as needed.           * This list has 2  medication(s) that are the same as other medications prescribed for you. Read the directions carefully, and ask your doctor or other care provider to review them with you.                STOP taking these medications      amoxicillin 500 MG Tab  Commonly known as: AMOXIL              Indwelling Lines/Drains at time of discharge:   Lines/Drains/Airways       None                   Time spent on the discharge of patient: 35 minutes         Jade Sanchez MD  Department of Hospital Medicine  [facility]/Surg

## 2024-10-31 NOTE — CARE UPDATE
10/30/24 1920   Patient Assessment/Suction   Level of Consciousness (AVPU) alert   Respiratory Effort Normal   Expansion/Accessory Muscles/Retractions expansion symmetric   KEVIN Breath Sounds wheezes, expiratory   LLL Breath Sounds wheezes, expiratory   RUL Breath Sounds wheezes, expiratory   RML Breath Sounds wheezes, expiratory   RLL Breath Sounds wheezes, expiratory   Rhythm/Pattern, Respiratory pattern regular   Cough Frequency infrequent   Cough Type good;loose;nonproductive   PRE-TX-O2   Device (Oxygen Therapy) nasal cannula   $ Is the patient on Low Flow Oxygen? Yes   Flow (L/min) (Oxygen Therapy) 2   SpO2 98 %   Pulse Oximetry Type Intermittent   $ Pulse Oximetry - Multiple Charge Pulse Oximetry - Multiple   Pulse 85   Resp 18   Aerosol Therapy   $ Aerosol Therapy Charges Aerosol Treatment   Daily Review of Necessity (SVN) completed   Respiratory Treatment Status (SVN) given   Treatment Route (SVN) mask   Patient Position Quintanilla's   Signs of Intolerance (SVN) none   Breath Sounds Post-Respiratory Treatment   Throughout All Fields Post-Treatment aeration increased;wheezes, expiratory   Post-treatment Heart Rate (beats/min) 89   Post-treatment Resp Rate (breaths/min) 18

## 2024-10-31 NOTE — PLAN OF CARE
Patient cleared for discharge from case management standpoint.    SW sent email to [facility] to contact patient to schedule hospital follow up.       10/31/24 1055   Final Note   Assessment Type Final Discharge Note   Anticipated Discharge Disposition Home   Hospital Resources/Appts/Education Provided Appointment suggestion unavailable;Provided patient/caregiver with written discharge plan information;Provided education on problems/symptoms using teachback   Post-Acute Status   Post-Acute Authorization Other   Other Status No Post-Acute Service Needs   Discharge Delays None known at this time

## 2024-10-31 NOTE — PLAN OF CARE
POC reviewed with pt, verbalized understanding. Pt AAO x 4, able to make needs known. Continuous cardiac monitoring in place. Meds given per MAR. IV intact and patent. Purposeful q2hr rounding done. Safety maintained with side rails up x3, bed wheels locked, bed in lowest position, and call light in reach. Pt educated to call for assistance with ambulation, verbalized understanding. Pt remains free of falls. No further needs expressed at this time. Will continue to monitor.

## 2024-10-31 NOTE — CARE UPDATE
10/31/24 0730   Patient Assessment/Suction   Level of Consciousness (AVPU) alert   Respiratory Effort Unlabored   Expansion/Accessory Muscles/Retractions no use of accessory muscles;expansion symmetric   All Lung Fields Breath Sounds wheezes, expiratory   Rhythm/Pattern, Respiratory no shortness of breath reported;depth regular;pattern regular   Cough Frequency infrequent   PRE-TX-O2   Device (Oxygen Therapy) nasal cannula   $ Is the patient on Low Flow Oxygen? Yes   Flow (L/min) (Oxygen Therapy) 2   SpO2 96 %   Pulse Oximetry Type Intermittent   $ Pulse Oximetry - Multiple Charge Pulse Oximetry - Multiple   Pulse 85   Resp 18   Aerosol Therapy   $ Aerosol Therapy Charges Aerosol Treatment   Respiratory Treatment Status (SVN) given   Treatment Route (SVN) mask;oxygen   Patient Position semi-Quintanilla's   Signs of Intolerance (SVN) none   Breath Sounds Post-Respiratory Treatment   Throughout All Fields Post-Treatment All Fields   Throughout All Fields Post-Treatment aeration increased   Post-treatment Heart Rate (beats/min) 85   Post-treatment Resp Rate (breaths/min) 18

## 2024-11-03 LAB
BACTERIA BLD CULT: NORMAL
BACTERIA BLD CULT: NORMAL

## 2024-11-04 LAB
OHS QRS DURATION: 92 MS
OHS QRS DURATION: 92 MS
OHS QTC CALCULATION: 442 MS
OHS QTC CALCULATION: 450 MS

## 2024-12-15 ENCOUNTER — HOSPITAL ENCOUNTER (EMERGENCY)
Facility: HOSPITAL | Age: 39
Discharge: HOME | End: 2024-12-15
Attending: EMERGENCY MEDICINE
Payer: COMMERCIAL

## 2024-12-15 VITALS
BODY MASS INDEX: 51.91 KG/M2 | HEART RATE: 81 BPM | HEIGHT: 63 IN | OXYGEN SATURATION: 100 % | TEMPERATURE: 98 F | DIASTOLIC BLOOD PRESSURE: 92 MMHG | SYSTOLIC BLOOD PRESSURE: 142 MMHG | RESPIRATION RATE: 19 BRPM | WEIGHT: 293 LBS

## 2024-12-15 DIAGNOSIS — J45.41 MODERATE PERSISTENT ASTHMA WITH EXACERBATION: Primary | ICD-10-CM

## 2024-12-15 LAB
INFLUENZA A, MOLECULAR: NEGATIVE
INFLUENZA B, MOLECULAR: NEGATIVE
SARS-COV-2 RDRP RESP QL NAA+PROBE: NEGATIVE
SPECIMEN SOURCE: NORMAL

## 2024-12-15 PROCEDURE — 87502 INFLUENZA DNA AMP PROBE: CPT

## 2024-12-15 PROCEDURE — 87635 SARS-COV-2 COVID-19 AMP PRB: CPT

## 2024-12-15 PROCEDURE — 25000242 PHARM REV CODE 250 ALT 637 W/ HCPCS

## 2024-12-15 PROCEDURE — 94640 AIRWAY INHALATION TREATMENT: CPT

## 2024-12-15 PROCEDURE — 94644 CONT INHLJ TX 1ST HOUR: CPT

## 2024-12-15 PROCEDURE — 63600175 PHARM REV CODE 636 W HCPCS

## 2024-12-15 PROCEDURE — 94761 N-INVAS EAR/PLS OXIMETRY MLT: CPT

## 2024-12-15 PROCEDURE — 99284 EMERGENCY DEPT VISIT MOD MDM: CPT | Mod: 25

## 2024-12-15 PROCEDURE — 99900035 HC TECH TIME PER 15 MIN (STAT)

## 2024-12-15 PROCEDURE — 99900031 HC PATIENT EDUCATION (STAT)

## 2024-12-15 RX ORDER — PREDNISONE 20 MG/1
60 TABLET ORAL
Status: COMPLETED | OUTPATIENT
Start: 2024-12-15 | End: 2024-12-15

## 2024-12-15 RX ORDER — PREDNISONE 20 MG/1
40 TABLET ORAL DAILY
Qty: 6 TABLET | Refills: 0 | Status: SHIPPED | OUTPATIENT
Start: 2024-12-15 | End: 2024-12-18

## 2024-12-15 RX ORDER — IPRATROPIUM BROMIDE AND ALBUTEROL SULFATE 2.5; .5 MG/3ML; MG/3ML
3 SOLUTION RESPIRATORY (INHALATION) ONCE
Status: DISCONTINUED | OUTPATIENT
Start: 2024-12-15 | End: 2024-12-16 | Stop reason: HOSPADM

## 2024-12-15 RX ORDER — ALBUTEROL SULFATE 90 UG/1
2 INHALANT RESPIRATORY (INHALATION) EVERY 6 HOURS PRN
Qty: 36 G | Refills: 0 | Status: ON HOLD | OUTPATIENT
Start: 2024-12-15 | End: 2025-01-14

## 2024-12-15 RX ORDER — ALBUTEROL SULFATE 0.83 MG/ML
2.5 SOLUTION RESPIRATORY (INHALATION) EVERY 6 HOURS PRN
Qty: 20 EACH | Refills: 0 | Status: ON HOLD | OUTPATIENT
Start: 2024-12-15

## 2024-12-15 RX ORDER — IPRATROPIUM BROMIDE AND ALBUTEROL SULFATE 2.5; .5 MG/3ML; MG/3ML
3 SOLUTION RESPIRATORY (INHALATION) ONCE
Status: COMPLETED | OUTPATIENT
Start: 2024-12-15 | End: 2024-12-15

## 2024-12-15 RX ADMIN — PREDNISONE 60 MG: 20 TABLET ORAL at 10:12

## 2024-12-15 RX ADMIN — IPRATROPIUM BROMIDE AND ALBUTEROL SULFATE 6 ML: .5; 3 SOLUTION RESPIRATORY (INHALATION) at 10:12

## 2024-12-16 NOTE — DISCHARGE INSTRUCTIONS
Please use medication as prescribed.  If you develop shortness of breath and continued wheezing please return to the emergency department.

## 2024-12-16 NOTE — ED PROVIDER NOTES
Encounter Date: 12/15/2024       History     Chief Complaint   Patient presents with    Asthma     Patient c/o asthma attack. She states that she does not have a rescue inhaler due to insurance issues     39 year old female PMH of asthma presents for asthma exacerbation. Reports she uses her rescue inhaler at least once a day. She was at a football game tonight when she dropped her purse and lost her inhaler. She had a long walk to the car and began to feel short of breath and wheezy.  Denies fever, seen, recent illness.  She follows up with her primary care provider for her asthma.  She was unable to  her most recent prescription for the inhaler because it needed a prior authorization.  She has never been hospitalized previously for her asthma.        Review of patient's allergies indicates:   Allergen Reactions    Asa [aspirin]     Aspirin Other (See Comments)     avoids due to asthma    Ultram [tramadol]     Ultram [tramadol] Nausea And Vomiting    Zofran [ondansetron hcl (pf)]     Zofran [ondansetron hcl (pf)] Other (See Comments)     Gives migraines     Past Medical History:   Diagnosis Date    Anxiety     Asthma     Bipolar affective disorder, current episode manic with psychotic symptoms     Hypertension     Manic bipolar I disorder     Mental health disorder     PTSD (post-traumatic stress disorder)     Substance abuse      Past Surgical History:   Procedure Laterality Date    APPENDECTOMY      c sections      x 3    CHOLECYSTECTOMY      ESOPHAGOGASTRODUODENOSCOPY N/A 9/15/2022    Procedure: EGD (ESOPHAGOGASTRODUODENOSCOPY);  Surgeon: Law Sheriff MD;  Location: [facility];  Service: Endoscopy;  Laterality: N/A;    HYSTERECTOMY      TONSILLECTOMY      tonsils and adenoidectomy       Family History   Problem Relation Name Age of Onset    Ovarian cancer Mother      Cancer Other m great gm         breast    No Known Problems Father      Diabetes Paternal Grandmother       Social History     Tobacco  Use    Smoking status: Every Day     Current packs/day: 0.50     Types: Cigarettes    Smokeless tobacco: Never   Substance Use Topics    Alcohol use: Never    Drug use: Yes     Types: Marijuana     Comment: daily     Review of Systems   Constitutional: Negative.    HENT: Negative.     Eyes: Negative.    Respiratory:  Positive for chest tightness and shortness of breath.    Cardiovascular: Negative.    Gastrointestinal: Negative.    Genitourinary: Negative.    Musculoskeletal: Negative.    Skin: Negative.    Neurological: Negative.        Physical Exam     Initial Vitals [12/15/24 2155]   BP Pulse Resp Temp SpO2   (!) 142/92 82 18 98.4 °F (36.9 °C) 97 %      MAP       --         Physical Exam    Vitals reviewed.  Constitutional: She appears well-developed and well-nourished. She is not diaphoretic. No distress.   Eyes: Conjunctivae and EOM are normal.   Neck:   Normal range of motion.  Cardiovascular:  Normal rate, regular rhythm, normal heart sounds and intact distal pulses.           Pulmonary/Chest: No respiratory distress. She has wheezes (scattered throughout all lung fields). She exhibits no tenderness.   Increased work of breathing   Abdominal: She exhibits no distension. There is no abdominal tenderness. There is no rebound and no guarding.   Musculoskeletal:         General: No tenderness or edema. Normal range of motion.      Cervical back: Normal range of motion.     Neurological: She is alert. GCS score is 15. GCS eye subscore is 4. GCS verbal subscore is 5. GCS motor subscore is 6.   Skin: Skin is warm. Capillary refill takes less than 2 seconds.         ED Course   Procedures  Labs Reviewed   SARS-COV-2 RNA AMPLIFICATION, QUAL       Result Value    SARS-CoV-2 RNA, Amplification, Qual Negative     INFLUENZA A AND B ANTIGEN    Influenza A, Molecular Negative      Influenza B, Molecular Negative      Flu A & B Source Nasal swab      Narrative:     Specimen Source->Nasopharyngeal Swab   POCT URINE PREGNANCY           Imaging Results              X-Ray Chest PA And Lateral (Final result)  Result time 12/15/24 22:45:55      Final result by Favio Moreno MD (12/15/24 22:45:55)                   Impression:      No radiographic evidence of acute intrathoracic process.      Electronically signed by: Favio Moreno MD  Date:    12/15/2024  Time:    22:45               Narrative:    EXAMINATION:  XR CHEST PA AND LATERAL    CLINICAL HISTORY:  Asthma;    TECHNIQUE:  PA and lateral views of the chest were performed.    COMPARISON:  10/29/2024    FINDINGS:  The cardiomediastinal silhouette appears within normal limits.  Lungs are symmetrically expanded with mild coarse interstitial attenuation, similar to prior examinations.  No large confluent airspace consolidation identified.  No significant volume of pleural fluid or pneumothorax appreciated.  Visualized osseous structures appear intact.                                       Medications   albuterol-ipratropium 2.5 mg-0.5 mg/3 mL nebulizer solution 3 mL (3 mLs Nebulization Not Given 12/15/24 2330)   albuterol-ipratropium 2.5 mg-0.5 mg/3 mL nebulizer solution 3 mL (6 mLs Nebulization Given 12/15/24 2218)   predniSONE tablet 60 mg (60 mg Oral Given 12/15/24 2226)     Medical Decision Making  39 year old female PMH of asthma presents for asthma exacerbation. Reports she uses her rescue inhaler at least once a day. She was at a football game tonight when she dropped her purse and lost her inhaler. She had a long walk to the car and began to feel short of breath and wheezy.  Denies fever, seen, recent illness.  She follows up with her primary care provider for her asthma.  She was unable to  her most recent prescription for the inhaler because it needed a prior authorization.  She has never been hospitalized previously for her asthma.    Considerations include but not limited to asthma exacerbation, COVID, influenza    Vitals stable.  Patient afebrile.  O2 saturation  on arrival at 91%.  There are scattered wheezes throughout all lung fields Patient has increased work of breathing.  She was given 2 DuoNebs as well as prednisone.  Upon re-evaluation I did not appreciate any wheezes in bilateral lungs.  No increased work of breathing or retractions.  O2 saturation at 100%.  Patient reports she feels much better.  Patient will be discharged with steroids as well as inhaler.  She was given strict return precautions.  She verbalized her understanding of the plan and agreed.  Plan also discussed with my attending and all questions were answered at the bedside.    Amount and/or Complexity of Data Reviewed  Labs: ordered.  Radiology: ordered.    Risk  Prescription drug management.                                      Clinical Impression:  Final diagnoses:  [J45.41] Moderate persistent asthma with exacerbation (Primary)          ED Disposition Condition    Discharge Stable          ED Prescriptions       Medication Sig Dispense Start Date End Date Auth. Provider    albuterol (VENTOLIN HFA) 90 mcg/actuation inhaler Inhale 2 puffs into the lungs every 6 (six) hours as needed for Wheezing. Rescue 36 g 12/15/2024 1/14/2025 Asha Camejo PA-C    predniSONE (DELTASONE) 20 MG tablet Take 2 tablets (40 mg total) by mouth once daily. for 3 days 6 tablet 12/15/2024 12/18/2024 Asha Camejo PA-C    albuterol (PROVENTIL) 2.5 mg /3 mL (0.083 %) nebulizer solution Take 3 mLs (2.5 mg total) by nebulization every 6 (six) hours as needed for Wheezing or Shortness of Breath. Rescue 20 each 12/15/2024 -- Asha Camejo PA-C          Follow-up Information       Follow up With Specialties Details Why Contact Info    [facility]  Call   [address] [facility]  [place] [address]  [phone]               Asha Camejo PA-C  12/15/24 6206

## 2024-12-28 ENCOUNTER — HOSPITAL ENCOUNTER (INPATIENT)
Facility: HOSPITAL | Age: 39
LOS: 4 days | Discharge: HOME | DRG: 202 | End: 2025-01-02
Attending: EMERGENCY MEDICINE
Payer: COMMERCIAL

## 2024-12-28 DIAGNOSIS — R07.9 CHEST PAIN: ICD-10-CM

## 2024-12-28 DIAGNOSIS — F31.9 BIPOLAR AFFECTIVE DISORDER, REMISSION STATUS UNSPECIFIED: ICD-10-CM

## 2024-12-28 DIAGNOSIS — J45.901 ASTHMA EXACERBATION: ICD-10-CM

## 2024-12-28 DIAGNOSIS — R91.8 GROUND GLASS OPACITY PRESENT ON IMAGING OF LUNG: ICD-10-CM

## 2024-12-28 DIAGNOSIS — F17.218 CIGARETTE NICOTINE DEPENDENCE WITH OTHER NICOTINE-INDUCED DISORDER: ICD-10-CM

## 2024-12-28 DIAGNOSIS — F17.200 TOBACCO SMOKER WITHIN LAST 12 MONTHS: ICD-10-CM

## 2024-12-28 DIAGNOSIS — J96.01 ACUTE HYPOXIC RESPIRATORY FAILURE: ICD-10-CM

## 2024-12-28 DIAGNOSIS — M79.89 SWELLING OF LOWER EXTREMITY: ICD-10-CM

## 2024-12-28 DIAGNOSIS — R06.02 SHORTNESS OF BREATH: ICD-10-CM

## 2024-12-28 DIAGNOSIS — J45.41 MODERATE PERSISTENT ASTHMA WITH EXACERBATION: Primary | ICD-10-CM

## 2024-12-28 DIAGNOSIS — E66.01 MORBID OBESITY: ICD-10-CM

## 2024-12-28 LAB
ADENOVIRUS: NOT DETECTED
ALBUMIN SERPL BCP-MCNC: 3.8 G/DL (ref 3.5–5.2)
ALLENS TEST: ABNORMAL
ALP SERPL-CCNC: 73 U/L (ref 55–135)
ALT SERPL W/O P-5'-P-CCNC: 18 U/L (ref 10–44)
ANION GAP SERPL CALC-SCNC: 8 MMOL/L (ref 8–16)
AST SERPL-CCNC: 14 U/L (ref 10–40)
BASOPHILS # BLD AUTO: 0.04 K/UL (ref 0–0.2)
BASOPHILS NFR BLD: 0.4 % (ref 0–1.9)
BILIRUB SERPL-MCNC: 0.4 MG/DL (ref 0.1–1)
BNP SERPL-MCNC: 13 PG/ML (ref 0–99)
BORDETELLA PARAPERTUSSIS (IS1001): NOT DETECTED
BORDETELLA PERTUSSIS (PTXP): NOT DETECTED
BUN SERPL-MCNC: 12 MG/DL (ref 6–20)
CALCIUM SERPL-MCNC: 8.8 MG/DL (ref 8.7–10.5)
CHLAMYDIA PNEUMONIAE: NOT DETECTED
CHLORIDE SERPL-SCNC: 106 MMOL/L (ref 95–110)
CO2 SERPL-SCNC: 23 MMOL/L (ref 23–29)
CORONAVIRUS 229E, COMMON COLD VIRUS: NOT DETECTED
CORONAVIRUS HKU1, COMMON COLD VIRUS: NOT DETECTED
CORONAVIRUS NL63, COMMON COLD VIRUS: NOT DETECTED
CORONAVIRUS OC43, COMMON COLD VIRUS: NOT DETECTED
CREAT SERPL-MCNC: 0.8 MG/DL (ref 0.5–1.4)
DELSYS: ABNORMAL
DIFFERENTIAL METHOD BLD: NORMAL
EOSINOPHIL # BLD AUTO: 0.1 K/UL (ref 0–0.5)
EOSINOPHIL NFR BLD: 0.9 % (ref 0–8)
EP: 5
ERYTHROCYTE [DISTWIDTH] IN BLOOD BY AUTOMATED COUNT: 14.4 % (ref 11.5–14.5)
ERYTHROCYTE [SEDIMENTATION RATE] IN BLOOD BY WESTERGREN METHOD: 16 MM/H
EST. GFR  (NO RACE VARIABLE): >60 ML/MIN/1.73 M^2
FIO2: 28
FLUBV RNA NPH QL NAA+NON-PROBE: NOT DETECTED
GLUCOSE SERPL-MCNC: 93 MG/DL (ref 70–110)
HCO3 UR-SCNC: 22 MMOL/L (ref 24–28)
HCT VFR BLD AUTO: 39.7 % (ref 37–48.5)
HGB BLD-MCNC: 13 G/DL (ref 12–16)
HPIV1 RNA NPH QL NAA+NON-PROBE: NOT DETECTED
HPIV2 RNA NPH QL NAA+NON-PROBE: NOT DETECTED
HPIV3 RNA NPH QL NAA+NON-PROBE: NOT DETECTED
HPIV4 RNA NPH QL NAA+NON-PROBE: NOT DETECTED
HUMAN METAPNEUMOVIRUS: NOT DETECTED
IMM GRANULOCYTES # BLD AUTO: 0.04 K/UL (ref 0–0.04)
IMM GRANULOCYTES NFR BLD AUTO: 0.4 % (ref 0–0.5)
INFLUENZA A (SUBTYPES H1,H1-2009,H3): NOT DETECTED
INFLUENZA A, MOLECULAR: NEGATIVE
INFLUENZA B, MOLECULAR: NEGATIVE
IP: 10
LYMPHOCYTES # BLD AUTO: 4.5 K/UL (ref 1–4.8)
LYMPHOCYTES NFR BLD: 42.4 % (ref 18–48)
MAGNESIUM SERPL-MCNC: 1.9 MG/DL (ref 1.6–2.6)
MCH RBC QN AUTO: 29.7 PG (ref 27–31)
MCHC RBC AUTO-ENTMCNC: 32.7 G/DL (ref 32–36)
MCV RBC AUTO: 91 FL (ref 82–98)
MIN VOL: 29
MODE: ABNORMAL
MONOCYTES # BLD AUTO: 0.8 K/UL (ref 0.3–1)
MONOCYTES NFR BLD: 7.7 % (ref 4–15)
MYCOPLASMA PNEUMONIAE: NOT DETECTED
NEUTROPHILS # BLD AUTO: 5.1 K/UL (ref 1.8–7.7)
NEUTROPHILS NFR BLD: 48.2 % (ref 38–73)
NRBC BLD-RTO: 0 /100 WBC
PCO2 BLDA: 26.9 MMHG (ref 35–45)
PH SMN: 7.52 [PH] (ref 7.35–7.45)
PLATELET # BLD AUTO: 329 K/UL (ref 150–450)
PMV BLD AUTO: 9.4 FL (ref 9.2–12.9)
PO2 BLDA: 146 MMHG (ref 80–100)
POC BE: -1 MMOL/L
POC SATURATED O2: 100 % (ref 95–100)
POC TCO2: 23 MMOL/L (ref 23–27)
POTASSIUM SERPL-SCNC: 3.8 MMOL/L (ref 3.5–5.1)
PROT SERPL-MCNC: 6.5 G/DL (ref 6–8.4)
RBC # BLD AUTO: 4.37 M/UL (ref 4–5.4)
RESPIRATORY INFECTION PANEL SOURCE: NORMAL
RSV RNA NPH QL NAA+NON-PROBE: NOT DETECTED
RV+EV RNA NPH QL NAA+NON-PROBE: NOT DETECTED
SAMPLE: ABNORMAL
SARS-COV-2 RDRP RESP QL NAA+PROBE: NEGATIVE
SARS-COV-2 RNA RESP QL NAA+PROBE: NOT DETECTED
SITE: ABNORMAL
SODIUM SERPL-SCNC: 137 MMOL/L (ref 136–145)
SP02: 100
SPECIMEN SOURCE: NORMAL
SPONT RATE: 28
TROPONIN I SERPL HS-MCNC: 6.2 PG/ML (ref 0–14.9)
TROPONIN I SERPL HS-MCNC: <2.3 PG/ML (ref 0–14.9)
WBC # BLD AUTO: 10.57 K/UL (ref 3.9–12.7)

## 2024-12-28 PROCEDURE — 99900035 HC TECH TIME PER 15 MIN (STAT)

## 2024-12-28 PROCEDURE — G0378 HOSPITAL OBSERVATION PER HR: HCPCS

## 2024-12-28 PROCEDURE — 25000242 PHARM REV CODE 250 ALT 637 W/ HCPCS: Performed by: EMERGENCY MEDICINE

## 2024-12-28 PROCEDURE — 84484 ASSAY OF TROPONIN QUANT: CPT | Mod: 91 | Performed by: EMERGENCY MEDICINE

## 2024-12-28 PROCEDURE — 87635 SARS-COV-2 COVID-19 AMP PRB: CPT | Performed by: EMERGENCY MEDICINE

## 2024-12-28 PROCEDURE — 96372 THER/PROPH/DIAG INJ SC/IM: CPT

## 2024-12-28 PROCEDURE — 25000242 PHARM REV CODE 250 ALT 637 W/ HCPCS

## 2024-12-28 PROCEDURE — 25000003 PHARM REV CODE 250: Performed by: EMERGENCY MEDICINE

## 2024-12-28 PROCEDURE — 99900031 HC PATIENT EDUCATION (STAT)

## 2024-12-28 PROCEDURE — 94644 CONT INHLJ TX 1ST HOUR: CPT

## 2024-12-28 PROCEDURE — 94640 AIRWAY INHALATION TREATMENT: CPT | Mod: XB

## 2024-12-28 PROCEDURE — 63600175 PHARM REV CODE 636 W HCPCS: Performed by: EMERGENCY MEDICINE

## 2024-12-28 PROCEDURE — 80053 COMPREHEN METABOLIC PANEL: CPT | Performed by: EMERGENCY MEDICINE

## 2024-12-28 PROCEDURE — 96375 TX/PRO/DX INJ NEW DRUG ADDON: CPT

## 2024-12-28 PROCEDURE — 25000003 PHARM REV CODE 250

## 2024-12-28 PROCEDURE — 94761 N-INVAS EAR/PLS OXIMETRY MLT: CPT

## 2024-12-28 PROCEDURE — 85025 COMPLETE CBC W/AUTO DIFF WBC: CPT | Performed by: EMERGENCY MEDICINE

## 2024-12-28 PROCEDURE — 36600 WITHDRAWAL OF ARTERIAL BLOOD: CPT

## 2024-12-28 PROCEDURE — 63600175 PHARM REV CODE 636 W HCPCS: Performed by: HOSPITALIST

## 2024-12-28 PROCEDURE — 96366 THER/PROPH/DIAG IV INF ADDON: CPT

## 2024-12-28 PROCEDURE — 83735 ASSAY OF MAGNESIUM: CPT | Performed by: EMERGENCY MEDICINE

## 2024-12-28 PROCEDURE — 96365 THER/PROPH/DIAG IV INF INIT: CPT

## 2024-12-28 PROCEDURE — 83880 ASSAY OF NATRIURETIC PEPTIDE: CPT | Performed by: EMERGENCY MEDICINE

## 2024-12-28 PROCEDURE — 27100171 HC OXYGEN HIGH FLOW UP TO 24 HOURS

## 2024-12-28 PROCEDURE — 87798 DETECT AGENT NOS DNA AMP: CPT

## 2024-12-28 PROCEDURE — 87502 INFLUENZA DNA AMP PROBE: CPT | Performed by: EMERGENCY MEDICINE

## 2024-12-28 PROCEDURE — 94799 UNLISTED PULMONARY SVC/PX: CPT

## 2024-12-28 PROCEDURE — 99291 CRITICAL CARE FIRST HOUR: CPT

## 2024-12-28 PROCEDURE — 63600175 PHARM REV CODE 636 W HCPCS

## 2024-12-28 PROCEDURE — 94660 CPAP INITIATION&MGMT: CPT

## 2024-12-28 PROCEDURE — 93005 ELECTROCARDIOGRAM TRACING: CPT | Performed by: INTERNAL MEDICINE

## 2024-12-28 PROCEDURE — 25000242 PHARM REV CODE 250 ALT 637 W/ HCPCS: Performed by: HOSPITALIST

## 2024-12-28 PROCEDURE — 82803 BLOOD GASES ANY COMBINATION: CPT

## 2024-12-28 PROCEDURE — 93010 ELECTROCARDIOGRAM REPORT: CPT | Mod: ,,, | Performed by: INTERNAL MEDICINE

## 2024-12-28 RX ORDER — SODIUM,POTASSIUM PHOSPHATES 280-250MG
2 POWDER IN PACKET (EA) ORAL
Status: DISCONTINUED | OUTPATIENT
Start: 2024-12-28 | End: 2025-01-02 | Stop reason: HOSPADM

## 2024-12-28 RX ORDER — GLUCAGON 1 MG
1 KIT INJECTION
Status: DISCONTINUED | OUTPATIENT
Start: 2024-12-28 | End: 2025-01-02 | Stop reason: HOSPADM

## 2024-12-28 RX ORDER — NALOXONE HCL 0.4 MG/ML
0.02 VIAL (ML) INJECTION
Status: DISCONTINUED | OUTPATIENT
Start: 2024-12-28 | End: 2025-01-02 | Stop reason: HOSPADM

## 2024-12-28 RX ORDER — ENOXAPARIN SODIUM 100 MG/ML
40 INJECTION SUBCUTANEOUS EVERY 12 HOURS
Status: DISCONTINUED | OUTPATIENT
Start: 2024-12-28 | End: 2024-12-30

## 2024-12-28 RX ORDER — MAGNESIUM SULFATE HEPTAHYDRATE 40 MG/ML
2 INJECTION, SOLUTION INTRAVENOUS ONCE
Status: COMPLETED | OUTPATIENT
Start: 2024-12-28 | End: 2024-12-28

## 2024-12-28 RX ORDER — AMOXICILLIN 250 MG
1 CAPSULE ORAL 2 TIMES DAILY PRN
Status: DISCONTINUED | OUTPATIENT
Start: 2024-12-28 | End: 2025-01-02 | Stop reason: HOSPADM

## 2024-12-28 RX ORDER — QUETIAPINE FUMARATE 100 MG/1
300 TABLET, FILM COATED ORAL NIGHTLY
Status: DISCONTINUED | OUTPATIENT
Start: 2024-12-28 | End: 2025-01-02 | Stop reason: HOSPADM

## 2024-12-28 RX ORDER — BUDESONIDE 0.5 MG/2ML
0.5 INHALANT ORAL ONCE
Status: COMPLETED | OUTPATIENT
Start: 2024-12-28 | End: 2024-12-28

## 2024-12-28 RX ORDER — METHYLPREDNISOLONE SOD SUCC 125 MG
125 VIAL (EA) INJECTION
Status: COMPLETED | OUTPATIENT
Start: 2024-12-28 | End: 2024-12-28

## 2024-12-28 RX ORDER — TALC
6 POWDER (GRAM) TOPICAL NIGHTLY PRN
Status: DISCONTINUED | OUTPATIENT
Start: 2024-12-28 | End: 2025-01-02 | Stop reason: HOSPADM

## 2024-12-28 RX ORDER — IPRATROPIUM BROMIDE AND ALBUTEROL SULFATE 2.5; .5 MG/3ML; MG/3ML
3 SOLUTION RESPIRATORY (INHALATION)
Status: COMPLETED | OUTPATIENT
Start: 2024-12-28 | End: 2024-12-28

## 2024-12-28 RX ORDER — LEVALBUTEROL INHALATION SOLUTION 1.25 MG/3ML
1.25 SOLUTION RESPIRATORY (INHALATION)
Status: COMPLETED | OUTPATIENT
Start: 2024-12-28 | End: 2024-12-28

## 2024-12-28 RX ORDER — ALUMINUM HYDROXIDE, MAGNESIUM HYDROXIDE, AND SIMETHICONE 1200; 120; 1200 MG/30ML; MG/30ML; MG/30ML
30 SUSPENSION ORAL 4 TIMES DAILY PRN
Status: DISCONTINUED | OUTPATIENT
Start: 2024-12-28 | End: 2025-01-02 | Stop reason: HOSPADM

## 2024-12-28 RX ORDER — SODIUM CHLORIDE 0.9 % (FLUSH) 0.9 %
10 SYRINGE (ML) INJECTION EVERY 12 HOURS PRN
Status: DISCONTINUED | OUTPATIENT
Start: 2024-12-28 | End: 2025-01-02 | Stop reason: HOSPADM

## 2024-12-28 RX ORDER — LANOLIN ALCOHOL/MO/W.PET/CERES
800 CREAM (GRAM) TOPICAL
Status: DISCONTINUED | OUTPATIENT
Start: 2024-12-28 | End: 2025-01-02 | Stop reason: HOSPADM

## 2024-12-28 RX ORDER — METHYLPREDNISOLONE SOD SUCC 125 MG
60 VIAL (EA) INJECTION
Status: DISCONTINUED | OUTPATIENT
Start: 2024-12-29 | End: 2024-12-28

## 2024-12-28 RX ORDER — LORAZEPAM 2 MG/ML
0.5 INJECTION INTRAMUSCULAR
Status: COMPLETED | OUTPATIENT
Start: 2024-12-28 | End: 2024-12-28

## 2024-12-28 RX ORDER — GUAIFENESIN 100 MG/5ML
200 SOLUTION ORAL EVERY 4 HOURS PRN
Status: DISCONTINUED | OUTPATIENT
Start: 2024-12-28 | End: 2025-01-02 | Stop reason: HOSPADM

## 2024-12-28 RX ORDER — IPRATROPIUM BROMIDE 0.5 MG/2.5ML
500 SOLUTION RESPIRATORY (INHALATION) 4 TIMES DAILY
COMMUNITY
End: 2025-01-09 | Stop reason: SDUPTHER

## 2024-12-28 RX ORDER — IPRATROPIUM BROMIDE AND ALBUTEROL SULFATE 2.5; .5 MG/3ML; MG/3ML
3 SOLUTION RESPIRATORY (INHALATION)
Status: DISCONTINUED | OUTPATIENT
Start: 2024-12-28 | End: 2025-01-02 | Stop reason: HOSPADM

## 2024-12-28 RX ORDER — IBUPROFEN 200 MG
24 TABLET ORAL
Status: DISCONTINUED | OUTPATIENT
Start: 2024-12-28 | End: 2025-01-02 | Stop reason: HOSPADM

## 2024-12-28 RX ORDER — IPRATROPIUM BROMIDE 0.5 MG/2.5ML
0.5 SOLUTION RESPIRATORY (INHALATION) ONCE
Status: COMPLETED | OUTPATIENT
Start: 2024-12-28 | End: 2024-12-28

## 2024-12-28 RX ORDER — BENZONATATE 100 MG/1
100 CAPSULE ORAL 3 TIMES DAILY PRN
Status: DISCONTINUED | OUTPATIENT
Start: 2024-12-28 | End: 2025-01-02 | Stop reason: HOSPADM

## 2024-12-28 RX ORDER — HYDRALAZINE HYDROCHLORIDE 25 MG/1
25 TABLET, FILM COATED ORAL EVERY 8 HOURS PRN
Status: DISCONTINUED | OUTPATIENT
Start: 2024-12-28 | End: 2025-01-02 | Stop reason: HOSPADM

## 2024-12-28 RX ORDER — ACETAMINOPHEN 325 MG/1
650 TABLET ORAL EVERY 4 HOURS PRN
Status: DISCONTINUED | OUTPATIENT
Start: 2024-12-28 | End: 2025-01-02 | Stop reason: HOSPADM

## 2024-12-28 RX ORDER — LORAZEPAM 2 MG/ML
0.5 INJECTION INTRAMUSCULAR ONCE
Status: COMPLETED | OUTPATIENT
Start: 2024-12-28 | End: 2024-12-28

## 2024-12-28 RX ORDER — IBUPROFEN 200 MG
16 TABLET ORAL
Status: DISCONTINUED | OUTPATIENT
Start: 2024-12-28 | End: 2025-01-02 | Stop reason: HOSPADM

## 2024-12-28 RX ADMIN — LEVALBUTEROL HYDROCHLORIDE 1.25 MG: 1.25 SOLUTION RESPIRATORY (INHALATION) at 01:12

## 2024-12-28 RX ADMIN — LORAZEPAM 0.5 MG: 2 INJECTION, SOLUTION INTRAMUSCULAR; INTRAVENOUS at 10:12

## 2024-12-28 RX ADMIN — LORAZEPAM 0.5 MG: 2 INJECTION, SOLUTION INTRAMUSCULAR; INTRAVENOUS at 03:12

## 2024-12-28 RX ADMIN — BUDESONIDE INHALATION 0.5 MG: 0.5 SUSPENSION RESPIRATORY (INHALATION) at 01:12

## 2024-12-28 RX ADMIN — IPRATROPIUM BROMIDE AND ALBUTEROL SULFATE 3 ML: .5; 3 SOLUTION RESPIRATORY (INHALATION) at 08:12

## 2024-12-28 RX ADMIN — IPRATROPIUM BROMIDE 0.5 MG: 0.5 SOLUTION RESPIRATORY (INHALATION) at 01:12

## 2024-12-28 RX ADMIN — MAGNESIUM SULFATE HEPTAHYDRATE 2 G: 40 INJECTION, SOLUTION INTRAVENOUS at 02:12

## 2024-12-28 RX ADMIN — METHYLPREDNISOLONE SODIUM SUCCINATE 60 MG: 40 INJECTION, POWDER, FOR SOLUTION INTRAMUSCULAR; INTRAVENOUS at 08:12

## 2024-12-28 RX ADMIN — IPRATROPIUM BROMIDE AND ALBUTEROL SULFATE 3 ML: .5; 3 SOLUTION RESPIRATORY (INHALATION) at 07:12

## 2024-12-28 RX ADMIN — BENZONATATE 100 MG: 100 CAPSULE ORAL at 07:12

## 2024-12-28 RX ADMIN — SODIUM CHLORIDE 250 ML: 9 INJECTION, SOLUTION INTRAVENOUS at 02:12

## 2024-12-28 RX ADMIN — LEVALBUTEROL HYDROCHLORIDE 1.25 MG: 1.25 SOLUTION RESPIRATORY (INHALATION) at 02:12

## 2024-12-28 RX ADMIN — METHYLPREDNISOLONE SODIUM SUCCINATE 125 MG: 125 INJECTION, POWDER, FOR SOLUTION INTRAMUSCULAR; INTRAVENOUS at 02:12

## 2024-12-28 RX ADMIN — ENOXAPARIN SODIUM 40 MG: 40 INJECTION SUBCUTANEOUS at 08:12

## 2024-12-28 RX ADMIN — QUETIAPINE 300 MG: 100 TABLET ORAL at 08:12

## 2024-12-28 NOTE — CARE UPDATE
12/28/24 1307   Patient Assessment/Suction   Level of Consciousness (AVPU) alert   Respiratory Effort Short of breath   Expansion/Accessory Muscles/Retractions accessory muscle use   All Lung Fields Breath Sounds wheezes, expiratory   Rhythm/Pattern, Respiratory shortness of breath   Cough Frequency infrequent   Cough Type dry   PRE-TX-O2   Device (Oxygen Therapy) room air   SpO2 99 %   Pulse Oximetry Type Continuous   $ Pulse Oximetry - Multiple Charge Pulse Oximetry - Multiple   Pulse 94   Resp (!) 28   Aerosol Therapy   $ Aerosol Therapy Charges Aerosol Treatment   Daily Review of Necessity (SVN) completed   Respiratory Treatment Status (SVN) given   Treatment Route (SVN) mask;oxygen   Patient Position HOB elevated   Post Treatment Assessment (SVN) breath sounds unchanged   Signs of Intolerance (SVN) none   Breath Sounds Post-Respiratory Treatment   Throughout All Fields Post-Treatment All Fields   Throughout All Fields Post-Treatment no change   Post-treatment Heart Rate (beats/min) 93   Post-treatment Resp Rate (breaths/min) 23   Education   $ Education Bronchodilator;15 min

## 2024-12-28 NOTE — ED PROVIDER NOTES
Encounter Date: 12/28/2024       History     Chief Complaint   Patient presents with    Shortness of Breath     Patient short of breath x 2 days, hx of asthma and nebulizer not helping nor did breathing treatment at 10am - patient audibly wheezing in triage - sats are 97% but pt has increased wob       39-year-old female with a history of asthma presents complaining of coughing wheezing and shortness of breath typical of asthma.  Started having some nasal congestion several days ago, developed coughing followed by an asthma exacerbation.  Patient reports that she has been using a home nebulizer machine as well as an inhaler without significant improvement.  She denies fever or chills.  She denies chest pain but has tightness typical of asthma.  Denies any abdominal pain nausea vomiting or diarrhea.  Denies any urinary problems or changes.  Shortness of breath has been moderate to severe in intensity.  Denies any other problems or complaints.  Patient is a current smoker        Review of patient's allergies indicates:   Allergen Reactions    Asa [aspirin]     Aspirin Other (See Comments)     avoids due to asthma    Ultram [tramadol]     Ultram [tramadol] Nausea And Vomiting    Zofran [ondansetron hcl (pf)]     Zofran [ondansetron hcl (pf)] Other (See Comments)     Gives migraines     Past Medical History:   Diagnosis Date    Anxiety     Asthma     Bipolar affective disorder, current episode manic with psychotic symptoms     Hypertension     Manic bipolar I disorder     Mental health disorder     PTSD (post-traumatic stress disorder)     Substance abuse      Past Surgical History:   Procedure Laterality Date    APPENDECTOMY      c sections      x 3    CHOLECYSTECTOMY      ESOPHAGOGASTRODUODENOSCOPY N/A 9/15/2022    Procedure: EGD (ESOPHAGOGASTRODUODENOSCOPY);  Surgeon: Law Sheriff MD;  Location: [facility];  Service: Endoscopy;  Laterality: N/A;    HYSTERECTOMY      TONSILLECTOMY      tonsils and adenoidectomy        Family History   Problem Relation Name Age of Onset    Ovarian cancer Mother      Cancer Other m great gm         breast    No Known Problems Father      Diabetes Paternal Grandmother       Social History     Tobacco Use    Smoking status: Every Day     Current packs/day: 0.50     Types: Cigarettes    Smokeless tobacco: Never   Substance Use Topics    Alcohol use: Never    Drug use: Yes     Types: Marijuana     Comment: daily     Review of Systems   Constitutional:  Positive for activity change, appetite change and fatigue. Negative for chills and fever.   HENT:  Positive for congestion and rhinorrhea. Negative for drooling, sinus pressure, sinus pain, sore throat and trouble swallowing.    Eyes: Negative.  Negative for photophobia, pain and visual disturbance.   Respiratory:  Positive for cough, shortness of breath and wheezing.    Cardiovascular: Negative.  Negative for chest pain, palpitations and leg swelling.   Gastrointestinal: Negative.  Negative for abdominal distention, abdominal pain, blood in stool, constipation, diarrhea, nausea and vomiting.   Endocrine: Negative.    Genitourinary: Negative.  Negative for decreased urine volume, difficulty urinating, dysuria, flank pain, frequency and urgency.   Musculoskeletal: Negative.  Negative for arthralgias, back pain, myalgias and neck pain.   Skin: Negative.  Negative for rash.   Neurological: Negative.  Negative for dizziness, syncope, facial asymmetry, speech difficulty, weakness, light-headedness, numbness and headaches.   Hematological:  Does not bruise/bleed easily.   Psychiatric/Behavioral: Negative.  Negative for confusion.    All other systems reviewed and are negative.      Physical Exam     Initial Vitals [12/28/24 1234]   BP Pulse Resp Temp SpO2   (!) 127/94 98 (!) 24 98.2 °F (36.8 °C) 97 %      MAP       --         Physical Exam    Nursing note and vitals reviewed.  Constitutional: She is cooperative. She appears distressed.   Patient appears  tachypneic, is able to speak in 2 word sentences.  No tripoding noted    HENT:   Head: Normocephalic and atraumatic.   Nose: Nose normal. Mouth/Throat: Uvula is midline, oropharynx is clear and moist and mucous membranes are normal. No oropharyngeal exudate, posterior oropharyngeal edema or posterior oropharyngeal erythema.   Eyes: Conjunctivae, EOM and lids are normal. Pupils are equal, round, and reactive to light. No scleral icterus.   Neck: Trachea normal and phonation normal. Neck supple. No stridor present. No JVD present.   Normal range of motion.   Full passive range of motion without pain.     Cardiovascular:  Normal rate, regular rhythm, normal heart sounds, intact distal pulses and normal pulses.     Exam reveals no gallop, no distant heart sounds, no friction rub and no decreased pulses.       No murmur heard.  Pulmonary/Chest: Accessory muscle usage present. Tachypnea noted. She is in respiratory distress. She has decreased breath sounds. She has wheezes. She has no rhonchi. She has rales.   Abdominal: Abdomen is soft. Bowel sounds are normal. She exhibits no distension and no mass. There is no abdominal tenderness.   No right CVA tenderness.  No left CVA tenderness. There is no rigidity.   Musculoskeletal:         General: No tenderness or edema. Normal range of motion.      Right hand: Normal. Normal capillary refill. Normal pulse.      Left hand: Normal. Normal sensation. Normal capillary refill. Normal pulse.      Cervical back: Normal, full passive range of motion without pain, normal range of motion and neck supple. No bony tenderness. No pain with movement or spinous process tenderness. Normal range of motion.      Thoracic back: Normal. No tenderness. Normal range of motion.      Lumbar back: Normal. No tenderness. Normal range of motion.      Right lower leg: Normal. No swelling or tenderness.      Left lower leg: Normal. No swelling or tenderness.      Right foot: Normal. Normal capillary  refill. Normal pulse.      Left foot: Normal. Normal capillary refill. Normal pulse.      Comments: Pulses are 2+ throughout, cap refill is less than 2 sec throughout, extremities are nontender throughout with full range of motion. There is no spinal tenderness to palpation.     Neurological: She is alert and oriented to person, place, and time. She has normal strength. No cranial nerve deficit or sensory deficit. Coordination and gait normal. GCS score is 15. GCS eye subscore is 4. GCS verbal subscore is 5. GCS motor subscore is 6.   No focal deficits.   Skin: Skin is warm, dry and intact. Capillary refill takes less than 2 seconds. No ecchymosis, no petechiae and no rash noted. No erythema. No pallor.   Psychiatric: Her speech is normal and behavior is normal. Her mood appears anxious. Cognition and memory are normal.         ED Course   Procedures  Labs Reviewed   ISTAT PROCEDURE - Abnormal       Result Value    POC PH 7.520 (*)     POC PCO2 26.9 (*)     POC PO2 146 (*)     POC HCO3 22.0 (*)     POC BE -1      POC SATURATED O2 100      POC TCO2 23      Rate 16      Sample ARTERIAL      Site RR      Allens Test Pass      DelSys CPAP/BiPAP      Mode BiPAP      FiO2 28      Spont Rate 28      Min Vol 29      Sp02 100      IP 10      EP 5     MAGNESIUM    Magnesium 1.9     CBC W/ AUTO DIFFERENTIAL    WBC 10.57      RBC 4.37      Hemoglobin 13.0      Hematocrit 39.7      MCV 91      MCH 29.7      MCHC 32.7      RDW 14.4      Platelets 329      MPV 9.4      Immature Granulocytes 0.4      Gran # (ANC) 5.1      Immature Grans (Abs) 0.04      Lymph # 4.5      Mono # 0.8      Eos # 0.1      Baso # 0.04      nRBC 0      Gran % 48.2      Lymph % 42.4      Mono % 7.7      Eosinophil % 0.9      Basophil % 0.4      Differential Method Automated     COMPREHENSIVE METABOLIC PANEL    Sodium 137      Potassium 3.8      Chloride 106      CO2 23      Glucose 93      BUN 12      Creatinine 0.8      Calcium 8.8      Total Protein 6.5       Albumin 3.8      Total Bilirubin 0.4      Alkaline Phosphatase 73      AST 14      ALT 18      eGFR >60.0      Anion Gap 8     TROPONIN I HIGH SENSITIVITY    Troponin I High Sensitivity 6.2     B-TYPE NATRIURETIC PEPTIDE    BNP 13     SARS-COV-2 RNA AMPLIFICATION, QUAL    SARS-CoV-2 RNA, Amplification, Qual Negative     INFLUENZA A AND B ANTIGEN    Influenza A, Molecular Negative      Influenza B, Molecular Negative      Flu A & B Source Nasal swab      Narrative:     Specimen Source->Nasopharyngeal Swab   TROPONIN I HIGH SENSITIVITY   POCT URINE PREGNANCY        ECG Results              EKG 12-lead (In process)        Collection Time Result Time QRS Duration OHS QTC Calculation    12/28/24 13:16:09 12/28/24 13:40:35 88 438                     In process by Interface, Lab In [facility] (12/28/24 13:40:41)                   Narrative:    Test Reason : R06.02,    Vent. Rate :  89 BPM     Atrial Rate :  89 BPM     P-R Int : 162 ms          QRS Dur :  88 ms      QT Int : 360 ms       P-R-T Axes :  15  30  42 degrees    QTcB Int : 438 ms    Normal sinus rhythm  Normal ECG  When compared with ECG of 30-Oct-2024 03:13,  No significant change was found    Referred By: AAAREFERRAL SELF           Confirmed By:                                   Imaging Results              X-Ray Chest AP Portable (Final result)  Result time 12/28/24 13:20:37      Final result by Jillian Collazo MD (12/28/24 13:20:37)                   Impression:      No acute cardiopulmonary abnormality.      Electronically signed by: Jillian Collazo  Date:    12/28/2024  Time:    13:20               Narrative:    EXAMINATION:  XR CHEST AP PORTABLE    CLINICAL HISTORY:  Asthma;    FINDINGS:  Portable chest at 13:03 hours is compared to 12/15/2024 shows normal cardiomediastinal silhouette.    Lungs are clear. Pulmonary vasculature is normal. No acute osseous abnormality.                                       Medications   levalbuterol nebulizer solution  1.25 mg (1.25 mg Nebulization Given 12/28/24 1307)   ipratropium 0.02 % nebulizer solution 0.5 mg (0.5 mg Nebulization Given 12/28/24 1307)   budesonide nebulizer solution 0.5 mg (0.5 mg Nebulization Given 12/28/24 1308)   methylPREDNISolone sodium succinate injection 125 mg (125 mg Intravenous Given 12/28/24 1421)   sodium chloride 0.9% bolus 250 mL 250 mL (0 mLs Intravenous Stopped 12/28/24 1618)   magnesium sulfate 2g in water 50mL IVPB (premix) (0 g Intravenous Stopped 12/28/24 1634)   levalbuterol nebulizer solution 1.25 mg (1.25 mg Nebulization Given 12/28/24 1457)   LORazepam injection 0.5 mg (0.5 mg Intravenous Given 12/28/24 1536)     Medical Decision Making  Patient initially given nebulizer treatments.  No marked improvement noted, patient appeared somewhat fatigued as well.  BiPAP initiated.  Respiratory treatments continued.  Solu-Medrol and magnesium given.  Patient has shown symptomatic improvement.  Did report anxiety.  Small dose of Ativan was given as well.  Patient has been able to be weaned off of BiPAP however is still not back to baseline--inspiratory expiratory wheezes noted although markedly improved and mild tachypnea still noted.  Labs reviewed.  I have discussed the case with the hospitalist provider who has assumed care and will admit.    Amount and/or Complexity of Data Reviewed  Labs: ordered.  Radiology: ordered.    Risk  Prescription drug management.              Attending Attestation:         Attending Critical Care:   Critical Care Times:   Direct Patient Care (initial evaluation, reassessments, and time considering the case)................................................................13 minutes.   Additional History from reviewing old medical records or taking additional history from the family, EMS, PCP, etc.......................5 minutes.   Ordering, Reviewing, and Interpreting Diagnostic  Studies...............................................................................................................6 minutes.   Documentation..................................................................................................................................................................................7 minutes.   Consultation with other Physicians. .................................................................................................................................................6 minutes.   Consultation with the patient's family directly relating to the patient's condition, care, and DNR status (when patient unable)......5 minutes.   ==============================================================  Total Critical Care Time - exclusive of procedural time: 42 minutes.  ==============================================================                                 Clinical Impression:  Final diagnoses:  [R06.02] Shortness of breath                 rPiscilla Dillon MD  12/28/24 180

## 2024-12-28 NOTE — CARE UPDATE
12/28/24 1330   Patient Assessment/Suction   Level of Consciousness (AVPU) alert   Respiratory Effort Short of breath   Expansion/Accessory Muscles/Retractions accessory muscle use   All Lung Fields Breath Sounds wheezes, expiratory   Rhythm/Pattern, Respiratory assisted mechanically   Skin Integrity   $ Wound Care Tech Time 15 min   Area Observed Bridge of nose   Skin Appearance without discoloration   Barrier used? Transparent Film   PRE-TX-O2   Device (Oxygen Therapy) BIPAP   $ Is the patient on High Flow Oxygen? Yes   Oxygen Concentration (%) 28   SpO2 100 %   Pulse 86   Resp 13   Preset CPAP/BiPAP Settings   Mode Of Delivery BiPAP S/T   $ CPAP/BiPAP Daily Charge 1   CPAP/BIPAP charged w/in last 24 h YES   $ Initial CPAP/BiPAP Setup? Yes   $ Is patient using? Yes   Size of Mask Small/Medium   Sized Appropriately? Yes   Equipment Type V60   Airway Device Type medium full face mask   Ipap 10   EPAP (cm H2O) 5   Pressure Support (cm H2O) 5   Set Rate (Breaths/Min) 16   ITime (sec) 1   Rise Time (sec) 3   Patient CPAP/BiPAP Settings   CPAP/BIPAP ID 13   RR Total (Breaths/Min) 32   Tidal Volume (mL) 829   VE Minute Ventilation (L/min) 28.6 L/min   Peak Inspiratory Pressure (cm H2O) 15   TiTOT (%) 22   Total Leak (L/Min) 8   Patient Trigger - ST Mode Only (%) 79   CPAP/BiPAP Backup Settings   Backup Rate 16 breaths per minute (bpm)   CPAP/BiPAP Alarms   High Pressure (cm H2O) 30   Low Pressure (cm H2O) 6   Minute Ventilation (L/Min) 3   High RR (breaths/min) 45   Low RR (breaths/min) 8   Apnea (Sec) 20   Education   $ Education BiPAP;15 min

## 2024-12-29 ENCOUNTER — CLINICAL SUPPORT (OUTPATIENT)
Dept: CARDIOLOGY | Facility: HOSPITAL | Age: 39
End: 2024-12-29
Attending: EMERGENCY MEDICINE
Payer: COMMERCIAL

## 2024-12-29 PROBLEM — J96.01 ACUTE RESPIRATORY FAILURE WITH HYPOXIA AND HYPERCARBIA: Status: RESOLVED | Noted: 2020-08-20 | Resolved: 2024-12-29

## 2024-12-29 PROBLEM — M79.89 SWELLING OF LOWER EXTREMITY: Status: ACTIVE | Noted: 2024-12-29

## 2024-12-29 PROBLEM — J96.02 ACUTE RESPIRATORY FAILURE WITH HYPOXIA AND HYPERCARBIA: Status: RESOLVED | Noted: 2020-08-20 | Resolved: 2024-12-29

## 2024-12-29 PROBLEM — J96.02 ACUTE RESPIRATORY FAILURE WITH HYPOXIA AND HYPERCARBIA: Status: ACTIVE | Noted: 2020-08-20

## 2024-12-29 PROBLEM — R07.81 PLEURITIC CHEST PAIN: Status: ACTIVE | Noted: 2024-12-29

## 2024-12-29 LAB
ANION GAP SERPL CALC-SCNC: 8 MMOL/L (ref 8–16)
AORTIC ROOT ANNULUS: 2.9 CM
AORTIC VALVE CUSP SEPERATION: 2.5 CM
APICAL FOUR CHAMBER EJECTION FRACTION: 65 %
APICAL TWO CHAMBER EJECTION FRACTION: 64 %
AV INDEX (PROSTH): 0.73
AV MEAN GRADIENT: 7 MMHG
AV PEAK GRADIENT: 13 MMHG
AV VALVE AREA BY VELOCITY RATIO: 3 CM²
AV VALVE AREA: 3 CM²
AV VELOCITY RATIO: 0.72
BASOPHILS # BLD AUTO: 0.01 K/UL (ref 0–0.2)
BASOPHILS NFR BLD: 0.1 % (ref 0–1.9)
BSA FOR ECHO PROCEDURE: 2.82 M2
BUN SERPL-MCNC: 16 MG/DL (ref 6–20)
CALCIUM SERPL-MCNC: 8.9 MG/DL (ref 8.7–10.5)
CHLORIDE SERPL-SCNC: 108 MMOL/L (ref 95–110)
CO2 SERPL-SCNC: 22 MMOL/L (ref 23–29)
CREAT SERPL-MCNC: 0.8 MG/DL (ref 0.5–1.4)
CV ECHO LV RWT: 0.77 CM
DIFFERENTIAL METHOD BLD: ABNORMAL
DOP CALC AO PEAK VEL: 1.8 M/S
DOP CALC AO VTI: 34.3 CM
DOP CALC LVOT AREA: 4.2 CM2
DOP CALC LVOT DIAMETER: 2.3 CM
DOP CALC LVOT PEAK VEL: 1.3 M/S
DOP CALC LVOT STROKE VOLUME: 103.4 CM3
DOP CALC MV VTI: 20.9 CM
DOP CALCLVOT PEAK VEL VTI: 24.9 CM
E WAVE DECELERATION TIME: 196 MSEC
E/A RATIO: 1.15
E/E' RATIO: 10.42 M/S
ECHO LV POSTERIOR WALL: 1.5 CM (ref 0.6–1.1)
EOSINOPHIL # BLD AUTO: 0 K/UL (ref 0–0.5)
EOSINOPHIL NFR BLD: 0 % (ref 0–8)
ERYTHROCYTE [DISTWIDTH] IN BLOOD BY AUTOMATED COUNT: 14.5 % (ref 11.5–14.5)
EST. GFR  (NO RACE VARIABLE): >60 ML/MIN/1.73 M^2
FRACTIONAL SHORTENING: 30.8 % (ref 28–44)
GLUCOSE SERPL-MCNC: 222 MG/DL (ref 70–110)
HCT VFR BLD AUTO: 39.2 % (ref 37–48.5)
HGB BLD-MCNC: 12.9 G/DL (ref 12–16)
IMM GRANULOCYTES # BLD AUTO: 0.08 K/UL (ref 0–0.04)
IMM GRANULOCYTES NFR BLD AUTO: 0.5 % (ref 0–0.5)
INTERVENTRICULAR SEPTUM: 1.5 CM (ref 0.6–1.1)
IVC DIAMETER: 2.5 CM
LEFT ATRIUM AREA SYSTOLIC (APICAL 2 CHAMBER): 20.8 CM2
LEFT ATRIUM AREA SYSTOLIC (APICAL 4 CHAMBER): 23.2 CM2
LEFT ATRIUM VOLUME INDEX MOD: 25.3 ML/M2
LEFT ATRIUM VOLUME MOD: 65.6 ML
LEFT INTERNAL DIMENSION IN SYSTOLE: 2.7 CM (ref 2.1–4)
LEFT VENTRICLE DIASTOLIC VOLUME INDEX: 25.45 ML/M2
LEFT VENTRICLE DIASTOLIC VOLUME: 65.91 ML
LEFT VENTRICLE END DIASTOLIC VOLUME APICAL 2 CHAMBER: 129 ML
LEFT VENTRICLE END DIASTOLIC VOLUME APICAL 4 CHAMBER: 119 ML
LEFT VENTRICLE END SYSTOLIC VOLUME APICAL 2 CHAMBER: 58.6 ML
LEFT VENTRICLE END SYSTOLIC VOLUME APICAL 4 CHAMBER: 69.5 ML
LEFT VENTRICLE MASS INDEX: 86.7 G/M2
LEFT VENTRICLE SYSTOLIC VOLUME INDEX: 10.4 ML/M2
LEFT VENTRICLE SYSTOLIC VOLUME: 27.02 ML
LEFT VENTRICULAR INTERNAL DIMENSION IN DIASTOLE: 3.9 CM (ref 3.5–6)
LEFT VENTRICULAR MASS: 224.6 G
LV LATERAL E/E' RATIO: 9.9 M/S
LV SEPTAL E/E' RATIO: 11 M/S
LVED V (TEICH): 65.91 ML
LVES V (TEICH): 27.02 ML
LVOT MG: 4 MMHG
LVOT MV: 0.86 CM/S
LYMPHOCYTES # BLD AUTO: 1.6 K/UL (ref 1–4.8)
LYMPHOCYTES NFR BLD: 9.8 % (ref 18–48)
MAGNESIUM SERPL-MCNC: 2 MG/DL (ref 1.6–2.6)
MCH RBC QN AUTO: 29.9 PG (ref 27–31)
MCHC RBC AUTO-ENTMCNC: 32.9 G/DL (ref 32–36)
MCV RBC AUTO: 91 FL (ref 82–98)
MONOCYTES # BLD AUTO: 0.3 K/UL (ref 0.3–1)
MONOCYTES NFR BLD: 1.7 % (ref 4–15)
MV MEAN GRADIENT: 2 MMHG
MV PEAK A VEL: 0.86 M/S
MV PEAK E VEL: 0.99 M/S
MV PEAK GRADIENT: 3 MMHG
MV STENOSIS PRESSURE HALF TIME: 49 MS
MV VALVE AREA BY CONTINUITY EQUATION: 4.95 CM2
MV VALVE AREA P 1/2 METHOD: 4.49 CM2
NEUTROPHILS # BLD AUTO: 14.2 K/UL (ref 1.8–7.7)
NEUTROPHILS NFR BLD: 87.9 % (ref 38–73)
NRBC BLD-RTO: 0 /100 WBC
OHS LV EJECTION FRACTION SIMPSONS BIPLANE MOD: 66 %
PHOSPHATE SERPL-MCNC: 1.4 MG/DL (ref 2.7–4.5)
PLATELET # BLD AUTO: 335 K/UL (ref 150–450)
PMV BLD AUTO: 9.4 FL (ref 9.2–12.9)
POTASSIUM SERPL-SCNC: 3.9 MMOL/L (ref 3.5–5.1)
PV MV: 0.91 M/S
PV PEAK GRADIENT: 7 MMHG
PV PEAK VELOCITY: 1.3 M/S
RA PRESSURE ESTIMATED: 8 MMHG
RBC # BLD AUTO: 4.31 M/UL (ref 4–5.4)
RIGHT ATRIUM VOLUME AREA LENGTH APICAL 4 CHAMBER: 30.3 ML
RV TISSUE DOPPLER FREE WALL SYSTOLIC VELOCITY 1 (APICAL 4 CHAMBER VIEW): 11.6 CM/S
SINUS: 3.7 CM
SODIUM SERPL-SCNC: 138 MMOL/L (ref 136–145)
TDI LATERAL: 0.1 M/S
TDI SEPTAL: 0.09 M/S
TDI: 0.1 M/S
TRICUSPID ANNULAR PLANE SYSTOLIC EXCURSION: 2.59 CM
WBC # BLD AUTO: 16.18 K/UL (ref 3.9–12.7)
Z-SCORE OF LEFT VENTRICULAR DIMENSION IN END DIASTOLE: -15.25
Z-SCORE OF LEFT VENTRICULAR DIMENSION IN END SYSTOLE: -10.76

## 2024-12-29 PROCEDURE — 94640 AIRWAY INHALATION TREATMENT: CPT

## 2024-12-29 PROCEDURE — 93306 TTE W/DOPPLER COMPLETE: CPT

## 2024-12-29 PROCEDURE — 94640 AIRWAY INHALATION TREATMENT: CPT | Mod: XB

## 2024-12-29 PROCEDURE — 63600175 PHARM REV CODE 636 W HCPCS

## 2024-12-29 PROCEDURE — 25000242 PHARM REV CODE 250 ALT 637 W/ HCPCS

## 2024-12-29 PROCEDURE — 83735 ASSAY OF MAGNESIUM: CPT

## 2024-12-29 PROCEDURE — 21000000 HC CCU ICU ROOM CHARGE

## 2024-12-29 PROCEDURE — 99900035 HC TECH TIME PER 15 MIN (STAT)

## 2024-12-29 PROCEDURE — 99900031 HC PATIENT EDUCATION (STAT)

## 2024-12-29 PROCEDURE — 80048 BASIC METABOLIC PNL TOTAL CA: CPT

## 2024-12-29 PROCEDURE — 25500020 PHARM REV CODE 255

## 2024-12-29 PROCEDURE — 84100 ASSAY OF PHOSPHORUS: CPT

## 2024-12-29 PROCEDURE — 94761 N-INVAS EAR/PLS OXIMETRY MLT: CPT

## 2024-12-29 PROCEDURE — 63600175 PHARM REV CODE 636 W HCPCS: Performed by: HOSPITALIST

## 2024-12-29 PROCEDURE — 93306 TTE W/DOPPLER COMPLETE: CPT | Mod: 26,,, | Performed by: INTERNAL MEDICINE

## 2024-12-29 PROCEDURE — 25000003 PHARM REV CODE 250

## 2024-12-29 PROCEDURE — 99223 1ST HOSP IP/OBS HIGH 75: CPT | Mod: ,,, | Performed by: INTERNAL MEDICINE

## 2024-12-29 PROCEDURE — 36415 COLL VENOUS BLD VENIPUNCTURE: CPT

## 2024-12-29 PROCEDURE — 85025 COMPLETE CBC W/AUTO DIFF WBC: CPT

## 2024-12-29 PROCEDURE — 94660 CPAP INITIATION&MGMT: CPT

## 2024-12-29 PROCEDURE — 63600175 PHARM REV CODE 636 W HCPCS: Performed by: INTERNAL MEDICINE

## 2024-12-29 RX ORDER — MORPHINE SULFATE 2 MG/ML
1 INJECTION, SOLUTION INTRAMUSCULAR; INTRAVENOUS EVERY 6 HOURS PRN
Status: DISCONTINUED | OUTPATIENT
Start: 2024-12-29 | End: 2025-01-02

## 2024-12-29 RX ORDER — LORAZEPAM 2 MG/ML
1 INJECTION INTRAMUSCULAR ONCE
Status: COMPLETED | OUTPATIENT
Start: 2024-12-29 | End: 2024-12-29

## 2024-12-29 RX ORDER — METHYLPREDNISOLONE SOD SUCC 125 MG
125 VIAL (EA) INJECTION
Status: DISCONTINUED | OUTPATIENT
Start: 2024-12-29 | End: 2024-12-30

## 2024-12-29 RX ORDER — LORAZEPAM 2 MG/ML
1 INJECTION INTRAMUSCULAR EVERY 6 HOURS PRN
Status: DISCONTINUED | OUTPATIENT
Start: 2024-12-29 | End: 2025-01-02 | Stop reason: HOSPADM

## 2024-12-29 RX ORDER — ALBUTEROL SULFATE 0.83 MG/ML
5 SOLUTION RESPIRATORY (INHALATION) ONCE
Status: DISCONTINUED | OUTPATIENT
Start: 2024-12-29 | End: 2024-12-30

## 2024-12-29 RX ORDER — ALBUTEROL SULFATE 0.83 MG/ML
2.5 SOLUTION RESPIRATORY (INHALATION)
Status: DISCONTINUED | OUTPATIENT
Start: 2024-12-29 | End: 2024-12-29

## 2024-12-29 RX ORDER — MAGNESIUM SULFATE HEPTAHYDRATE 40 MG/ML
2 INJECTION, SOLUTION INTRAVENOUS ONCE
Status: COMPLETED | OUTPATIENT
Start: 2024-12-29 | End: 2024-12-29

## 2024-12-29 RX ADMIN — LORAZEPAM 1 MG: 2 INJECTION INTRAMUSCULAR; INTRAVENOUS at 04:12

## 2024-12-29 RX ADMIN — ENOXAPARIN SODIUM 40 MG: 40 INJECTION SUBCUTANEOUS at 09:12

## 2024-12-29 RX ADMIN — METHYLPREDNISOLONE SODIUM SUCCINATE 125 MG: 125 INJECTION, POWDER, FOR SOLUTION INTRAMUSCULAR; INTRAVENOUS at 08:12

## 2024-12-29 RX ADMIN — MAGNESIUM SULFATE HEPTAHYDRATE 2 G: 40 INJECTION, SOLUTION INTRAVENOUS at 02:12

## 2024-12-29 RX ADMIN — ENOXAPARIN SODIUM 40 MG: 40 INJECTION SUBCUTANEOUS at 08:12

## 2024-12-29 RX ADMIN — POTASSIUM & SODIUM PHOSPHATES POWDER PACK 280-160-250 MG 2 PACKET: 280-160-250 PACK at 06:12

## 2024-12-29 RX ADMIN — METHYLPREDNISOLONE SODIUM SUCCINATE 60 MG: 40 INJECTION, POWDER, FOR SOLUTION INTRAMUSCULAR; INTRAVENOUS at 03:12

## 2024-12-29 RX ADMIN — IPRATROPIUM BROMIDE AND ALBUTEROL SULFATE 3 ML: .5; 3 SOLUTION RESPIRATORY (INHALATION) at 11:12

## 2024-12-29 RX ADMIN — MORPHINE SULFATE 1 MG: 2 INJECTION, SOLUTION INTRAMUSCULAR; INTRAVENOUS at 11:12

## 2024-12-29 RX ADMIN — POTASSIUM & SODIUM PHOSPHATES POWDER PACK 280-160-250 MG 2 PACKET: 280-160-250 PACK at 04:12

## 2024-12-29 RX ADMIN — GUAIFENESIN 200 MG: 200 SOLUTION ORAL at 06:12

## 2024-12-29 RX ADMIN — GUAIFENESIN 200 MG: 200 SOLUTION ORAL at 04:12

## 2024-12-29 RX ADMIN — IPRATROPIUM BROMIDE AND ALBUTEROL SULFATE 3 ML: .5; 3 SOLUTION RESPIRATORY (INHALATION) at 07:12

## 2024-12-29 RX ADMIN — GUAIFENESIN 200 MG: 200 SOLUTION ORAL at 12:12

## 2024-12-29 RX ADMIN — IOHEXOL 100 ML: 350 INJECTION, SOLUTION INTRAVENOUS at 12:12

## 2024-12-29 RX ADMIN — LORAZEPAM 1 MG: 2 INJECTION, SOLUTION INTRAMUSCULAR; INTRAVENOUS at 10:12

## 2024-12-29 RX ADMIN — IPRATROPIUM BROMIDE AND ALBUTEROL SULFATE 3 ML: .5; 3 SOLUTION RESPIRATORY (INHALATION) at 08:12

## 2024-12-29 RX ADMIN — MORPHINE SULFATE 1 MG: 2 INJECTION, SOLUTION INTRAMUSCULAR; INTRAVENOUS at 05:12

## 2024-12-29 RX ADMIN — QUETIAPINE 300 MG: 100 TABLET ORAL at 08:12

## 2024-12-29 RX ADMIN — IPRATROPIUM BROMIDE AND ALBUTEROL SULFATE 3 ML: .5; 3 SOLUTION RESPIRATORY (INHALATION) at 02:12

## 2024-12-29 RX ADMIN — METHYLPREDNISOLONE SODIUM SUCCINATE 125 MG: 125 INJECTION, POWDER, FOR SOLUTION INTRAMUSCULAR; INTRAVENOUS at 11:12

## 2024-12-29 RX ADMIN — POTASSIUM & SODIUM PHOSPHATES POWDER PACK 280-160-250 MG 2 PACKET: 280-160-250 PACK at 09:12

## 2024-12-29 RX ADMIN — POTASSIUM & SODIUM PHOSPHATES POWDER PACK 280-160-250 MG 2 PACKET: 280-160-250 PACK at 08:12

## 2024-12-29 NOTE — ASSESSMENT & PLAN NOTE
- given lower extremity swelling, high probability of PE, CTA PE protocol  - telemetry   -follow up 2D echocardiogram   -troponins negative x2

## 2024-12-29 NOTE — ASSESSMENT & PLAN NOTE
- Presenting to the ED with a chief complaint of shortness of breath, found to be in asthma exacerbation  - Multiple nebulization treatments, lorazepam, Mag sulfate, and methylprednisolone given while in the ED.  - DuoNebs Q4H while awake  - Methylprednisolone 60 mg IV BID  - COVID and flu negative  - Respiratory panel pending  - Pulmonology consulted, appreciate recs  - Respiratory eval and treat Q6H, appreciate recs

## 2024-12-29 NOTE — PLAN OF CARE
[facility]  Initial Discharge Assessment       Primary Care Provider: No, Primary Doctor    Admission Diagnosis: Asthma exacerbation [J45.901]  Shortness of breath [R06.02]    Admission Date: 12/28/2024  Expected Discharge Date:  Assessment completed at bedside with pt and significant other  , and all information on FaceSheet confirmed, including demographics, PCP, pharmacy and insurance.  She currently lives with her significant other in a one story home. She does not have a PCP and preferred Pharmacy is [facility].  She denies any HH/HD/Blood Thinners.  For transportation to appointments, she usually provides her own transportation .  For transportation at discharge,  her significant other will provide transportation.  Plan for discharge is home with no needs at this time .  Case Management to continue to follow for discharge planning needs.            Payor: [employer] [facility] [employer] / Plan: [employer] / Product Type: Commercial /     Extended Emergency Contact Information  Primary Emergency Contact: Autumn Baig  Address: [address] [place] [address]  Home Phone: [phone]  Mobile Phone: [phone]  Relation: Mother  Preferred language: English   needed? No    Discharge Plan A: Home with family  Discharge Plan B: Home with family      [id number] [address] AT [facility] & [address] [address]  Phone: [phone] Fax: [phone]      Initial Assessment (most recent)       Adult Discharge Assessment - 12/29/24 1527          Discharge Assessment    Assessment Type Discharge Planning Assessment     Confirmed/corrected address, phone number and insurance Yes     Confirmed Demographics Correct on Facesheet     Source of Information patient     Does patient/caregiver understand observation status Yes     People in Home  significant other     Do you expect to return to your current living situation? Yes     Do you have help at home or someone to help you manage your care at home? Yes     Who are your caregiver(s) and their phone number(s)? Yunior     Prior to hospitilization cognitive status: Alert/Oriented     Current cognitive status: Alert/Oriented     Walking or Climbing Stairs Difficulty no     Dressing/Bathing Difficulty no     Equipment Currently Used at Home none     Readmission within 30 days? No     Patient currently being followed by outpatient case management? No     Do you currently have service(s) that help you manage your care at home? No     Do you take prescription medications? Yes     Do you have prescription coverage? Yes     Coverage Primary- [facility] Secondary-  Medicaid [employer]     Do you have any problems affording any of your prescribed medications? No     Is the patient taking medications as prescribed? yes     Who is going to help you get home at discharge? Yunior     How do you get to doctors appointments? car, drives self     Are you on dialysis? No     Do you take coumadin? No     Discharge Plan A Home with family     Discharge Plan B Home with family

## 2024-12-29 NOTE — ASSESSMENT & PLAN NOTE
- duplex venous ultrasound given pain and swelling, concern of DVT  - obesity is high-risk  - BNP normal  - follow up ECHO

## 2024-12-29 NOTE — HPI
Ms. Esquivel is a 39 yr old female with a hx of obesity, HTN, asthma, tobacco use, bipolar disorder, anxiety, PTSD who presents to ED with a chief complaint of shortness of breath.  Patient states that initially her symptoms began as a dry cough 3 days ago and the next day she began having shortness of breath.  She does endorse being out in the cold weather and rain recently prior to symptom onset.  She states over the last 3 days her symptoms have progressively worsened.  She states that any kind of exertion exacerbates her symptoms.  She also endorses associated wheezing and lightheadedness.  She has tried her home inhalers and nebulizer treatments with minimal relief.  She smokes about half a pack of cigarettes a day and denies alcohol and recreational drug use.  She denies fever, chills, chest pain, abdominal pain, nausea, vomiting, diarrhea, dysuria, hematuria, syncope.    Upon arrival to ED, patient afebrile, HR of 98, RR of 24, BP of 127/94, satting 97% on RA.  Initial labs in the ED unremarkable.  CXR with no acute cardiopulmonary abnormality.  Patient was given budesonide 0.5 mg nebulization, ipratropium 0.5 mg nebulization, levalbuterol 1.25 mg nebulization x2, lorazepam 0.5 mg IV, Mag sulfate 2 g IV, methylprednisolone 125 mg IV, 250 mL NS bolus while in the ED. even after nebulization treatments patient was still noted to have increased work of breathing and decreased air movement and was placed on continuous BiPAP.  Patient stayed on BiPAP for about 3 hours according to documented vitals in the ED and seemed to have improved with better air movement throughout lung fields.  Discussed case with ED provider and patient will be placed under observation for further management.

## 2024-12-29 NOTE — ASSESSMENT & PLAN NOTE
- Presenting to the ED with a chief complaint of shortness of breath, found to be in asthma exacerbation  - Multiple nebulization treatments, lorazepam, Mag sulfate, and methylprednisolone given while in the ED.  - DuoNebs Q4H while awake  - Methylprednisolone 125 mg IV b.i.d.  - COVID and flu negative  - Respiratory panel negative  - Pulmonology consulted, appreciate recs  - Respiratory eval and treat Q6H, appreciate recs  - continuous BiPAP for increased work of breathing  - repeat ABG  - if does not improve, she will eventually require intubation

## 2024-12-29 NOTE — ASSESSMENT & PLAN NOTE
Latest blood pressure and vitals reviewed-   Temp:  [97.4 °F (36.3 °C)-98 °F (36.7 °C)]   Pulse:  []   Resp:  [16-56]   BP: (114-154)/()   SpO2:  [86 %-100 %] .     - Currently no antihypertensives on home med list  - PRN hydralazine

## 2024-12-29 NOTE — H&P
[facility] - Emergency Dept  Hospital Medicine  History & Physical    Patient Name: Ifrah Esquivel  MRN: [id number]  Patient Class: OP- Observation  Admission Date: 12/28/2024  Attending Physician: Harika Hammond MD   Primary Care Provider: Laura Primary Doctor         Patient information was obtained from patient, past medical records, and ER records.     Subjective:     Principal Problem:Asthma exacerbation    Chief Complaint:   Chief Complaint   Patient presents with    Shortness of Breath     Patient short of breath x 2 days, hx of asthma and nebulizer not helping nor did breathing treatment at 10am - patient audibly wheezing in triage - sats are 97% but pt has increased wob          HPI: Ms. Esquivel is a 39 yr old female with a hx of obesity, HTN, asthma, tobacco use, bipolar disorder, anxiety, PTSD who presents to ED with a chief complaint of shortness of breath.  Patient states that initially her symptoms began as a dry cough 3 days ago and the next day she began having shortness of breath.  She does endorse being out in the cold weather and rain recently prior to symptom onset.  She states over the last 3 days her symptoms have progressively worsened.  She states that any kind of exertion exacerbates her symptoms.  She also endorses associated wheezing and lightheadedness.  She has tried her home inhalers and nebulizer treatments with minimal relief.  She smokes about half a pack of cigarettes a day and denies alcohol and recreational drug use.  She denies fever, chills, chest pain, abdominal pain, nausea, vomiting, diarrhea, dysuria, hematuria, syncope.    Upon arrival to ED, patient afebrile, HR of 98, RR of 24, BP of 127/94, satting 97% on RA.  Initial labs in the ED unremarkable.  CXR with no acute cardiopulmonary abnormality.  Patient was given budesonide 0.5 mg nebulization, ipratropium 0.5 mg nebulization, levalbuterol 1.25 mg nebulization x2, lorazepam 0.5 mg IV, Mag sulfate 2 g IV,  methylprednisolone 125 mg IV, 250 mL NS bolus while in the ED. even after nebulization treatments patient was still noted to have increased work of breathing and decreased air movement and was placed on continuous BiPAP.  Patient stayed on BiPAP for about 3 hours according to documented vitals in the ED and seemed to have improved with better air movement throughout lung fields.  Discussed case with ED provider and patient will be placed under observation for further management.    Past Medical History:   Diagnosis Date    Anxiety     Asthma     Bipolar affective disorder, current episode manic with psychotic symptoms     Hypertension     Manic bipolar I disorder     Mental health disorder     PTSD (post-traumatic stress disorder)     Substance abuse        Past Surgical History:   Procedure Laterality Date    APPENDECTOMY      c sections      x 3    CHOLECYSTECTOMY      ESOPHAGOGASTRODUODENOSCOPY N/A 9/15/2022    Procedure: EGD (ESOPHAGOGASTRODUODENOSCOPY);  Surgeon: Law Sheriff MD;  Location: [facility];  Service: Endoscopy;  Laterality: N/A;    HYSTERECTOMY      TONSILLECTOMY      tonsils and adenoidectomy         Review of patient's allergies indicates:   Allergen Reactions    Asa [aspirin]     Aspirin Other (See Comments)     avoids due to asthma    Ultram [tramadol]     Ultram [tramadol] Nausea And Vomiting    Zofran [ondansetron hcl (pf)]     Zofran [ondansetron hcl (pf)] Other (See Comments)     Gives migraines       No current facility-administered medications on file prior to encounter.     Current Outpatient Medications on File Prior to Encounter   Medication Sig    albuterol (PROVENTIL) 2.5 mg /3 mL (0.083 %) nebulizer solution Take 3 mLs (2.5 mg total) by nebulization every 6 (six) hours as needed for Wheezing or Shortness of Breath. Rescue    albuterol (VENTOLIN HFA) 90 mcg/actuation inhaler Inhale 2 puffs into the lungs every 6 (six) hours as needed for Wheezing. Rescue    benzonatate  (TESSALON) 100 MG capsule Take 1 capsule (100 mg total) by mouth 3 (three) times daily as needed for Cough.    benzphetamine 50 mg Tab Take 1 tablet by mouth 3 (three) times daily.    cetirizine (ZYRTEC) 10 MG tablet Take 1 tablet (10 mg total) by mouth daily as needed for Allergies or Rhinitis.    COMBIVENT RESPIMAT  mcg/actuation inhaler Inhale 1 puff into the lungs every 4 (four) hours as needed for Shortness of Breath.    cyclobenzaprine (FLEXERIL) 10 MG tablet Take 10 mg by mouth 3 (three) times daily as needed for Muscle spasms.     fluticasone propionate (FLONASE) 50 mcg/actuation nasal spray 1 spray (50 mcg total) by Each Nostril route 2 (two) times daily as needed for Rhinitis or Allergies.    gabapentin (NEURONTIN) 300 MG capsule Take 600 mg by mouth 2 (two) times daily.     ipratropium (ATROVENT) 0.02 % nebulizer solution Take 500 mcg by nebulization 4 (four) times daily.    phentermine (ADIPEX-P) 37.5 mg tablet Take 18.75 mg by mouth 2 (two) times daily.    promethazine-dextromethorphan (PROMETHAZINE-DM) 6.25-15 mg/5 mL Syrp Take 5 mLs by mouth every 4 (four) hours as needed (Cough).    QUEtiapine (SEROQUEL) 300 MG Tab Take 300 mg by mouth every evening.    sumatriptan (IMITREX) 25 MG Tab Take 25 mg by mouth as needed.    lamoTRIgine (LAMICTAL) 25 MG tablet Take 25 mg by mouth once daily. (Patient not taking: Reported on 12/28/2024)    mupirocin (BACTROBAN) 2 % ointment Apply topically 2 (two) times daily. (Patient not taking: Reported on 12/28/2024)    nicotine (NICODERM CQ) 21 mg/24 hr Place 1 patch onto the skin once daily. (Patient not taking: Reported on 12/28/2024)    [DISCONTINUED] albuterol (PROVENTIL/VENTOLIN HFA) 90 mcg/actuation inhaler Inhale 1-2 puffs into the lungs every 6 (six) hours as needed for Wheezing or Shortness of Breath. Rescue     Family History       Problem Relation (Age of Onset)    Cancer Other    Diabetes Paternal Grandmother    No Known Problems Father    Ovarian  cancer Mother          Tobacco Use    Smoking status: Every Day     Current packs/day: 0.50     Types: Cigarettes    Smokeless tobacco: Never   Substance and Sexual Activity    Alcohol use: Never    Drug use: Yes     Types: Marijuana     Comment: daily    Sexual activity: Not on file     Review of Systems   Constitutional:  Negative for chills and fever.   Respiratory:  Positive for cough, shortness of breath and wheezing.    Cardiovascular:  Negative for chest pain.   Gastrointestinal:  Negative for abdominal pain, diarrhea, nausea and vomiting.   Genitourinary:  Negative for dysuria and hematuria.   Neurological:  Positive for dizziness and light-headedness. Negative for syncope.     Objective:     Vital Signs (Most Recent):  Temp: 98.2 °F (36.8 °C) (12/28/24 1234)  Pulse: 83 (12/28/24 1700)  Resp: (!) 23 (12/28/24 1640)  BP: (!) 141/83 (12/28/24 1700)  SpO2: 100 % (12/28/24 1700) Vital Signs (24h Range):  Temp:  [98.2 °F (36.8 °C)] 98.2 °F (36.8 °C)  Pulse:  [81-98] 83  Resp:  [13-33] 23  SpO2:  [97 %-100 %] 100 %  BP: (113-141)/(73-94) 141/83     Weight: 136.1 kg (300 lb)  Body mass index is 53.14 kg/m².     Physical Exam  Vitals reviewed.   Constitutional:       General: She is awake. She is not in acute distress.     Appearance: Normal appearance. She is obese. She is not ill-appearing or diaphoretic.      Comments: Patient still audibly wheezing.   Cardiovascular:      Rate and Rhythm: Normal rate.      Heart sounds: Normal heart sounds. No murmur heard.     No friction rub. No gallop.   Pulmonary:      Effort: Pulmonary effort is normal. No respiratory distress.      Breath sounds: Wheezing (Diffuse expiratory wheezing to bilateral lungs) present.      Comments: Increased work of breathing still noted during interview and exam.  Abdominal:      General: Bowel sounds are normal.      Palpations: Abdomen is soft.      Tenderness: There is no abdominal tenderness. There is no guarding or rebound.    Musculoskeletal:      Right lower le+ Pitting Edema present.      Left lower le+ Pitting Edema present.   Skin:     General: Skin is warm and dry.   Neurological:      Mental Status: She is alert and oriented to person, place, and time.   Psychiatric:         Behavior: Behavior is cooperative.                Significant Labs: All pertinent labs within the past 24 hours have been reviewed.  ABGs:   Recent Labs   Lab 24  1631   PH 7.520*   PCO2 26.9*   HCO3 22.0*   POCSATURATED 100   BE -1   PO2 146*     CBC:   Recent Labs   Lab 24  1421   WBC 10.57   HGB 13.0   HCT 39.7        CMP:   Recent Labs   Lab 24  1421      K 3.8      CO2 23   GLU 93   BUN 12   CREATININE 0.8   CALCIUM 8.8   PROT 6.5   ALBUMIN 3.8   BILITOT 0.4   ALKPHOS 73   AST 14   ALT 18   ANIONGAP 8     Cardiac Markers:   Recent Labs   Lab 24  1421   BNP 13     Magnesium:   Recent Labs   Lab 24  1421   MG 1.9     Troponin:   Recent Labs   Lab 24  1421   TROPONINIHS 6.2       Significant Imaging:   Imaging Results              X-Ray Chest AP Portable (Final result)  Result time 24 13:20:37      Final result by Jillian Collazo MD (24 13:20:37)                   Impression:      No acute cardiopulmonary abnormality.      Electronically signed by: Jillian Collazo  Date:    2024  Time:    13:20               Narrative:    EXAMINATION:  XR CHEST AP PORTABLE    CLINICAL HISTORY:  Asthma;    FINDINGS:  Portable chest at 13:03 hours is compared to 12/15/2024 shows normal cardiomediastinal silhouette.    Lungs are clear. Pulmonary vasculature is normal. No acute osseous abnormality.                                     Assessment/Plan:     * Asthma exacerbation  - Presenting to the ED with a chief complaint of shortness of breath, found to be in asthma exacerbation  - Multiple nebulization treatments, lorazepam, Mag sulfate, and methylprednisolone given while in the ED.  -  DuoNebs Q4H while awake  - Methylprednisolone 60 mg IV BID  - COVID and flu negative  - Respiratory panel pending  - Pulmonology consulted, appreciate recs  - Respiratory eval and treat Q6H, appreciate recs    Essential hypertension  Latest blood pressure and vitals reviewed-   Temp:  [98.2 °F (36.8 °C)]   Pulse:  [81-98]   Resp:  [13-33]   BP: (113-141)/(73-94)   SpO2:  [97 %-100 %] .     - Currently no antihypertensives on home med list  - PRN hydralazine    Morbid obesity  Body mass index is 53.14 kg/m². Morbid obesity complicates all aspects of disease management from diagnostic modalities to treatment.         VTE Risk Mitigation (From admission, onward)           Ordered     enoxaparin injection 40 mg  Every 12 hours         12/28/24 1810     IP VTE HIGH RISK PATIENT  Once         12/28/24 1810     Place sequential compression device  Until discontinued         12/28/24 1810                         On 12/28/2024, patient should be placed in hospital observation services under my care in collaboration with Harika Hammond MD.           RADHA LyC  Department of Hospital Medicine  [facility] - Emergency Dept

## 2024-12-29 NOTE — ASSESSMENT & PLAN NOTE
Body mass index is 66.72 kg/m². Morbid obesity complicates all aspects of disease management from diagnostic modalities to treatment.   Likely has component of obesity hypoventilation syndrome

## 2024-12-29 NOTE — CARE UPDATE
12/29/24 0710   CPAP/BiPAP Settings   Mode Of Delivery BiPAP   Equipment Type V60   Ipap 10   EPAP (cm H2O) 5   Pressure Support (cm H2O) 5   Set Rate (Breaths/Min) 16   Oxygen Concentration (%) 21   RR Total (Breaths/Min) 22   Tidal Volume (mL) 856   Minute Ventilation (L/Min) 19   Peak Inspiratory Pressure (cm H2O) 10   TiTOT (%) 35   Total Leak (L/Min) 9   Patient Trigger - ST Mode Only (%) 99   CPAP/BiPAP Alarms   High Pressure (cm H2O) 40   Low Pressure (cm H2O) 5   High RR (breaths/min) 45   Low RR (breaths/min) 8   Apnea (Sec) 20

## 2024-12-29 NOTE — CONSULTS
12/29/2024      Admit Date: 12/28/2024  Ifrah Esquivel  New Patient Consult    Chief Complaint   Patient presents with    Shortness of Breath     Patient short of breath x 2 days, hx of asthma and nebulizer not helping nor did breathing treatment at 10am - patient audibly wheezing in triage - sats are 97% but pt has increased wob         History of Present Illness:  Pt is a 40 yo female with obesity, HTN, asthma, tobacco use, bipolar disorder, anxiety, PTSD who presents to ED with a chief complaint of shortness of breath. Pulmonary is consulted for asthma exacerbation. Pt has been having cough about 2 days and worsening sob with wheezing, feeling weak and exhausted. She has been using albuterol inhaler and nebs at home but is not on a control inhaler. She has had lifelong asthma, denies intubation for asthma. Currently pt requiring BIPAP.      PFSH:  Past Medical History:   Diagnosis Date    Anxiety     Asthma     Bipolar affective disorder, current episode manic with psychotic symptoms     Hypertension     Manic bipolar I disorder     Mental health disorder     PTSD (post-traumatic stress disorder)     Substance abuse      Past Surgical History:   Procedure Laterality Date    APPENDECTOMY      c sections      x 3    CHOLECYSTECTOMY      ESOPHAGOGASTRODUODENOSCOPY N/A 9/15/2022    Procedure: EGD (ESOPHAGOGASTRODUODENOSCOPY);  Surgeon: Law Sheriff MD;  Location: [facility];  Service: Endoscopy;  Laterality: N/A;    HYSTERECTOMY      TONSILLECTOMY      tonsils and adenoidectomy       Social History     Tobacco Use    Smoking status: Every Day     Current packs/day: 0.50     Types: Cigarettes    Smokeless tobacco: Never   Substance Use Topics    Alcohol use: Never    Drug use: Yes     Types: Marijuana     Comment: daily     Family History   Problem Relation Name Age of Onset    Ovarian cancer Mother      Cancer Other m great gm         breast    No Known Problems Father      Diabetes Paternal Grandmother    "    Review of patient's allergies indicates:   Allergen Reactions    Asa [aspirin]     Aspirin Other (See Comments)     avoids due to asthma    Ultram [tramadol]     Ultram [tramadol] Nausea And Vomiting    Zofran [ondansetron hcl (pf)]     Zofran [ondansetron hcl (pf)] Other (See Comments)     Gives migraines       Performance Status:Performance Status:The patient's activity level is no limits with regular activity.    Review of Systems:  a review of eleven systems covering constitutional, Psych, Eye, HEENT, Respiratory, Cardiac, GI, , Musculoskeletal, Endocrine, Dermatologicwas negative except the above mentioned abnormalities and for any pertinent findings as listed below:  Feeling uncomfortable  Muscle cramps      Exam:Comprehensive exam done. /82   Pulse 87   Temp 97.6 °F (36.4 °C) (Oral)   Resp 17   Ht 5' 4" (1.626 m)   Wt (!) 176.3 kg (388 lb 11.2 oz)   SpO2 95%   Breastfeeding No   BMI 66.72 kg/m²   Exam included Vitals as listed, and patient's appearance and affect and alertness and mood, oral exam for yeast and hygiene and pharynx lesions and Mallapatti (M) score, neck with inspection for jvd and masses and thyroid abnormalities and lymph nodes (supraclavicular and infraclavicular nodes also examined and noted if abn), chest exam included symmetry and effort and fremitus and percussion and auscultation, cardiac exam included rhythm and gallops and murmur and rubs and jvd and edema, abdominal exam for mass and hepatosplenomegaly and tenderness and hernias and bowel sounds, Musculoskeletal exam with muscle tone and posture and mobility/gait and  strenght, and skin for rashes and cyanosis and pallor and turgor, extremity for clubbing.  Findings were normal except as listed below:  Awake, alert, increased work of breathing but no distress, on bipap, sats 94%  Morbidly obese  HR regular  Breath sounds with bilateral loud wheezes  Abd soft nontender  No edema/clubbing  Moves all extremities " and verbalizes  Appears anxious    Radiographs reviewed: view by direct vision   CTA chest 12/29/24- poor contrast bolus but no central PE seen; L lung GGO otherwise clear    Imaging Results              X-Ray Chest AP Portable (Final result)  Result time 12/28/24 13:20:37      Final result by Jillian Collazo MD (12/28/24 13:20:37)                   Impression:      No acute cardiopulmonary abnormality.      Electronically signed by: Jillian Collazo  Date:    12/28/2024  Time:    13:20               Narrative:    EXAMINATION:  XR CHEST AP PORTABLE    CLINICAL HISTORY:  Asthma;    FINDINGS:  Portable chest at 13:03 hours is compared to 12/15/2024 shows normal cardiomediastinal silhouette.    Lungs are clear. Pulmonary vasculature is normal. No acute osseous abnormality.                                          Labs     Recent Labs   Lab 12/29/24  0439   WBC 16.18*   HGB 12.9   HCT 39.2        Recent Labs   Lab 12/28/24  1421 12/29/24  0439    138   K 3.8 3.9    108   CO2 23 22*   BUN 12 16   CREATININE 0.8 0.8   GLU 93 222*   CALCIUM 8.8 8.9   MG 1.9 2.0   PHOS  --  1.4*   AST 14  --    ALT 18  --    ALKPHOS 73  --    BILITOT 0.4  --    PROT 6.5  --    ALBUMIN 3.8  --    BNP 13  --      Recent Labs   Lab 12/28/24  1631   PH 7.520*   PCO2 26.9*   PO2 146*   HCO3 22.0*     Microbiology Results (last 7 days)       Procedure Component Value Units Date/Time    Respiratory Infection Panel (PCR), Nasopharyngeal [[id number]] Collected: 12/28/24 1943    Order Status: Completed Specimen: Nasopharyngeal Swab Updated: 12/28/24 2116     Respiratory Infection Panel Source NP swab     Adenovirus Not Detected     Coronavirus 229E, Common Cold Virus Not Detected     Coronavirus HKU1, Common Cold Virus Not Detected     Coronavirus NL63, Common Cold Virus Not Detected     Coronavirus OC43, Common Cold Virus Not Detected     Comment: Coronavirus strains 229E, HKU1, NL63, and OC43 can cause the common   cold   and  are not associated with the respiratory disease outbreak caused   by  the COVID-19 (SARS-CoV-2 novel Coronavirus) strain.           SARS-CoV2 (COVID-19) Qualitative PCR Not Detected     Human Metapneumovirus Not Detected     Human Rhinovirus/Enterovirus Not Detected     Influenza A (subtypes H1, H1-2009,H3) Not Detected     Influenza B Not Detected     Parainfluenza Virus 1 Not Detected     Parainfluenza Virus 2 Not Detected     Parainfluenza Virus 3 Not Detected     Parainfluenza Virus 4 Not Detected     Respiratory Syncytial Virus Not Detected     Bordetella Parapertussis (PD4933) Not Detected     Bordetella pertussis (ptxP) Not Detected     Chlamydia pneumoniae Not Detected     Mycoplasma pneumoniae Not Detected     Comment: Respiratory Infection Panel testing performed by Multiplex PCR.       Narrative:      Respiratory Infection Panel source->NP Swab    Respiratory Infection Panel (PCR), Nasopharyngeal [[id number]]     Order Status: No result Specimen: Nasopharyngeal Swab             Impression:  Active Hospital Problems    Diagnosis  POA    *Asthma exacerbation [J45.901]  Yes    Morbid obesity [E66.01]  Yes    Essential hypertension [I10]  Yes      Resolved Hospital Problems   No resolved problems to display.               Plan:       - continue bipap  - continue steroids  - continue nebulized bronchodilators- q4h with q2h prn  - adding stat 5mg albuterol x1 and mag sulfate 2gm x1  - must have asthma control inhaler started on discharge  - weight loss recommended    Denise Garcia MD  Pulmonary & Critical Care Medicine

## 2024-12-29 NOTE — ASSESSMENT & PLAN NOTE
-likely secondary to asthma exacerbation, treat as above  -continuous BiPAP support   -repeat ABG   -pulmonology consult, appreciate their recommendations  -2D echocardiogram ordered given bilateral lower extremity edema

## 2024-12-29 NOTE — CARE UPDATE
12/29/24 1143   CPAP/BiPAP Settings   Mode Of Delivery BiPAP   Equipment Type V60   Ipap 10   EPAP (cm H2O) 5   Pressure Support (cm H2O) 5   Set Rate (Breaths/Min) 16   Oxygen Concentration (%) 21   RR Total (Breaths/Min) 21   Tidal Volume (mL) 952   Minute Ventilation (L/Min) 23   Peak Inspiratory Pressure (cm H2O) 11   TiTOT (%) 25   Total Leak (L/Min) 10   Patient Trigger - ST Mode Only (%) 94   CPAP/BiPAP Alarms   High Pressure (cm H2O) 40   Low Pressure (cm H2O) 5   High RR (breaths/min) 45   Low RR (breaths/min) 8   Apnea (Sec) 20        (4) excellent

## 2024-12-29 NOTE — ASSESSMENT & PLAN NOTE
Body mass index is 53.14 kg/m². Morbid obesity complicates all aspects of disease management from diagnostic modalities to treatment.

## 2024-12-29 NOTE — HOSPITAL COURSE
Ms. Esquivel is a 33-year-old female with history of obesity, asthma, tobacco use, anxiety presented with chief complaint of shortness of breath.  She was admitted for severe asthma exacerbation requiring IV magnesium , and nebulizations multiple times.  She required continue BiPAP, was continued on IV steroids and pulmonology was consulted.  Eventually her respiratory status improved, required BiPAP intermittently.  She also had edema of her hands and legs for which 2D echocardiogram obtained that showed preserved EF and normal diastolic function.  Bilateral lower extremity ultrasound was negative for DVT.  D-dimer was elevated, given her presentation with hypoxia and shortness of breaths, CTA PE protocol was ordered that was negative for pulmonary embolism.  With IV steroids, she continued to have anasarca for which IV Lasix was started and she responded well.  Steroids were weaned as tolerated, her respiratory status significantly improved.  Pulmonology cleared her for discharge on a steroid taper, Breo Ellipta daily, with follow up with him on phone call within 1 week, outpatient/home sleep studies after this exacerbation resolves.  On the day of discharge, her shortness of breath significantly improved, on home oxygen eval she was hypoxic and did qualify for home oxygen.  Case management was consulted who arranged oxygen for home.  She complained of sore throat, on exam she had pus like material in the pharynx, strep culture negative, nasopharyngeal panel negative, sore throat improved with topical lidocaine.  Given the concern of pus and concern of upper airway closure as per Pulmonary, CT was obtained that was negative for peritonsillar abscess/retropharyngeal abscess.  It showed findings concerning for sinusitis, likely that is causing postnasal drip causing her sore throat/slight pus or mucus, she was prescribed to complete Augmentin for a total of 5 days.  She will be discharged on a prednisone taper, asthma  controller therapy, Lasix, Augmentin, topical lidocaine spray for sore throat.  Case management set up follow up with however discharge clinic in 7 days.  She will also call pulmonology office in the next week.  All instructions explained at bedside and she verbalized understanding.

## 2024-12-29 NOTE — SUBJECTIVE & OBJECTIVE
Past Medical History:   Diagnosis Date    Anxiety     Asthma     Bipolar affective disorder, current episode manic with psychotic symptoms     Hypertension     Manic bipolar I disorder     Mental health disorder     PTSD (post-traumatic stress disorder)     Substance abuse        Past Surgical History:   Procedure Laterality Date    APPENDECTOMY      c sections      x 3    CHOLECYSTECTOMY      ESOPHAGOGASTRODUODENOSCOPY N/A 9/15/2022    Procedure: EGD (ESOPHAGOGASTRODUODENOSCOPY);  Surgeon: Law Sheriff MD;  Location: [facility];  Service: Endoscopy;  Laterality: N/A;    HYSTERECTOMY      TONSILLECTOMY      tonsils and adenoidectomy         Review of patient's allergies indicates:   Allergen Reactions    Asa [aspirin]     Aspirin Other (See Comments)     avoids due to asthma    Ultram [tramadol]     Ultram [tramadol] Nausea And Vomiting    Zofran [ondansetron hcl (pf)]     Zofran [ondansetron hcl (pf)] Other (See Comments)     Gives migraines       No current facility-administered medications on file prior to encounter.     Current Outpatient Medications on File Prior to Encounter   Medication Sig    albuterol (PROVENTIL) 2.5 mg /3 mL (0.083 %) nebulizer solution Take 3 mLs (2.5 mg total) by nebulization every 6 (six) hours as needed for Wheezing or Shortness of Breath. Rescue    albuterol (VENTOLIN HFA) 90 mcg/actuation inhaler Inhale 2 puffs into the lungs every 6 (six) hours as needed for Wheezing. Rescue    benzonatate (TESSALON) 100 MG capsule Take 1 capsule (100 mg total) by mouth 3 (three) times daily as needed for Cough.    benzphetamine 50 mg Tab Take 1 tablet by mouth 3 (three) times daily.    cetirizine (ZYRTEC) 10 MG tablet Take 1 tablet (10 mg total) by mouth daily as needed for Allergies or Rhinitis.    COMBIVENT RESPIMAT  mcg/actuation inhaler Inhale 1 puff into the lungs every 4 (four) hours as needed for Shortness of Breath.    cyclobenzaprine (FLEXERIL) 10 MG tablet Take 10 mg by mouth  3 (three) times daily as needed for Muscle spasms.     fluticasone propionate (FLONASE) 50 mcg/actuation nasal spray 1 spray (50 mcg total) by Each Nostril route 2 (two) times daily as needed for Rhinitis or Allergies.    gabapentin (NEURONTIN) 300 MG capsule Take 600 mg by mouth 2 (two) times daily.     ipratropium (ATROVENT) 0.02 % nebulizer solution Take 500 mcg by nebulization 4 (four) times daily.    phentermine (ADIPEX-P) 37.5 mg tablet Take 18.75 mg by mouth 2 (two) times daily.    promethazine-dextromethorphan (PROMETHAZINE-DM) 6.25-15 mg/5 mL Syrp Take 5 mLs by mouth every 4 (four) hours as needed (Cough).    QUEtiapine (SEROQUEL) 300 MG Tab Take 300 mg by mouth every evening.    sumatriptan (IMITREX) 25 MG Tab Take 25 mg by mouth as needed.    lamoTRIgine (LAMICTAL) 25 MG tablet Take 25 mg by mouth once daily. (Patient not taking: Reported on 12/28/2024)    mupirocin (BACTROBAN) 2 % ointment Apply topically 2 (two) times daily. (Patient not taking: Reported on 12/28/2024)    nicotine (NICODERM CQ) 21 mg/24 hr Place 1 patch onto the skin once daily. (Patient not taking: Reported on 12/28/2024)    [DISCONTINUED] albuterol (PROVENTIL/VENTOLIN HFA) 90 mcg/actuation inhaler Inhale 1-2 puffs into the lungs every 6 (six) hours as needed for Wheezing or Shortness of Breath. Rescue     Family History       Problem Relation (Age of Onset)    Cancer Other    Diabetes Paternal Grandmother    No Known Problems Father    Ovarian cancer Mother          Tobacco Use    Smoking status: Every Day     Current packs/day: 0.50     Types: Cigarettes    Smokeless tobacco: Never   Substance and Sexual Activity    Alcohol use: Never    Drug use: Yes     Types: Marijuana     Comment: daily    Sexual activity: Not on file     Review of Systems   Constitutional:  Negative for chills and fever.   Respiratory:  Positive for cough, shortness of breath and wheezing.    Cardiovascular:  Negative for chest pain.   Gastrointestinal:   Negative for abdominal pain, diarrhea, nausea and vomiting.   Genitourinary:  Negative for dysuria and hematuria.   Neurological:  Positive for dizziness and light-headedness. Negative for syncope.     Objective:     Vital Signs (Most Recent):  Temp: 98.2 °F (36.8 °C) (24 1234)  Pulse: 83 (24 1700)  Resp: (!) 23 (24 1640)  BP: (!) 141/83 (24 1700)  SpO2: 100 % (24 1700) Vital Signs (24h Range):  Temp:  [98.2 °F (36.8 °C)] 98.2 °F (36.8 °C)  Pulse:  [81-98] 83  Resp:  [13-33] 23  SpO2:  [97 %-100 %] 100 %  BP: (113-141)/(73-94) 141/83     Weight: 136.1 kg (300 lb)  Body mass index is 53.14 kg/m².     Physical Exam  Vitals reviewed.   Constitutional:       General: She is awake. She is not in acute distress.     Appearance: Normal appearance. She is obese. She is not ill-appearing or diaphoretic.      Comments: Patient still audibly wheezing.   Cardiovascular:      Rate and Rhythm: Normal rate.      Heart sounds: Normal heart sounds. No murmur heard.     No friction rub. No gallop.   Pulmonary:      Effort: Pulmonary effort is normal. No respiratory distress.      Breath sounds: Wheezing (Diffuse expiratory wheezing to bilateral lungs) present.      Comments: Increased work of breathing still noted during interview and exam.  Abdominal:      General: Bowel sounds are normal.      Palpations: Abdomen is soft.      Tenderness: There is no abdominal tenderness. There is no guarding or rebound.   Musculoskeletal:      Right lower le+ Pitting Edema present.      Left lower le+ Pitting Edema present.   Skin:     General: Skin is warm and dry.   Neurological:      Mental Status: She is alert and oriented to person, place, and time.   Psychiatric:         Behavior: Behavior is cooperative.                Significant Labs: All pertinent labs within the past 24 hours have been reviewed.  ABGs:   Recent Labs   Lab 24  1631   PH 7.520*   PCO2 26.9*   HCO3 22.0*   POCSATURATED 100   BE  -1   PO2 146*     CBC:   Recent Labs   Lab 12/28/24  1421   WBC 10.57   HGB 13.0   HCT 39.7        CMP:   Recent Labs   Lab 12/28/24  1421      K 3.8      CO2 23   GLU 93   BUN 12   CREATININE 0.8   CALCIUM 8.8   PROT 6.5   ALBUMIN 3.8   BILITOT 0.4   ALKPHOS 73   AST 14   ALT 18   ANIONGAP 8     Cardiac Markers:   Recent Labs   Lab 12/28/24  1421   BNP 13     Magnesium:   Recent Labs   Lab 12/28/24  1421   MG 1.9     Troponin:   Recent Labs   Lab 12/28/24  1421   TROPONINIHS 6.2       Significant Imaging:   Imaging Results              X-Ray Chest AP Portable (Final result)  Result time 12/28/24 13:20:37      Final result by Jillian Collazo MD (12/28/24 13:20:37)                   Impression:      No acute cardiopulmonary abnormality.      Electronically signed by: Jillian Collazo  Date:    12/28/2024  Time:    13:20               Narrative:    EXAMINATION:  XR CHEST AP PORTABLE    CLINICAL HISTORY:  Asthma;    FINDINGS:  Portable chest at 13:03 hours is compared to 12/15/2024 shows normal cardiomediastinal silhouette.    Lungs are clear. Pulmonary vasculature is normal. No acute osseous abnormality.

## 2024-12-29 NOTE — PROGRESS NOTES
[facility]  Progress Note    Patient Name: Ifrah Esquivel  MRN: [id number]  Patient Class: IP- Inpatient   Admission Date: 12/28/2024  Length of Stay: 0 days  Attending Physician: Loly Kim, *  Primary Care Provider: No, Primary Doctor        Subjective     Principal Problem:Asthma exacerbation        HPI:  Ms. Esquivel is a 39 yr old female with a hx of obesity, HTN, asthma, tobacco use, bipolar disorder, anxiety, PTSD who presents to ED with a chief complaint of shortness of breath.  Patient states that initially her symptoms began as a dry cough 3 days ago and the next day she began having shortness of breath.  She does endorse being out in the cold weather and rain recently prior to symptom onset.  She states over the last 3 days her symptoms have progressively worsened.  She states that any kind of exertion exacerbates her symptoms.  She also endorses associated wheezing and lightheadedness.  She has tried her home inhalers and nebulizer treatments with minimal relief.  She smokes about half a pack of cigarettes a day and denies alcohol and recreational drug use.  She denies fever, chills, chest pain, abdominal pain, nausea, vomiting, diarrhea, dysuria, hematuria, syncope.    Upon arrival to ED, patient afebrile, HR of 98, RR of 24, BP of 127/94, satting 97% on RA.  Initial labs in the ED unremarkable.  CXR with no acute cardiopulmonary abnormality.  Patient was given budesonide 0.5 mg nebulization, ipratropium 0.5 mg nebulization, levalbuterol 1.25 mg nebulization x2, lorazepam 0.5 mg IV, Mag sulfate 2 g IV, methylprednisolone 125 mg IV, 250 mL NS bolus while in the ED. even after nebulization treatments patient was still noted to have increased work of breathing and decreased air movement and was placed on continuous BiPAP.  Patient stayed on BiPAP for about 3 hours according to documented vitals in the ED and seemed to have improved with better air movement throughout  lung fields.  Discussed case with ED provider and patient will be placed under observation for further management.    Overview/Hospital Course:  Ms. Esquivel is a 33-year-old female with history of obesity, asthma, tobacco use, anxiety presented with chief complaint shortness of breath.  She was admitted for likely asthma exacerbation .  She required continue BiPAP, was continued on IV steroids and pulmonology was consulted.    Interval History:  Patient is seen and examined at bedside.    Patient had increased work of breathing this morning.  Put her back on BiPAP and her respiratory status improved.  Repeat ABG ordered, patient refused.    She also had anxiety and pain.  We 1 mg Ativan given, improved.    IV pain medications ordered while NPO on BiPAP    No chest pain, no other symptoms.  She is a full code and understands about intubation if needed.    Review of Systems  Objective:     Vital Signs (Most Recent):  Temp: 97.4 °F (36.3 °C) (12/29/24 1125)  Pulse: 88 (12/29/24 1442)  Resp: (!) 22 (12/29/24 1442)  BP: (!) 143/102 (12/29/24 1301)  SpO2: (!) 94 % (12/29/24 1442) Vital Signs (24h Range):  Temp:  [97.4 °F (36.3 °C)-98 °F (36.7 °C)] 97.4 °F (36.3 °C)  Pulse:  [] 88  Resp:  [16-56] 22  SpO2:  [86 %-100 %] 94 %  BP: (114-154)/() 143/102     Weight: (!) 176.3 kg (388 lb 11.2 oz)  Body mass index is 66.72 kg/m².    Intake/Output Summary (Last 24 hours) at 12/29/2024 1459  Last data filed at 12/29/2024 1418  Gross per 24 hour   Intake 530.88 ml   Output --   Net 530.88 ml         Physical Exam  Vitals reviewed.   Constitutional:       General: She is awake. She is not in acute distress.     Appearance: Normal appearance. She is obese. She is not ill-appearing or diaphoretic.      Comments: Patient still audibly wheezing.   Cardiovascular:      Rate and Rhythm: Normal rate.      Heart sounds: Normal heart sounds. No murmur heard.     No friction rub. No gallop.   Pulmonary:      Effort: Pulmonary effort  is normal. No respiratory distress.      Breath sounds: Wheezing (Diffuse expiratory wheezing to bilateral lungs) present.      Comments: Increased work of breathing still noted during interview and exam.  Abdominal:      General: Bowel sounds are normal.      Palpations: Abdomen is soft.      Tenderness: There is no abdominal tenderness. There is no guarding or rebound.   Musculoskeletal:      Right lower le+ Pitting Edema present.      Left lower le+ Pitting Edema present.   Skin:     General: Skin is warm and dry.   Neurological:      Mental Status: She is alert and oriented to person, place, and time.   Psychiatric:         Behavior: Behavior is cooperative.             Significant Labs: All pertinent labs within the past 24 hours have been reviewed.    Significant Imaging: I have reviewed all pertinent imaging results/findings within the past 24 hours.    Assessment and Plan     * Asthma exacerbation  - Presenting to the ED with a chief complaint of shortness of breath, found to be in asthma exacerbation  - Multiple nebulization treatments, lorazepam, Mag sulfate, and methylprednisolone given while in the ED.  - DuoNebs Q4H while awake  - Methylprednisolone 125 mg IV b.i.d.  - COVID and flu negative  - Respiratory panel negative  - Pulmonology consulted, appreciate recs  - Respiratory eval and treat Q6H, appreciate recs  - continuous BiPAP for increased work of breathing  - repeat ABG  - if does not improve, she will eventually require intubation    Swelling of lower extremity  - duplex venous ultrasound given pain and swelling, concern of DVT  - obesity is high-risk  - BNP normal  - follow up ECHO      Pleuritic chest pain  - given lower extremity swelling, high probability of PE, CTA PE protocol  - telemetry   -follow up 2D echocardiogram   -troponins negative x2      Acute hypoxic respiratory failure  -likely secondary to asthma exacerbation, treat as above  -continuous BiPAP support   -repeat ABG    -pulmonology consult, appreciate their recommendations  -2D echocardiogram ordered given bilateral lower extremity edema      Essential hypertension  Latest blood pressure and vitals reviewed-   Temp:  [97.4 °F (36.3 °C)-98 °F (36.7 °C)]   Pulse:  []   Resp:  [16-56]   BP: (114-154)/()   SpO2:  [86 %-100 %] .     - Currently no antihypertensives on home med list  - PRN hydralazine    Morbid obesity  Body mass index is 66.72 kg/m². Morbid obesity complicates all aspects of disease management from diagnostic modalities to treatment.   Likely has component of obesity hypoventilation syndrome        VTE Risk Mitigation (From admission, onward)           Ordered     enoxaparin injection 40 mg  Every 12 hours         12/28/24 1810     IP VTE HIGH RISK PATIENT  Once         12/28/24 1810     Place sequential compression device  Until discontinued         12/28/24 1810                    Discharge Planning   NATALIE:      Code Status: Full Code   Medical Readiness for Discharge Date:                            Loly Kim MD  Department of Hospital Medicine   [facility]

## 2024-12-29 NOTE — ASSESSMENT & PLAN NOTE
Latest blood pressure and vitals reviewed-   Temp:  [98.2 °F (36.8 °C)]   Pulse:  [81-98]   Resp:  [13-33]   BP: (113-141)/(73-94)   SpO2:  [97 %-100 %] .     - Currently no antihypertensives on home med list  - PRN hydralazine

## 2024-12-29 NOTE — SUBJECTIVE & OBJECTIVE
Interval History:  Patient is seen and examined at bedside.    Patient had increased work of breathing this morning.  Put her back on BiPAP and her respiratory status improved.  Repeat ABG ordered, patient refused.    She also had anxiety and pain.  We 1 mg Ativan given, improved.    IV pain medications ordered while NPO on BiPAP    No chest pain, no other symptoms.  She is a full code and understands about intubation if needed.    Review of Systems  Objective:     Vital Signs (Most Recent):  Temp: 97.4 °F (36.3 °C) (24 1125)  Pulse: 88 (24 1442)  Resp: (!) 22 (24 1442)  BP: (!) 143/102 (24 1301)  SpO2: (!) 94 % (24 1442) Vital Signs (24h Range):  Temp:  [97.4 °F (36.3 °C)-98 °F (36.7 °C)] 97.4 °F (36.3 °C)  Pulse:  [] 88  Resp:  [16-56] 22  SpO2:  [86 %-100 %] 94 %  BP: (114-154)/() 143/102     Weight: (!) 176.3 kg (388 lb 11.2 oz)  Body mass index is 66.72 kg/m².    Intake/Output Summary (Last 24 hours) at 2024 1459  Last data filed at 2024 1418  Gross per 24 hour   Intake 530.88 ml   Output --   Net 530.88 ml         Physical Exam  Vitals reviewed.   Constitutional:       General: She is awake. She is not in acute distress.     Appearance: Normal appearance. She is obese. She is not ill-appearing or diaphoretic.      Comments: Patient still audibly wheezing.   Cardiovascular:      Rate and Rhythm: Normal rate.      Heart sounds: Normal heart sounds. No murmur heard.     No friction rub. No gallop.   Pulmonary:      Effort: Pulmonary effort is normal. No respiratory distress.      Breath sounds: Wheezing (Diffuse expiratory wheezing to bilateral lungs) present.      Comments: Increased work of breathing still noted during interview and exam.  Abdominal:      General: Bowel sounds are normal.      Palpations: Abdomen is soft.      Tenderness: There is no abdominal tenderness. There is no guarding or rebound.   Musculoskeletal:      Right lower le+ Pitting  Edema present.      Left lower le+ Pitting Edema present.   Skin:     General: Skin is warm and dry.   Neurological:      Mental Status: She is alert and oriented to person, place, and time.   Psychiatric:         Behavior: Behavior is cooperative.             Significant Labs: All pertinent labs within the past 24 hours have been reviewed.    Significant Imaging: I have reviewed all pertinent imaging results/findings within the past 24 hours.

## 2024-12-30 LAB
ALLENS TEST: ABNORMAL
ANION GAP SERPL CALC-SCNC: 6 MMOL/L (ref 8–16)
BASOPHILS # BLD AUTO: 0.04 K/UL (ref 0–0.2)
BASOPHILS NFR BLD: 0.2 % (ref 0–1.9)
BUN SERPL-MCNC: 19 MG/DL (ref 6–20)
CALCIUM SERPL-MCNC: 8.9 MG/DL (ref 8.7–10.5)
CHLORIDE SERPL-SCNC: 107 MMOL/L (ref 95–110)
CO2 SERPL-SCNC: 24 MMOL/L (ref 23–29)
CREAT SERPL-MCNC: 0.7 MG/DL (ref 0.5–1.4)
DELSYS: ABNORMAL
DIFFERENTIAL METHOD BLD: ABNORMAL
EOSINOPHIL # BLD AUTO: 0 K/UL (ref 0–0.5)
EOSINOPHIL NFR BLD: 0 % (ref 0–8)
ERYTHROCYTE [DISTWIDTH] IN BLOOD BY AUTOMATED COUNT: 14.9 % (ref 11.5–14.5)
EST. GFR  (NO RACE VARIABLE): >60 ML/MIN/1.73 M^2
FLOW: 3
GLUCOSE SERPL-MCNC: 166 MG/DL (ref 70–110)
HCO3 UR-SCNC: 22 MMOL/L (ref 24–28)
HCT VFR BLD AUTO: 39.2 % (ref 37–48.5)
HGB BLD-MCNC: 12.8 G/DL (ref 12–16)
IMM GRANULOCYTES # BLD AUTO: 0.22 K/UL (ref 0–0.04)
IMM GRANULOCYTES NFR BLD AUTO: 0.9 % (ref 0–0.5)
LYMPHOCYTES # BLD AUTO: 1.7 K/UL (ref 1–4.8)
LYMPHOCYTES NFR BLD: 6.7 % (ref 18–48)
MAGNESIUM SERPL-MCNC: 2.1 MG/DL (ref 1.6–2.6)
MCH RBC QN AUTO: 30.3 PG (ref 27–31)
MCHC RBC AUTO-ENTMCNC: 32.7 G/DL (ref 32–36)
MCV RBC AUTO: 93 FL (ref 82–98)
MODE: ABNORMAL
MONOCYTES # BLD AUTO: 0.5 K/UL (ref 0.3–1)
MONOCYTES NFR BLD: 2.1 % (ref 4–15)
NEUTROPHILS # BLD AUTO: 23.3 K/UL (ref 1.8–7.7)
NEUTROPHILS NFR BLD: 90.1 % (ref 38–73)
NRBC BLD-RTO: 0 /100 WBC
PCO2 BLDA: 36.5 MMHG (ref 35–45)
PH SMN: 7.39 [PH] (ref 7.35–7.45)
PHOSPHATE SERPL-MCNC: 2.7 MG/DL (ref 2.7–4.5)
PLATELET # BLD AUTO: 342 K/UL (ref 150–450)
PMV BLD AUTO: 9.7 FL (ref 9.2–12.9)
PO2 BLDA: 96 MMHG (ref 80–100)
POC BE: -3 MMOL/L
POC SATURATED O2: 97 % (ref 95–100)
POC TCO2: 23 MMOL/L (ref 23–27)
POTASSIUM SERPL-SCNC: 4 MMOL/L (ref 3.5–5.1)
RBC # BLD AUTO: 4.23 M/UL (ref 4–5.4)
SAMPLE: ABNORMAL
SITE: ABNORMAL
SODIUM SERPL-SCNC: 137 MMOL/L (ref 136–145)
SP02: 94
WBC # BLD AUTO: 25.77 K/UL (ref 3.9–12.7)

## 2024-12-30 PROCEDURE — 99900031 HC PATIENT EDUCATION (STAT)

## 2024-12-30 PROCEDURE — 94761 N-INVAS EAR/PLS OXIMETRY MLT: CPT

## 2024-12-30 PROCEDURE — 80048 BASIC METABOLIC PNL TOTAL CA: CPT

## 2024-12-30 PROCEDURE — 21000000 HC CCU ICU ROOM CHARGE

## 2024-12-30 PROCEDURE — 05HY33Z INSERTION OF INFUSION DEVICE INTO UPPER VEIN, PERCUTANEOUS APPROACH: ICD-10-PCS

## 2024-12-30 PROCEDURE — 25000242 PHARM REV CODE 250 ALT 637 W/ HCPCS

## 2024-12-30 PROCEDURE — 36415 COLL VENOUS BLD VENIPUNCTURE: CPT

## 2024-12-30 PROCEDURE — C1751 CATH, INF, PER/CENT/MIDLINE: HCPCS

## 2024-12-30 PROCEDURE — 94640 AIRWAY INHALATION TREATMENT: CPT

## 2024-12-30 PROCEDURE — 94660 CPAP INITIATION&MGMT: CPT

## 2024-12-30 PROCEDURE — 99233 SBSQ HOSP IP/OBS HIGH 50: CPT | Mod: ,,, | Performed by: INTERNAL MEDICINE

## 2024-12-30 PROCEDURE — 83735 ASSAY OF MAGNESIUM: CPT

## 2024-12-30 PROCEDURE — 63600175 PHARM REV CODE 636 W HCPCS: Performed by: INTERNAL MEDICINE

## 2024-12-30 PROCEDURE — 63600175 PHARM REV CODE 636 W HCPCS

## 2024-12-30 PROCEDURE — 36600 WITHDRAWAL OF ARTERIAL BLOOD: CPT

## 2024-12-30 PROCEDURE — 36406 VNPNXR<3YRS PHY/QHP OTHER VN: CPT

## 2024-12-30 PROCEDURE — 85025 COMPLETE CBC W/AUTO DIFF WBC: CPT

## 2024-12-30 PROCEDURE — 84100 ASSAY OF PHOSPHORUS: CPT

## 2024-12-30 PROCEDURE — 25000003 PHARM REV CODE 250

## 2024-12-30 PROCEDURE — 99900035 HC TECH TIME PER 15 MIN (STAT)

## 2024-12-30 PROCEDURE — 82803 BLOOD GASES ANY COMBINATION: CPT

## 2024-12-30 PROCEDURE — 27100171 HC OXYGEN HIGH FLOW UP TO 24 HOURS

## 2024-12-30 RX ORDER — SODIUM CHLORIDE 0.9 % (FLUSH) 0.9 %
10 SYRINGE (ML) INJECTION EVERY 12 HOURS PRN
Status: DISCONTINUED | OUTPATIENT
Start: 2024-12-30 | End: 2025-01-02 | Stop reason: HOSPADM

## 2024-12-30 RX ORDER — ENOXAPARIN SODIUM 100 MG/ML
60 INJECTION SUBCUTANEOUS EVERY 12 HOURS
Status: DISCONTINUED | OUTPATIENT
Start: 2024-12-30 | End: 2025-01-02 | Stop reason: HOSPADM

## 2024-12-30 RX ADMIN — IPRATROPIUM BROMIDE AND ALBUTEROL SULFATE 3 ML: .5; 3 SOLUTION RESPIRATORY (INHALATION) at 08:12

## 2024-12-30 RX ADMIN — METHYLPREDNISOLONE SODIUM SUCCINATE 125 MG: 125 INJECTION, POWDER, FOR SOLUTION INTRAMUSCULAR; INTRAVENOUS at 03:12

## 2024-12-30 RX ADMIN — IPRATROPIUM BROMIDE AND ALBUTEROL SULFATE 3 ML: .5; 3 SOLUTION RESPIRATORY (INHALATION) at 03:12

## 2024-12-30 RX ADMIN — METHYLPREDNISOLONE SODIUM SUCCINATE 80 MG: 40 INJECTION, POWDER, FOR SOLUTION INTRAMUSCULAR; INTRAVENOUS at 11:12

## 2024-12-30 RX ADMIN — GUAIFENESIN 200 MG: 200 SOLUTION ORAL at 05:12

## 2024-12-30 RX ADMIN — IPRATROPIUM BROMIDE AND ALBUTEROL SULFATE 3 ML: .5; 3 SOLUTION RESPIRATORY (INHALATION) at 12:12

## 2024-12-30 RX ADMIN — LORAZEPAM 1 MG: 2 INJECTION INTRAMUSCULAR; INTRAVENOUS at 09:12

## 2024-12-30 RX ADMIN — MORPHINE SULFATE 1 MG: 2 INJECTION, SOLUTION INTRAMUSCULAR; INTRAVENOUS at 11:12

## 2024-12-30 RX ADMIN — MORPHINE SULFATE 1 MG: 2 INJECTION, SOLUTION INTRAMUSCULAR; INTRAVENOUS at 04:12

## 2024-12-30 RX ADMIN — METHYLPREDNISOLONE SODIUM SUCCINATE 80 MG: 40 INJECTION, POWDER, FOR SOLUTION INTRAMUSCULAR; INTRAVENOUS at 09:12

## 2024-12-30 RX ADMIN — ENOXAPARIN SODIUM 60 MG: 60 INJECTION SUBCUTANEOUS at 09:12

## 2024-12-30 RX ADMIN — QUETIAPINE 300 MG: 100 TABLET ORAL at 09:12

## 2024-12-30 RX ADMIN — MORPHINE SULFATE 1 MG: 2 INJECTION, SOLUTION INTRAMUSCULAR; INTRAVENOUS at 09:12

## 2024-12-30 NOTE — RESPIRATORY THERAPY
12/29/24 2012   Patient Assessment/Suction   Level of Consciousness (AVPU) alert   Respiratory Effort Normal;Unlabored   Expansion/Accessory Muscles/Retractions no retractions;no use of accessory muscles   All Lung Fields Breath Sounds coarse   Rhythm/Pattern, Respiratory unlabored   Cough Frequency no cough   Skin Integrity   $ Wound Care Tech Time 15 min   Area Observed Bridge of nose   Skin Appearance without discoloration   Barrier used? Liquid Filled Cushion   PRE-TX-O2   Device (Oxygen Therapy) BIPAP   Oxygen Concentration (%) 21   SpO2 95 %   Pulse Oximetry Type Continuous   $ Pulse Oximetry - Multiple Charge Pulse Oximetry - Multiple   Pulse (!) 0   Resp (!) 24   Aerosol Therapy   $ Aerosol Therapy Charges Aerosol Treatment   Daily Review of Necessity (SVN) completed   Respiratory Treatment Status (SVN) given   Treatment Route (SVN) in-line   Patient Position HOB elevated   Post Treatment Assessment (SVN) breath sounds unchanged   Signs of Intolerance (SVN) none   Ready to Wean/Extubation Screen   FIO2<=50 (chart decimal) 0.21   Preset CPAP/BiPAP Settings   Mode Of Delivery BiPAP   CPAP/BIPAP charged w/in last 24 h YES   $ Is patient using? Yes   Size of Mask Medium/Large   Sized Appropriately? Yes   Equipment Type V60   Airway Device Type medium full face mask   Ipap 10   EPAP (cm H2O) 5   Pressure Support (cm H2O) 5   Set Rate (Breaths/Min) 16   ITime (sec) 1   Rise Time (sec) 3   Patient CPAP/BiPAP Settings   RR Total (Breaths/Min) 20   Tidal Volume (mL) 981   VE Minute Ventilation (L/min) 19.3 L/min   Peak Inspiratory Pressure (cm H2O) 13   TiTOT (%) 33   Total Leak (L/Min) 47   Patient Trigger - ST Mode Only (%) 99   Education   $ Education BiPAP;Bronchodilator;Oxygen;15 min

## 2024-12-30 NOTE — ASSESSMENT & PLAN NOTE
Latest blood pressure and vitals reviewed-   Temp:  [97.3 °F (36.3 °C)-98.5 °F (36.9 °C)]   Pulse:  [0-103]   Resp:  [16-31]   BP: (119-159)/()   SpO2:  [88 %-96 %] .     - Currently no antihypertensives on home med list  - PRN hydralazine

## 2024-12-30 NOTE — ASSESSMENT & PLAN NOTE
- likely secondary to her acute asthma exacerbation  - given lower extremity swelling, high probability of PE, CTA PE protocol obtained and negative for PE  - telemetry   -follow up 2D echocardiogram   -troponins negative x2    Resolved

## 2024-12-30 NOTE — ASSESSMENT & PLAN NOTE
Body mass index is 70.03 kg/m². Morbid obesity complicates all aspects of disease management from diagnostic modalities to treatment.   Likely has component of obesity hypoventilation syndrome  Conselled weight loss

## 2024-12-30 NOTE — PROGRESS NOTES
[facility] Medicine  Progress Note    Patient Name: Ifrah Esquivel  MRN: [id number]  Patient Class: IP- Inpatient   Admission Date: 12/28/2024  Length of Stay: 1 days  Attending Physician: Loly Kim, *  Primary Care Provider: Laura, Primary Doctor        Subjective     Principal Problem:Asthma exacerbation        HPI:  Ms. Esquivel is a 39 yr old female with a hx of obesity, HTN, asthma, tobacco use, bipolar disorder, anxiety, PTSD who presents to ED with a chief complaint of shortness of breath.  Patient states that initially her symptoms began as a dry cough 3 days ago and the next day she began having shortness of breath.  She does endorse being out in the cold weather and rain recently prior to symptom onset.  She states over the last 3 days her symptoms have progressively worsened.  She states that any kind of exertion exacerbates her symptoms.  She also endorses associated wheezing and lightheadedness.  She has tried her home inhalers and nebulizer treatments with minimal relief.  She smokes about half a pack of cigarettes a day and denies alcohol and recreational drug use.  She denies fever, chills, chest pain, abdominal pain, nausea, vomiting, diarrhea, dysuria, hematuria, syncope.    Upon arrival to ED, patient afebrile, HR of 98, RR of 24, BP of 127/94, satting 97% on RA.  Initial labs in the ED unremarkable.  CXR with no acute cardiopulmonary abnormality.  Patient was given budesonide 0.5 mg nebulization, ipratropium 0.5 mg nebulization, levalbuterol 1.25 mg nebulization x2, lorazepam 0.5 mg IV, Mag sulfate 2 g IV, methylprednisolone 125 mg IV, 250 mL NS bolus while in the ED. even after nebulization treatments patient was still noted to have increased work of breathing and decreased air movement and was placed on continuous BiPAP.  Patient stayed on BiPAP for about 3 hours according to documented vitals in the ED and seemed to have improved with better air movement throughout  lung fields.  Discussed case with ED provider and patient will be placed under observation for further management.    Overview/Hospital Course:  Ms. Esquivel is a 33-year-old female with history of obesity, asthma, tobacco use, anxiety presented with chief complaint shortness of breath.  She was admitted for severe asthma exacerbation requiring IV magnesium , and nebulizations multiple times.  She required continue BiPAP, was continued on IV steroids and pulmonology was consulted.     Interval History:  Patient is seen and examined at bedside.    She looks better than yesterday.  Pulmonology following.  Was on nasal cannula at the time of examination, comfortable, she mentioned she was taking a break from BiPAP.    No chest pain, no other symptoms.  She is a full code and understands about intubation if needed.  Updated  bedside.    Review of Systems  Objective:     Vital Signs (Most Recent):  Temp: 97.7 °F (36.5 °C) (12/30/24 1218)  Pulse: 98 (12/30/24 1430)  Resp: 19 (12/30/24 1430)  BP: 134/78 (12/30/24 1430)  SpO2: (!) 91 % (12/30/24 1430) Vital Signs (24h Range):  Temp:  [97.3 °F (36.3 °C)-98.5 °F (36.9 °C)] 97.7 °F (36.5 °C)  Pulse:  [0-103] 98  Resp:  [16-31] 19  SpO2:  [88 %-96 %] 91 %  BP: (119-159)/() 134/78     Weight: (!) 185.1 kg (408 lb)  Body mass index is 70.03 kg/m².    Intake/Output Summary (Last 24 hours) at 12/30/2024 1509  Last data filed at 12/30/2024 0928  Gross per 24 hour   Intake 604.8 ml   Output --   Net 604.8 ml         Physical Exam  Vitals reviewed.   Constitutional:       General: She is awake. She is not in acute distress.     Appearance: Normal appearance. She is obese. She is not ill-appearing or diaphoretic.      Comments: Patient still audibly wheezing.   Cardiovascular:      Rate and Rhythm: Normal rate.      Heart sounds: Normal heart sounds. No murmur heard.     No friction rub. No gallop.   Pulmonary:      Effort: Pulmonary effort is normal. No respiratory distress.       Breath sounds: Wheezing (Diffuse expiratory wheezing to bilateral lungs) present.   Abdominal:      General: Bowel sounds are normal.      Palpations: Abdomen is soft.      Tenderness: There is no abdominal tenderness. There is no guarding or rebound.   Musculoskeletal:      Right lower le+ Pitting Edema present.      Left lower le+ Pitting Edema present.   Skin:     General: Skin is warm and dry.   Neurological:      Mental Status: She is alert and oriented to person, place, and time.   Psychiatric:         Behavior: Behavior is cooperative.             Significant Labs: All pertinent labs within the past 24 hours have been reviewed.    Significant Imaging: I have reviewed all pertinent imaging results/findings within the past 24 hours.    Assessment and Plan     * Asthma exacerbation  - Presenting to the ED with a chief complaint of shortness of breath, found to be in asthma exacerbation  - Multiple nebulization treatments, lorazepam, Mag sulfate, and methylprednisolone given.  - DuoNebs scheduled  - Methylprednisolone IV, pulmonology tapering the dose  - COVID and flu negative  - Respiratory panel negative  - Pulmonology consulted, appreciate recs  - Respiratory eval and treat, appreciate recs  - improved respiratory status, BiPAP support  - repeat ABG  - if she worsens, she will eventually require intubation    Swelling of lower extremity  - duplex venous ultrasound negative for DVT  - obesity is high-risk  - BNP normal  - follow up ECHO  - IV steroids can cause fluid retention as well      Pleuritic chest pain  - likely secondary to her acute asthma exacerbation  - given lower extremity swelling, high probability of PE, CTA PE protocol obtained and negative for PE  - telemetry   -follow up 2D echocardiogram   -troponins negative x2    Resolved      Acute hypoxic respiratory failure  -likely secondary to asthma exacerbation, treat as above  -BiPAP support  -repeat ABG  -pulmonology following,  "appreciate their recommendations  -2D echocardiogram ordered given bilateral lower extremity edema      Essential hypertension  Latest blood pressure and vitals reviewed-   Temp:  [97.3 °F (36.3 °C)-98.5 °F (36.9 °C)]   Pulse:  [0-103]   Resp:  [16-31]   BP: (119-159)/()   SpO2:  [88 %-96 %] .     - Currently no antihypertensives on home med list  - PRN hydralazine    Morbid obesity  Body mass index is 70.03 kg/m². Morbid obesity complicates all aspects of disease management from diagnostic modalities to treatment.   Likely has component of obesity hypoventilation syndrome  Conselled weight loss      Leukocytosis       No fever, no other infectious symptoms or signs, likely secondary to   steroids very high dose      VTE Risk Mitigation (From admission, onward)           Ordered     enoxaparin injection 60 mg  Every 12 hours        Note to Pharmacy: Ht: 5' 4" (1.626 m)  Wt: (!) 176.3 kg (388 lb 11.2 oz)  Estimated Creatinine Clearance: 157 mL/min (based on SCr of 0.8 mg/dL).  Body mass index is 66.72 kg/m².    12/30/24 0326     IP VTE HIGH RISK PATIENT  Once         12/28/24 1810     Place sequential compression device  Until discontinued         12/28/24 1810                    Discharge Planning   NATALIE: 1/1/2025     Code Status: Full Code   Medical Readiness for Discharge Date:   Discharge Plan A: Home with family                        Loly Kim MD  Department of Hospital Medicine   [facility]    "

## 2024-12-30 NOTE — ASSESSMENT & PLAN NOTE
-likely secondary to asthma exacerbation, treat as above  -BiPAP support  -repeat ABG  -pulmonology following, appreciate their recommendations  -2D echocardiogram ordered given bilateral lower extremity edema

## 2024-12-30 NOTE — ASSESSMENT & PLAN NOTE
- Presenting to the ED with a chief complaint of shortness of breath, found to be in asthma exacerbation  - Multiple nebulization treatments, lorazepam, Mag sulfate, and methylprednisolone given.  - Ellen scheduled  - Methylprednisolone IV, pulmonology tapering the dose  - COVID and flu negative  - Respiratory panel negative  - Pulmonology consulted, appreciate recs  - Respiratory eval and treat, appreciate recs  - improved respiratory status, BiPAP support  - repeat ABG  - if she worsens, she will eventually require intubation

## 2024-12-30 NOTE — PLAN OF CARE
Problem: Adult Inpatient Plan of Care  Goal: Plan of Care Review  Outcome: Progressing  Goal: Patient-Specific Goal (Individualized)  Outcome: Progressing  Goal: Absence of Hospital-Acquired Illness or Injury  Outcome: Progressing  Goal: Optimal Comfort and Wellbeing  Outcome: Progressing  Goal: Readiness for Transition of Care  Outcome: Progressing     Problem: Bariatric Environmental Safety  Goal: Safety Maintained with Care  Outcome: Progressing     Problem: Wound  Goal: Optimal Coping  Outcome: Progressing  Goal: Optimal Functional Ability  Outcome: Progressing  Goal: Absence of Infection Signs and Symptoms  Outcome: Progressing  Goal: Improved Oral Intake  Outcome: Progressing  Goal: Optimal Pain Control and Function  Outcome: Progressing  Goal: Skin Health and Integrity  Outcome: Progressing  Goal: Optimal Wound Healing  Outcome: Progressing     Problem: Asthma Exacerbation  Goal: Asthma Symptom Relief  Outcome: Progressing

## 2024-12-30 NOTE — PROGRESS NOTES
12/30/2024      Admit Date: 12/28/2024  Ifrah LANZA Alvin  Pulmonary/Critical Care   Progress Note    Chief Complaint   Patient presents with    Shortness of Breath     Patient short of breath x 2 days, hx of asthma and nebulizer not helping nor did breathing treatment at 10am - patient audibly wheezing in triage - sats are 97% but pt has increased wob         History of Present Illness:  Pt is a 38 yo female with obesity, HTN, asthma, tobacco use, bipolar disorder, anxiety, PTSD who presents to ED with a chief complaint of shortness of breath. Pulmonary is consulted for asthma exacerbation. Pt has been having cough about 2 days and worsening sob with wheezing, feeling weak and exhausted. She has been using albuterol inhaler and nebs at home but is not on a control inhaler. She has had lifelong asthma, denies intubation for asthma. Currently pt requiring BIPAP.    12/30/2024 - Feels about the same, wearing BiPAP, still feels tight and wheezing.  She reports that her primary MD has been taking care of her asthma and that she has not been o a controlling medication.  WBC has increased (? Leukemoid reaction due to steroids).  SHe reports that she has had asthma since childhood and has been allergy tested and treated as a child.  She has a dog as a pet. SHe says she has not been tested for FAVIAN.        PFSH:  Past Medical History:   Diagnosis Date    Anxiety     Asthma     Bipolar affective disorder, current episode manic with psychotic symptoms     Hypertension     Manic bipolar I disorder     Mental health disorder     PTSD (post-traumatic stress disorder)     Substance abuse      Past Surgical History:   Procedure Laterality Date    APPENDECTOMY      c sections      x 3    CHOLECYSTECTOMY      ESOPHAGOGASTRODUODENOSCOPY N/A 9/15/2022    Procedure: EGD (ESOPHAGOGASTRODUODENOSCOPY);  Surgeon: Law Sheriff MD;  Location: [facility];  Service: Endoscopy;  Laterality: N/A;    HYSTERECTOMY      TONSILLECTOMY       tonsils and adenoidectomy       Social History     Tobacco Use    Smoking status: Every Day     Current packs/day: 0.50     Types: Cigarettes    Smokeless tobacco: Never   Substance Use Topics    Alcohol use: Never    Drug use: Yes     Types: Marijuana     Comment: daily     Family History   Problem Relation Name Age of Onset    Ovarian cancer Mother      Cancer Other m great gm         breast    No Known Problems Father      Diabetes Paternal Grandmother       Review of patient's allergies indicates:   Allergen Reactions    Asa [aspirin]     Aspirin Other (See Comments)     avoids due to asthma    Ultram [tramadol]     Ultram [tramadol] Nausea And Vomiting    Zofran [ondansetron hcl (pf)]     Zofran [ondansetron hcl (pf)] Other (See Comments)     Gives migraines       Performance Status:Performance Status:The patient's activity level is no limits with regular activity.    Review of Systems   Constitutional:  Positive for malaise/fatigue. Negative for chills, diaphoresis, fever and weight loss.   HENT:  Negative for congestion.    Eyes:  Negative for pain.   Respiratory:  Positive for cough, shortness of breath and wheezing. Negative for hemoptysis, sputum production and stridor.    Cardiovascular:  Negative for chest pain, palpitations, orthopnea, claudication, leg swelling and PND.   Gastrointestinal:  Negative for abdominal pain, constipation, diarrhea, heartburn, nausea and vomiting.   Genitourinary:  Negative for dysuria, frequency and urgency.   Musculoskeletal:  Positive for joint pain. Negative for falls and myalgias.   Neurological:  Negative for sensory change, focal weakness and weakness.   Psychiatric/Behavioral:  Negative for depression. The patient is nervous/anxious.          Physical Exam  Vitals and nursing note reviewed.   Constitutional:       General: She is not in acute distress.     Appearance: Normal appearance. She is obese. She is not ill-appearing, toxic-appearing or diaphoretic.       Comments: Wearing BiPAP  Anxious   HENT:      Head: Normocephalic and atraumatic.      Right Ear: External ear normal.      Left Ear: External ear normal.      Nose: Nose normal. No congestion or rhinorrhea.      Mouth/Throat:      Mouth: Mucous membranes are moist.      Pharynx: Oropharynx is clear. No oropharyngeal exudate or posterior oropharyngeal erythema.   Eyes:      General: No scleral icterus.        Right eye: No discharge.         Left eye: No discharge.      Extraocular Movements: Extraocular movements intact.      Conjunctiva/sclera: Conjunctivae normal.      Pupils: Pupils are equal, round, and reactive to light.   Neck:      Vascular: No carotid bruit.   Cardiovascular:      Rate and Rhythm: Normal rate and regular rhythm.      Pulses: Normal pulses.      Heart sounds: No murmur heard.     No friction rub. No gallop.   Pulmonary:      Effort: Pulmonary effort is normal. No respiratory distress.      Breath sounds: No stridor. Wheezing and rhonchi present. No rales.   Chest:      Chest wall: No tenderness.   Abdominal:      General: Bowel sounds are normal. There is no distension.      Tenderness: There is no abdominal tenderness. There is no guarding.      Comments: obese   Musculoskeletal:         General: No swelling. Normal range of motion.      Cervical back: Normal range of motion and neck supple. No rigidity or tenderness.      Right lower leg: No edema.      Left lower leg: No edema.   Lymphadenopathy:      Cervical: No cervical adenopathy.   Skin:     General: Skin is warm and dry.      Capillary Refill: Capillary refill takes less than 2 seconds.      Coloration: Skin is not jaundiced.      Findings: No bruising.   Neurological:      General: No focal deficit present.      Mental Status: She is alert and oriented to person, place, and time. Mental status is at baseline.      Cranial Nerves: No cranial nerve deficit.      Sensory: No sensory deficit.      Motor: No weakness.   Psychiatric:     "     Mood and Affect: Mood normal.         Behavior: Behavior normal.         Thought Content: Thought content normal.         Judgment: Judgment normal.           Imaging Results              X-Ray Chest AP Portable (Final result)  Result time 12/28/24 13:20:37      Final result by Jillian Collazo MD (12/28/24 13:20:37)                   Impression:      No acute cardiopulmonary abnormality.      Electronically signed by: Jillian Collazo  Date:    12/28/2024  Time:    13:20               Narrative:    EXAMINATION:  XR CHEST AP PORTABLE    CLINICAL HISTORY:  Asthma;    FINDINGS:  Portable chest at 13:03 hours is compared to 12/15/2024 shows normal cardiomediastinal silhouette.    Lungs are clear. Pulmonary vasculature is normal. No acute osseous abnormality.                                          Labs     Recent Labs   Lab 12/30/24 0453   WBC 25.77*   HGB 12.8   HCT 39.2        Recent Labs   Lab 12/30/24 0453      K 4.0      CO2 24   BUN 19   CREATININE 0.7   *   CALCIUM 8.9   MG 2.1   PHOS 2.7     No results for input(s): "PH", "PCO2", "PO2", "HCO3" in the last 24 hours.    Microbiology Results (last 7 days)       Procedure Component Value Units Date/Time    Respiratory Infection Panel (PCR), Nasopharyngeal [[id number]] Collected: 12/28/24 1943    Order Status: Completed Specimen: Nasopharyngeal Swab Updated: 12/28/24 2116     Respiratory Infection Panel Source NP swab     Adenovirus Not Detected     Coronavirus 229E, Common Cold Virus Not Detected     Coronavirus HKU1, Common Cold Virus Not Detected     Coronavirus NL63, Common Cold Virus Not Detected     Coronavirus OC43, Common Cold Virus Not Detected     Comment: Coronavirus strains 229E, HKU1, NL63, and OC43 can cause the common   cold   and are not associated with the respiratory disease outbreak caused   by  the COVID-19 (SARS-CoV-2 novel Coronavirus) strain.           SARS-CoV2 (COVID-19) Qualitative PCR Not Detected     " Human Metapneumovirus Not Detected     Human Rhinovirus/Enterovirus Not Detected     Influenza A (subtypes H1, H1-2009,H3) Not Detected     Influenza B Not Detected     Parainfluenza Virus 1 Not Detected     Parainfluenza Virus 2 Not Detected     Parainfluenza Virus 3 Not Detected     Parainfluenza Virus 4 Not Detected     Respiratory Syncytial Virus Not Detected     Bordetella Parapertussis (CU2047) Not Detected     Bordetella pertussis (ptxP) Not Detected     Chlamydia pneumoniae Not Detected     Mycoplasma pneumoniae Not Detected     Comment: Respiratory Infection Panel testing performed by Multiplex PCR.       Narrative:      Respiratory Infection Panel source->NP Swab    Respiratory Infection Panel (PCR), Nasopharyngeal [[id number]]     Order Status: No result Specimen: Nasopharyngeal Swab             Impression:  Active Hospital Problems    Diagnosis  POA    *Asthma exacerbation [J45.901]  Yes    Pleuritic chest pain [R07.81]  No    Swelling of lower extremity [M79.89]  Yes    Morbid obesity [E66.01]  Yes    Essential hypertension [I10]  Yes    Acute hypoxic respiratory failure [J96.01]  Yes      Resolved Hospital Problems   No resolved problems to display.               Plan:       - BiPAP as needed, O2 as able  - continue steroids  - continue nebulized bronchodilators- q4h with q2h prn  - must have asthma control inhaler started on discharge  - weight loss is essential to her care  - should be screened for FAVIAN when stable and an outpt      Favio Guzman MD  [facility] Pulmonary/Critical Care  12/30/2024

## 2024-12-30 NOTE — SUBJECTIVE & OBJECTIVE
Interval History:  Patient is seen and examined at bedside.    She looks better than yesterday.  Pulmonology following.  Was on nasal cannula at the time of examination, comfortable, she mentioned she was taking a break from BiPAP.    No chest pain, no other symptoms.  She is a full code and understands about intubation if needed.  Updated  bedside.    Review of Systems  Objective:     Vital Signs (Most Recent):  Temp: 97.7 °F (36.5 °C) (24 1218)  Pulse: 98 (24 1430)  Resp: 19 (24 1430)  BP: 134/78 (24 1430)  SpO2: (!) 91 % (24 1430) Vital Signs (24h Range):  Temp:  [97.3 °F (36.3 °C)-98.5 °F (36.9 °C)] 97.7 °F (36.5 °C)  Pulse:  [0-103] 98  Resp:  [16-31] 19  SpO2:  [88 %-96 %] 91 %  BP: (119-159)/() 134/78     Weight: (!) 185.1 kg (408 lb)  Body mass index is 70.03 kg/m².    Intake/Output Summary (Last 24 hours) at 2024 1509  Last data filed at 2024 0928  Gross per 24 hour   Intake 604.8 ml   Output --   Net 604.8 ml         Physical Exam  Vitals reviewed.   Constitutional:       General: She is awake. She is not in acute distress.     Appearance: Normal appearance. She is obese. She is not ill-appearing or diaphoretic.      Comments: Patient still audibly wheezing.   Cardiovascular:      Rate and Rhythm: Normal rate.      Heart sounds: Normal heart sounds. No murmur heard.     No friction rub. No gallop.   Pulmonary:      Effort: Pulmonary effort is normal. No respiratory distress.      Breath sounds: Wheezing (Diffuse expiratory wheezing to bilateral lungs) present.   Abdominal:      General: Bowel sounds are normal.      Palpations: Abdomen is soft.      Tenderness: There is no abdominal tenderness. There is no guarding or rebound.   Musculoskeletal:      Right lower le+ Pitting Edema present.      Left lower le+ Pitting Edema present.   Skin:     General: Skin is warm and dry.   Neurological:      Mental Status: She is alert and oriented to person,  place, and time.   Psychiatric:         Behavior: Behavior is cooperative.             Significant Labs: All pertinent labs within the past 24 hours have been reviewed.    Significant Imaging: I have reviewed all pertinent imaging results/findings within the past 24 hours.

## 2024-12-30 NOTE — CARE UPDATE
12/30/24 0813   Patient Assessment/Suction   Level of Consciousness (AVPU) alert   Respiratory Effort Slight;Short of breath   Expansion/Accessory Muscles/Retractions no use of accessory muscles;no retractions;expansion symmetric   All Lung Fields Breath Sounds Anterior:   KEVIN Breath Sounds wheezes, expiratory   RUL Breath Sounds wheezes, expiratory   Rhythm/Pattern, Respiratory assisted mechanically   Cough Frequency no cough   Skin Integrity   $ Wound Care Tech Time 15 min   Area Observed Bridge of nose   Skin Appearance redness nonblanchable   Barrier used? Liquid Filled Cushion   PRE-TX-O2   Device (Oxygen Therapy) BIPAP   $ Is the patient on High Flow Oxygen? Yes   SpO2 95 %   Pulse Oximetry Type Continuous   $ Pulse Oximetry - Multiple Charge Pulse Oximetry - Multiple   Pulse 85   Resp (!) 21   Aerosol Therapy   $ Aerosol Therapy Charges Aerosol Treatment   Daily Review of Necessity (SVN) completed   Respiratory Treatment Status (SVN) given   Treatment Route (SVN) in-line   Patient Position Quintanilla's   Post Treatment Assessment (SVN) breath sounds unchanged   Signs of Intolerance (SVN) none   Breath Sounds Post-Respiratory Treatment   Throughout All Fields Post-Treatment All Fields   Throughout All Fields Post-Treatment no change   Post-treatment Heart Rate (beats/min) 85   Post-treatment Resp Rate (breaths/min) 24   Preset CPAP/BiPAP Settings   Mode Of Delivery BiPAP   $ CPAP/BiPAP Daily Charge 1   CPAP/BIPAP charged w/in last 24 h YES   $ Is patient using? Yes   Size of Mask Medium/Large   Sized Appropriately? Yes   Equipment Type V60   Airway Device Type medium full face mask   Ipap 12   EPAP (cm H2O) 5   Pressure Support (cm H2O) 7   Set Rate (Breaths/Min) 16   ITime (sec) 1   Rise Time (sec) 3   Patient CPAP/BiPAP Settings   CPAP/BIPAP ID 13   RR Total (Breaths/Min) 21   Tidal Volume (mL) 759   VE Minute Ventilation (L/min) 31.3 L/min   Peak Inspiratory Pressure (cm H2O) 13   TiTOT (%) 26   Total Leak  (L/Min) 0   Patient Trigger - ST Mode Only (%) 94   CPAP/BiPAP Alarms   High Pressure (cm H2O) 40   Low Pressure (cm H2O) 5   High RR (breaths/min) 45   Low RR (breaths/min) 8   Apnea (Sec) 20   Education   $ Education BiPAP;30 min;Bronchodilator

## 2024-12-30 NOTE — ASSESSMENT & PLAN NOTE
- duplex venous ultrasound negative for DVT  - obesity is high-risk  - BNP normal  - follow up ECHO  - IV steroids can cause fluid retention as well

## 2024-12-30 NOTE — PROGRESS NOTES
Pharmacist Renal Dose Adjustment Note    Ifrah Esquivel is a 39 y.o. female being treated with Enoxaparin.     Patient Data:    Vital Signs (Most Recent):  Temp: 97.6 °F (36.4 °C) (12/29/24 2300)  Pulse: 103 (12/30/24 0100)  Resp: 20 (12/30/24 0100)  BP: 132/87 (12/30/24 0100)  SpO2: (!) 88 % (12/30/24 0100) Vital Signs (72h Range):  Temp:  [97.4 °F (36.3 °C)-98.5 °F (36.9 °C)]   Pulse:  [0-104]   Resp:  [13-56]   BP: (113-159)/()   SpO2:  [86 %-100 %]      Recent Labs   Lab 12/28/24  1421 12/29/24  0439   CREATININE 0.8 0.8     Serum creatinine: 0.8 mg/dL 12/29/24 0439  Estimated creatinine clearance: 157 mL/min  Body mass index is 66.72 kg/m².    Enoxaparin 40 mg every 12 hours will be changed to Enoxaparin 60 mg every 12 hours due to CrCl > 30  and BMI > 50 per Renal Dose Adjustment protocol.     Pharmacist's Name: Gauri Boo  Pharmacist's Extension: [phone]

## 2024-12-31 LAB
ALLENS TEST: ABNORMAL
ANION GAP SERPL CALC-SCNC: 5 MMOL/L (ref 8–16)
BUN SERPL-MCNC: 23 MG/DL (ref 6–20)
CALCIUM SERPL-MCNC: 8.8 MG/DL (ref 8.7–10.5)
CHLORIDE SERPL-SCNC: 105 MMOL/L (ref 95–110)
CO2 SERPL-SCNC: 28 MMOL/L (ref 23–29)
CREAT SERPL-MCNC: 0.9 MG/DL (ref 0.5–1.4)
DELSYS: ABNORMAL
ERYTHROCYTE [DISTWIDTH] IN BLOOD BY AUTOMATED COUNT: 15.2 % (ref 11.5–14.5)
EST. GFR  (NO RACE VARIABLE): >60 ML/MIN/1.73 M^2
GLUCOSE SERPL-MCNC: 142 MG/DL (ref 70–110)
HCO3 UR-SCNC: 29.3 MMOL/L (ref 24–28)
HCT VFR BLD AUTO: 40.9 % (ref 37–48.5)
HGB BLD-MCNC: 13.2 G/DL (ref 12–16)
MAGNESIUM SERPL-MCNC: 2 MG/DL (ref 1.6–2.6)
MCH RBC QN AUTO: 30 PG (ref 27–31)
MCHC RBC AUTO-ENTMCNC: 32.3 G/DL (ref 32–36)
MCV RBC AUTO: 93 FL (ref 82–98)
PCO2 BLDA: 44.5 MMHG (ref 35–45)
PH SMN: 7.43 [PH] (ref 7.35–7.45)
PHOSPHATE SERPL-MCNC: 3.1 MG/DL (ref 2.7–4.5)
PLATELET # BLD AUTO: 341 K/UL (ref 150–450)
PMV BLD AUTO: 9.7 FL (ref 9.2–12.9)
PO2 BLDA: 25 MMHG (ref 40–60)
POC BE: 5 MMOL/L
POC SATURATED O2: 45 % (ref 95–100)
POC TCO2: 31 MMOL/L (ref 24–29)
POTASSIUM SERPL-SCNC: 4.3 MMOL/L (ref 3.5–5.1)
RBC # BLD AUTO: 4.4 M/UL (ref 4–5.4)
SAMPLE: ABNORMAL
SITE: ABNORMAL
SODIUM SERPL-SCNC: 138 MMOL/L (ref 136–145)
WBC # BLD AUTO: 15.5 K/UL (ref 3.9–12.7)

## 2024-12-31 PROCEDURE — 25000242 PHARM REV CODE 250 ALT 637 W/ HCPCS

## 2024-12-31 PROCEDURE — 99232 SBSQ HOSP IP/OBS MODERATE 35: CPT | Mod: ,,, | Performed by: INTERNAL MEDICINE

## 2024-12-31 PROCEDURE — 27100171 HC OXYGEN HIGH FLOW UP TO 24 HOURS

## 2024-12-31 PROCEDURE — 85027 COMPLETE CBC AUTOMATED: CPT

## 2024-12-31 PROCEDURE — 99900031 HC PATIENT EDUCATION (STAT)

## 2024-12-31 PROCEDURE — 63600175 PHARM REV CODE 636 W HCPCS: Performed by: INTERNAL MEDICINE

## 2024-12-31 PROCEDURE — 21000000 HC CCU ICU ROOM CHARGE

## 2024-12-31 PROCEDURE — 99900035 HC TECH TIME PER 15 MIN (STAT)

## 2024-12-31 PROCEDURE — 84100 ASSAY OF PHOSPHORUS: CPT

## 2024-12-31 PROCEDURE — 82803 BLOOD GASES ANY COMBINATION: CPT

## 2024-12-31 PROCEDURE — 94660 CPAP INITIATION&MGMT: CPT

## 2024-12-31 PROCEDURE — 83735 ASSAY OF MAGNESIUM: CPT

## 2024-12-31 PROCEDURE — 36415 COLL VENOUS BLD VENIPUNCTURE: CPT

## 2024-12-31 PROCEDURE — 94761 N-INVAS EAR/PLS OXIMETRY MLT: CPT

## 2024-12-31 PROCEDURE — 80048 BASIC METABOLIC PNL TOTAL CA: CPT

## 2024-12-31 PROCEDURE — 94640 AIRWAY INHALATION TREATMENT: CPT

## 2024-12-31 PROCEDURE — 25000003 PHARM REV CODE 250

## 2024-12-31 PROCEDURE — 63600175 PHARM REV CODE 636 W HCPCS

## 2024-12-31 RX ORDER — FUROSEMIDE 10 MG/ML
40 INJECTION INTRAMUSCULAR; INTRAVENOUS DAILY
Status: DISCONTINUED | OUTPATIENT
Start: 2024-12-31 | End: 2025-01-02 | Stop reason: HOSPADM

## 2024-12-31 RX ADMIN — ALUMINUM HYDROXIDE, MAGNESIUM HYDROXIDE, AND SIMETHICONE 30 ML: 200; 200; 20 SUSPENSION ORAL at 07:12

## 2024-12-31 RX ADMIN — MORPHINE SULFATE 1 MG: 2 INJECTION, SOLUTION INTRAMUSCULAR; INTRAVENOUS at 08:12

## 2024-12-31 RX ADMIN — METHYLPREDNISOLONE SODIUM SUCCINATE 80 MG: 40 INJECTION, POWDER, FOR SOLUTION INTRAMUSCULAR; INTRAVENOUS at 07:12

## 2024-12-31 RX ADMIN — ENOXAPARIN SODIUM 60 MG: 60 INJECTION SUBCUTANEOUS at 08:12

## 2024-12-31 RX ADMIN — IPRATROPIUM BROMIDE AND ALBUTEROL SULFATE 3 ML: .5; 3 SOLUTION RESPIRATORY (INHALATION) at 06:12

## 2024-12-31 RX ADMIN — MORPHINE SULFATE 1 MG: 2 INJECTION, SOLUTION INTRAMUSCULAR; INTRAVENOUS at 04:12

## 2024-12-31 RX ADMIN — QUETIAPINE 300 MG: 100 TABLET ORAL at 08:12

## 2024-12-31 RX ADMIN — IPRATROPIUM BROMIDE AND ALBUTEROL SULFATE 3 ML: .5; 3 SOLUTION RESPIRATORY (INHALATION) at 07:12

## 2024-12-31 RX ADMIN — LORAZEPAM 1 MG: 2 INJECTION INTRAMUSCULAR; INTRAVENOUS at 04:12

## 2024-12-31 RX ADMIN — LORAZEPAM 1 MG: 2 INJECTION INTRAMUSCULAR; INTRAVENOUS at 12:12

## 2024-12-31 RX ADMIN — IPRATROPIUM BROMIDE AND ALBUTEROL SULFATE 3 ML: .5; 3 SOLUTION RESPIRATORY (INHALATION) at 02:12

## 2024-12-31 RX ADMIN — METHYLPREDNISOLONE SODIUM SUCCINATE 80 MG: 40 INJECTION, POWDER, FOR SOLUTION INTRAMUSCULAR; INTRAVENOUS at 04:12

## 2024-12-31 RX ADMIN — IPRATROPIUM BROMIDE AND ALBUTEROL SULFATE 3 ML: .5; 3 SOLUTION RESPIRATORY (INHALATION) at 11:12

## 2024-12-31 RX ADMIN — FUROSEMIDE 40 MG: 10 INJECTION, SOLUTION INTRAMUSCULAR; INTRAVENOUS at 11:12

## 2024-12-31 RX ADMIN — MORPHINE SULFATE 1 MG: 2 INJECTION, SOLUTION INTRAMUSCULAR; INTRAVENOUS at 12:12

## 2024-12-31 RX ADMIN — LORAZEPAM 1 MG: 2 INJECTION INTRAMUSCULAR; INTRAVENOUS at 07:12

## 2024-12-31 RX ADMIN — METHYLPREDNISOLONE SODIUM SUCCINATE 80 MG: 40 INJECTION, POWDER, FOR SOLUTION INTRAMUSCULAR; INTRAVENOUS at 11:12

## 2024-12-31 NOTE — PLAN OF CARE
Problem: Adult Inpatient Plan of Care  Goal: Plan of Care Review  Outcome: Progressing  Goal: Patient-Specific Goal (Individualized)  Outcome: Progressing  Goal: Absence of Hospital-Acquired Illness or Injury  Outcome: Progressing  Goal: Optimal Comfort and Wellbeing  Outcome: Progressing  Goal: Readiness for Transition of Care  Outcome: Progressing     Problem: Bariatric Environmental Safety  Goal: Safety Maintained with Care  Outcome: Progressing     Problem: Wound  Goal: Optimal Coping  Outcome: Progressing  Goal: Optimal Functional Ability  Outcome: Progressing  Goal: Absence of Infection Signs and Symptoms  Outcome: Progressing  Goal: Improved Oral Intake  Outcome: Progressing  Goal: Optimal Pain Control and Function  Outcome: Progressing  Goal: Skin Health and Integrity  Outcome: Progressing  Goal: Optimal Wound Healing  Outcome: Progressing     Problem: Asthma Exacerbation  Goal: Asthma Symptom Relief  Outcome: Progressing     Problem: Infection  Goal: Absence of Infection Signs and Symptoms  Outcome: Progressing

## 2024-12-31 NOTE — ASSESSMENT & PLAN NOTE
Latest blood pressure and vitals reviewed-   Temp:  [97.7 °F (36.5 °C)-98.8 °F (37.1 °C)]   Pulse:  []   Resp:  [17-25]   BP: (114-159)/(65-97)   SpO2:  [89 %-96 %] .     - Currently no antihypertensives on home med list  - PRN hydralazine

## 2024-12-31 NOTE — ASSESSMENT & PLAN NOTE
- likely secondary to her acute asthma exacerbation  - given lower extremity swelling, high probability of PE, CTA PE protocol obtained and negative for PE  - telemetry   -2D echocardiogram showed normal systolic and diastolic function  -troponins negative x2    Resolved

## 2024-12-31 NOTE — SUBJECTIVE & OBJECTIVE
Interval History:  Patient is seen and examined at bedside.  She has improved.  But still has significant wheezing, continues to be on IV Steroids    For anasarca, started lasix.     Close monitoring in step-down while requiring frequent BiPAP    Review of Systems  Objective:     Vital Signs (Most Recent):  Temp: 97.9 °F (36.6 °C) (12/31/24 1300)  Pulse: 90 (12/31/24 1439)  Resp: 20 (12/31/24 1439)  BP: 114/75 (12/31/24 1345)  SpO2: (!) 93 % (12/31/24 1439) Vital Signs (24h Range):  Temp:  [97.7 °F (36.5 °C)-98.8 °F (37.1 °C)] 97.9 °F (36.6 °C)  Pulse:  [] 90  Resp:  [17-25] 20  SpO2:  [89 %-96 %] 93 %  BP: (114-159)/(65-97) 114/75     Weight: (!) 185.1 kg (408 lb)  Body mass index is 70.03 kg/m².    Intake/Output Summary (Last 24 hours) at 12/31/2024 1530  Last data filed at 12/31/2024 0950  Gross per 24 hour   Intake 840 ml   Output --   Net 840 ml         Physical Exam  Vitals reviewed.   Constitutional:       General: She is awake. She is not in acute distress.     Appearance: Normal appearance. She is obese. She is not ill-appearing or diaphoretic.      Comments: Patient still audibly wheezing.   Cardiovascular:      Rate and Rhythm: Normal rate.      Heart sounds: Normal heart sounds. No murmur heard.     No friction rub. No gallop.   Pulmonary:      Effort: Pulmonary effort is normal. No respiratory distress.      Breath sounds: Wheezing (Diffuse expiratory wheezing to bilateral lungs) present.   Abdominal:      General: Bowel sounds are normal.      Palpations: Abdomen is soft.      Tenderness: There is no abdominal tenderness. There is no guarding or rebound.   Musculoskeletal:      Right lower leg: Edema present.      Left lower leg: Edema present.   Skin:     General: Skin is warm and dry.   Neurological:      Mental Status: She is alert and oriented to person, place, and time.   Psychiatric:         Behavior: Behavior is cooperative.             Significant Labs: All pertinent labs within the past  24 hours have been reviewed.    Significant Imaging: I have reviewed all pertinent imaging results/findings within the past 24 hours.

## 2024-12-31 NOTE — CARE UPDATE
Received notification from patient's for nurse that she lost her IV access.  Patient is extremely difficult stick and staff is unable to get access.  Discussed with hospital MD who agreed midline access can be obtained for on in case of emergency.  Would appreciate if day primary team could review patient's medications to change any IV to IM and/or p.o. if able.

## 2024-12-31 NOTE — ASSESSMENT & PLAN NOTE
- duplex venous ultrasound negative for DVT  - obesity is high-risk  - BNP normal  - echo normal systolic and diastolic function  - IV steroids can cause fluid retention as well  - IV Lasix 40 mg

## 2024-12-31 NOTE — PROGRESS NOTES
12/31/2024      Admit Date: 12/28/2024  Ifrah [facility]  Pulmonary/Critical Care   Progress Note    Chief Complaint   Patient presents with    Shortness of Breath     Patient short of breath x 2 days, hx of asthma and nebulizer not helping nor did breathing treatment at 10am - patient audibly wheezing in triage - sats are 97% but pt has increased wob         History of Present Illness:  Pt is a 40 yo female with obesity, HTN, asthma, tobacco use, bipolar disorder, anxiety, PTSD who presents to ED with a chief complaint of shortness of breath. Pulmonary is consulted for asthma exacerbation. Pt has been having cough about 2 days and worsening sob with wheezing, feeling weak and exhausted. She has been using albuterol inhaler and nebs at home but is not on a control inhaler. She has had lifelong asthma, denies intubation for asthma. Currently pt requiring BIPAP.    12/30/2024 - Feels about the same, wearing BiPAP, still feels tight and wheezing.  She reports that her primary MD has been taking care of her asthma and that she has not been o a controlling medication.  WBC has increased (? Leukemoid reaction due to steroids).  SHe reports that she has had asthma since childhood and has been allergy tested and treated as a child.  She has a dog as a pet. SHe says she has not been tested for FAVIAN.      12/31/2024 - Doing better, was able to be off of the BiPAP yesterday during the day.  No new complaints.  She is less anxious today (likely due to decreased steroids dose).  We discussed the need to get her on controlling medications at [facility].      PFSH:  Past Medical History:   Diagnosis Date    Anxiety     Asthma     Bipolar affective disorder, current episode manic with psychotic symptoms     Hypertension     Manic bipolar I disorder     Mental health disorder     PTSD (post-traumatic stress disorder)     Substance abuse      Past Surgical History:   Procedure Laterality Date    APPENDECTOMY      c sections      x 3     CHOLECYSTECTOMY      ESOPHAGOGASTRODUODENOSCOPY N/A 9/15/2022    Procedure: EGD (ESOPHAGOGASTRODUODENOSCOPY);  Surgeon: Law Sheriff MD;  Location: [facility];  Service: Endoscopy;  Laterality: N/A;    HYSTERECTOMY      TONSILLECTOMY      tonsils and adenoidectomy       Social History     Tobacco Use    Smoking status: Every Day     Current packs/day: 0.50     Types: Cigarettes    Smokeless tobacco: Never   Substance Use Topics    Alcohol use: Never    Drug use: Yes     Types: Marijuana     Comment: daily     Family History   Problem Relation Name Age of Onset    Ovarian cancer Mother      Cancer Other m great gm         breast    No Known Problems Father      Diabetes Paternal Grandmother       Review of patient's allergies indicates:   Allergen Reactions    Asa [aspirin]     Aspirin Other (See Comments)     avoids due to asthma    Ultram [tramadol]     Ultram [tramadol] Nausea And Vomiting    Zofran [ondansetron hcl (pf)]     Zofran [ondansetron hcl (pf)] Other (See Comments)     Gives migraines       Performance Status:Performance Status:The patient's activity level is no limits with regular activity.    Review of Systems   Constitutional:  Positive for malaise/fatigue. Negative for chills, diaphoresis, fever and weight loss.   HENT:  Negative for congestion.    Eyes:  Negative for pain.   Respiratory:  Positive for cough, shortness of breath and wheezing. Negative for hemoptysis, sputum production and stridor.    Cardiovascular:  Negative for chest pain, palpitations, orthopnea, claudication, leg swelling and PND.   Gastrointestinal:  Negative for abdominal pain, constipation, diarrhea, heartburn, nausea and vomiting.   Genitourinary:  Negative for dysuria, frequency and urgency.   Musculoskeletal:  Positive for joint pain. Negative for falls and myalgias.   Neurological:  Negative for sensory change, focal weakness and weakness.   Psychiatric/Behavioral:  Negative for depression. The patient is  nervous/anxious.          Physical Exam  Vitals and nursing note reviewed.   Constitutional:       General: She is not in acute distress.     Appearance: Normal appearance. She is obese. She is not ill-appearing, toxic-appearing or diaphoretic.      Comments: Wearing BiPAP  Anxious   HENT:      Head: Normocephalic and atraumatic.      Right Ear: External ear normal.      Left Ear: External ear normal.      Nose: Nose normal. No congestion or rhinorrhea.      Mouth/Throat:      Mouth: Mucous membranes are moist.      Pharynx: Oropharynx is clear. No oropharyngeal exudate or posterior oropharyngeal erythema.   Eyes:      General: No scleral icterus.        Right eye: No discharge.         Left eye: No discharge.      Extraocular Movements: Extraocular movements intact.      Conjunctiva/sclera: Conjunctivae normal.      Pupils: Pupils are equal, round, and reactive to light.   Neck:      Vascular: No carotid bruit.   Cardiovascular:      Rate and Rhythm: Normal rate and regular rhythm.      Pulses: Normal pulses.      Heart sounds: No murmur heard.     No friction rub. No gallop.   Pulmonary:      Effort: Pulmonary effort is normal. No respiratory distress.      Breath sounds: No stridor. Wheezing and rhonchi present. No rales.   Chest:      Chest wall: No tenderness.   Abdominal:      General: Bowel sounds are normal. There is no distension.      Tenderness: There is no abdominal tenderness. There is no guarding.      Comments: obese   Musculoskeletal:         General: No swelling. Normal range of motion.      Cervical back: Normal range of motion and neck supple. No rigidity or tenderness.      Right lower leg: No edema.      Left lower leg: No edema.   Lymphadenopathy:      Cervical: No cervical adenopathy.   Skin:     General: Skin is warm and dry.      Capillary Refill: Capillary refill takes less than 2 seconds.      Coloration: Skin is not jaundiced.      Findings: No bruising.   Neurological:      General: No  "focal deficit present.      Mental Status: She is alert and oriented to person, place, and time. Mental status is at baseline.      Cranial Nerves: No cranial nerve deficit.      Sensory: No sensory deficit.      Motor: No weakness.   Psychiatric:         Mood and Affect: Mood normal.         Behavior: Behavior normal.         Thought Content: Thought content normal.         Judgment: Judgment normal.           Imaging Results              X-Ray Chest AP Portable (Final result)  Result time 12/28/24 13:20:37      Final result by Jillian Collazo MD (12/28/24 13:20:37)                   Impression:      No acute cardiopulmonary abnormality.      Electronically signed by: Jillian Collazo  Date:    12/28/2024  Time:    13:20               Narrative:    EXAMINATION:  XR CHEST AP PORTABLE    CLINICAL HISTORY:  Asthma;    FINDINGS:  Portable chest at 13:03 hours is compared to 12/15/2024 shows normal cardiomediastinal silhouette.    Lungs are clear. Pulmonary vasculature is normal. No acute osseous abnormality.                                          Labs     No results for input(s): "WBC", "HGB", "HCT", "PLT", "BAND", "METAMYELOCYT", "MYELOPCT", "HGBA1C" in the last 24 hours.    Recent Labs   Lab 12/31/24  0449      K 4.3      CO2 28   BUN 23*   CREATININE 0.9   *   CALCIUM 8.8   MG 2.0   PHOS 3.1     Recent Labs   Lab 12/30/24  1134   PH 7.388   PCO2 36.5   PO2 96   HCO3 22.0*       Microbiology Results (last 7 days)       Procedure Component Value Units Date/Time    Respiratory Infection Panel (PCR), Nasopharyngeal [[id number]] Collected: 12/28/24 1943    Order Status: Completed Specimen: Nasopharyngeal Swab Updated: 12/28/24 2116     Respiratory Infection Panel Source NP swab     Adenovirus Not Detected     Coronavirus 229E, Common Cold Virus Not Detected     Coronavirus HKU1, Common Cold Virus Not Detected     Coronavirus NL63, Common Cold Virus Not Detected     Coronavirus OC43, Common Cold " Virus Not Detected     Comment: Coronavirus strains 229E, HKU1, NL63, and OC43 can cause the common   cold   and are not associated with the respiratory disease outbreak caused   by  the COVID-19 (SARS-CoV-2 novel Coronavirus) strain.           SARS-CoV2 (COVID-19) Qualitative PCR Not Detected     Human Metapneumovirus Not Detected     Human Rhinovirus/Enterovirus Not Detected     Influenza A (subtypes H1, H1-2009,H3) Not Detected     Influenza B Not Detected     Parainfluenza Virus 1 Not Detected     Parainfluenza Virus 2 Not Detected     Parainfluenza Virus 3 Not Detected     Parainfluenza Virus 4 Not Detected     Respiratory Syncytial Virus Not Detected     Bordetella Parapertussis (RZ4728) Not Detected     Bordetella pertussis (ptxP) Not Detected     Chlamydia pneumoniae Not Detected     Mycoplasma pneumoniae Not Detected     Comment: Respiratory Infection Panel testing performed by Multiplex PCR.       Narrative:      Respiratory Infection Panel source->NP Swab    Respiratory Infection Panel (PCR), Nasopharyngeal [[id number]]     Order Status: No result Specimen: Nasopharyngeal Swab             Impression:  Active Hospital Problems    Diagnosis  POA    *Asthma exacerbation [J45.901]  Yes    Pleuritic chest pain [R07.81]  No    Swelling of lower extremity [M79.89]  Yes    Morbid obesity [E66.01]  Yes    Essential hypertension [I10]  Yes    Acute hypoxic respiratory failure [J96.01]  Yes      Resolved Hospital Problems   No resolved problems to display.               Plan:       - BiPAP as needed, O2 as able  - continue steroids  - continue nebulized bronchodilators- q4h with q2h prn  - must have asthma control inhaler started on discharge  - weight loss is essential to her care  - should be screened for FAVIAN when stable and an outpt  - increase activity  - hopefully ready for DC in the next day or so      Favio Guzman MD  [facility] Pulmonary/Critical Care  12/31/2024

## 2024-12-31 NOTE — CARE UPDATE
12/31/24 0656   Patient Assessment/Suction   Level of Consciousness (AVPU) alert   Respiratory Effort Normal;Unlabored   All Lung Fields Breath Sounds Anterior:;Lateral:;diminished   Rhythm/Pattern, Respiratory unlabored;pattern regular;depth regular   Cough Frequency no cough   Skin Integrity   $ Wound Care Tech Time 15 min   Area Observed Bridge of nose   Skin Appearance without discoloration   PRE-TX-O2   Device (Oxygen Therapy) BIPAP   $ Is the patient on High Flow Oxygen? Yes   Oxygen Concentration (%) 21   SpO2 (!) 94 %   Pulse Oximetry Type Continuous   $ Pulse Oximetry - Multiple Charge Pulse Oximetry - Multiple   Pulse 80   Resp 19   Aerosol Therapy   $ Aerosol Therapy Charges Aerosol Treatment   Daily Review of Necessity (SVN) completed   Respiratory Treatment Status (SVN) given   Treatment Route (SVN) mask;oxygen;in-line  (bipap)   Patient Position HOB elevated   Post Treatment Assessment (SVN) breath sounds improved   Signs of Intolerance (SVN) none   Breath Sounds Post-Respiratory Treatment   Throughout All Fields Post-Treatment All Fields   Throughout All Fields Post-Treatment aeration increased   Post-treatment Heart Rate (beats/min) 81   Post-treatment Resp Rate (breaths/min) 19   Ready to Wean/Extubation Screen   FIO2<=50 (chart decimal) 0.21   Preset CPAP/BiPAP Settings   Mode Of Delivery BiPAP S/T   $ CPAP/BiPAP Daily Charge 1   CPAP/BIPAP charged w/in last 24 h YES   $ Initial CPAP/BiPAP Setup? No   $ Is patient using? Yes   Size of Mask Medium/Large   Sized Appropriately? Yes   Equipment Type V60   Airway Device Type medium full face mask   Ipap 12   EPAP (cm H2O) 5   Pressure Support (cm H2O) 7   Set Rate (Breaths/Min) 16   ITime (sec) 1   Rise Time (sec) 3   Patient CPAP/BiPAP Settings   CPAP/BIPAP ID 13   RR Total (Breaths/Min) 17   Tidal Volume (mL) 832   VE Minute Ventilation (L/min) 14.4 L/min   Peak Inspiratory Pressure (cm H2O) 12   TiTOT (%) 28   Patient Trigger - ST Mode Only (%) 93    Education   $ Education 15 min;Bronchodilator

## 2024-12-31 NOTE — PROGRESS NOTES
[facility]  Progress Note    Patient Name: Ifrah Esquivel  MRN: [id number]  Patient Class: IP- Inpatient   Admission Date: 12/28/2024  Length of Stay: 2 days  Attending Physician: Loly Kim, *  Primary Care Provider: Laura, Primary Doctor        Subjective     Principal Problem:Asthma exacerbation        HPI:  Ms. Esquivel is a 39 yr old female with a hx of obesity, HTN, asthma, tobacco use, bipolar disorder, anxiety, PTSD who presents to ED with a chief complaint of shortness of breath.  Patient states that initially her symptoms began as a dry cough 3 days ago and the next day she began having shortness of breath.  She does endorse being out in the cold weather and rain recently prior to symptom onset.  She states over the last 3 days her symptoms have progressively worsened.  She states that any kind of exertion exacerbates her symptoms.  She also endorses associated wheezing and lightheadedness.  She has tried her home inhalers and nebulizer treatments with minimal relief.  She smokes about half a pack of cigarettes a day and denies alcohol and recreational drug use.  She denies fever, chills, chest pain, abdominal pain, nausea, vomiting, diarrhea, dysuria, hematuria, syncope.    Upon arrival to ED, patient afebrile, HR of 98, RR of 24, BP of 127/94, satting 97% on RA.  Initial labs in the ED unremarkable.  CXR with no acute cardiopulmonary abnormality.  Patient was given budesonide 0.5 mg nebulization, ipratropium 0.5 mg nebulization, levalbuterol 1.25 mg nebulization x2, lorazepam 0.5 mg IV, Mag sulfate 2 g IV, methylprednisolone 125 mg IV, 250 mL NS bolus while in the ED. even after nebulization treatments patient was still noted to have increased work of breathing and decreased air movement and was placed on continuous BiPAP.  Patient stayed on BiPAP for about 3 hours according to documented vitals in the ED and seemed to have improved with better air movement throughout  lung fields.  Discussed case with ED provider and patient will be placed under observation for further management.    Overview/Hospital Course:  Ms. Esquivel is a 33-year-old female with history of obesity, asthma, tobacco use, anxiety presented with chief complaint of shortness of breath.  She was admitted for severe asthma exacerbation requiring IV magnesium , and nebulizations multiple times.  She required continue BiPAP, was continued on IV steroids and pulmonology was consulted.  Eventually her respiratory status improved, required BiPAP intermittently.  She also had edema of her hands and legs for which 2D echocardiogram obtained that showed preserved EF and normal diastolic function.  Bilateral lower extremity ultrasound was negative for DVT.  D-dimer was elevated, given her presentation with hypoxia and shortness of breaths, CTA PE protocol was ordered that was negative for pulmonary embolism.  With IV steroids, she continued to have anasarca for which IV Lasix was started.    Interval History:  Patient is seen and examined at bedside.  She has improved.  But still has significant wheezing, continues to be on IV Steroids    For anasarca, started lasix.     Close monitoring in step-down while requiring frequent BiPAP    Review of Systems  Objective:     Vital Signs (Most Recent):  Temp: 97.9 °F (36.6 °C) (12/31/24 1300)  Pulse: 90 (12/31/24 1439)  Resp: 20 (12/31/24 1439)  BP: 114/75 (12/31/24 1345)  SpO2: (!) 93 % (12/31/24 1439) Vital Signs (24h Range):  Temp:  [97.7 °F (36.5 °C)-98.8 °F (37.1 °C)] 97.9 °F (36.6 °C)  Pulse:  [] 90  Resp:  [17-25] 20  SpO2:  [89 %-96 %] 93 %  BP: (114-159)/(65-97) 114/75     Weight: (!) 185.1 kg (408 lb)  Body mass index is 70.03 kg/m².    Intake/Output Summary (Last 24 hours) at 12/31/2024 1530  Last data filed at 12/31/2024 0950  Gross per 24 hour   Intake 840 ml   Output --   Net 840 ml         Physical Exam  Vitals reviewed.   Constitutional:       General: She is  awake. She is not in acute distress.     Appearance: Normal appearance. She is obese. She is not ill-appearing or diaphoretic.      Comments: Patient still audibly wheezing.   Cardiovascular:      Rate and Rhythm: Normal rate.      Heart sounds: Normal heart sounds. No murmur heard.     No friction rub. No gallop.   Pulmonary:      Effort: Pulmonary effort is normal. No respiratory distress.      Breath sounds: Wheezing (Diffuse expiratory wheezing to bilateral lungs) present.   Abdominal:      General: Bowel sounds are normal.      Palpations: Abdomen is soft.      Tenderness: There is no abdominal tenderness. There is no guarding or rebound.   Musculoskeletal:      Right lower leg: Edema present.      Left lower leg: Edema present.   Skin:     General: Skin is warm and dry.   Neurological:      Mental Status: She is alert and oriented to person, place, and time.   Psychiatric:         Behavior: Behavior is cooperative.             Significant Labs: All pertinent labs within the past 24 hours have been reviewed.    Significant Imaging: I have reviewed all pertinent imaging results/findings within the past 24 hours.    Assessment and Plan     * Asthma exacerbation  - Presenting to the ED with a chief complaint of shortness of breath, found to be in asthma exacerbation  - Multiple nebulization treatments, lorazepam, Mag sulfate, and methylprednisolone given.  - Ellen scheduled  - Methylprednisolone IV, pulmonology tapering the dose  - COVID and flu negative  - Respiratory panel negative  - Pulmonology consulted, appreciate recs  - Respiratory eval and treat, appreciate recs  - improved respiratory status, BiPAP support as needed  - repeat ABG-unable to obtain with her body habitus, VBG ordered  - if she worsens, she will eventually require intubation    Swelling of lower extremity  - duplex venous ultrasound negative for DVT  - obesity is high-risk  - BNP normal  - echo normal systolic and diastolic function  - IV  "steroids can cause fluid retention as well  - IV Lasix 40 mg      Pleuritic chest pain  - likely secondary to her acute asthma exacerbation  - given lower extremity swelling, high probability of PE, CTA PE protocol obtained and negative for PE  - telemetry   -2D echocardiogram showed normal systolic and diastolic function  -troponins negative x2    Resolved      Acute hypoxic respiratory failure  -likely secondary to asthma exacerbation, treat as above  -BiPAP support as needed  -repeat ABG:  Unable to obtain, repeat VBG ordered  -pulmonology following, appreciate their recommendations  -2D echocardiogram ordered given bilateral lower extremity edema - normal systolic and diastolic function      Essential hypertension  Latest blood pressure and vitals reviewed-   Temp:  [97.7 °F (36.5 °C)-98.8 °F (37.1 °C)]   Pulse:  []   Resp:  [17-25]   BP: (114-159)/(65-97)   SpO2:  [89 %-96 %] .     - Currently no antihypertensives on home med list  - PRN hydralazine    Morbid obesity  Body mass index is 70.03 kg/m². Morbid obesity complicates all aspects of disease management from diagnostic modalities to treatment.   Likely has component of obesity hypoventilation syndrome  Conselled weight loss        VTE Risk Mitigation (From admission, onward)           Ordered     enoxaparin injection 60 mg  Every 12 hours        Note to Pharmacy: Ht: 5' 4" (1.626 m)  Wt: (!) 176.3 kg (388 lb 11.2 oz)  Estimated Creatinine Clearance: 157 mL/min (based on SCr of 0.8 mg/dL).  Body mass index is 66.72 kg/m².    12/30/24 0326     IP VTE HIGH RISK PATIENT  Once         12/28/24 1810     Place sequential compression device  Until discontinued         12/28/24 1810                    Discharge Planning   NATALIE: 1/2/2025     Code Status: Full Code   Medical Readiness for Discharge Date:   Discharge Plan A: Home with family                        Loly Kim MD  Department of Hospital Medicine   [facility]    "

## 2024-12-31 NOTE — ASSESSMENT & PLAN NOTE
-likely secondary to asthma exacerbation, treat as above  -BiPAP support as needed  -repeat ABG:  Unable to obtain, repeat VBG ordered  -pulmonology following, appreciate their recommendations  -2D echocardiogram ordered given bilateral lower extremity edema - normal systolic and diastolic function

## 2025-01-01 PROBLEM — R91.8 GROUND GLASS OPACITY PRESENT ON IMAGING OF LUNG: Status: ACTIVE | Noted: 2025-01-01

## 2025-01-01 LAB
ANION GAP SERPL CALC-SCNC: 5 MMOL/L (ref 8–16)
BASOPHILS # BLD AUTO: 0.02 K/UL (ref 0–0.2)
BASOPHILS NFR BLD: 0.2 % (ref 0–1.9)
BUN SERPL-MCNC: 34 MG/DL (ref 6–20)
CALCIUM SERPL-MCNC: 8.7 MG/DL (ref 8.7–10.5)
CHLORIDE SERPL-SCNC: 101 MMOL/L (ref 95–110)
CO2 SERPL-SCNC: 28 MMOL/L (ref 23–29)
CREAT SERPL-MCNC: 0.8 MG/DL (ref 0.5–1.4)
DIFFERENTIAL METHOD BLD: ABNORMAL
EOSINOPHIL # BLD AUTO: 0 K/UL (ref 0–0.5)
EOSINOPHIL NFR BLD: 0 % (ref 0–8)
ERYTHROCYTE [DISTWIDTH] IN BLOOD BY AUTOMATED COUNT: 14.8 % (ref 11.5–14.5)
EST. GFR  (NO RACE VARIABLE): >60 ML/MIN/1.73 M^2
GLUCOSE SERPL-MCNC: 176 MG/DL (ref 70–110)
HCT VFR BLD AUTO: 38.6 % (ref 37–48.5)
HGB BLD-MCNC: 12.5 G/DL (ref 12–16)
IMM GRANULOCYTES # BLD AUTO: 0.31 K/UL (ref 0–0.04)
IMM GRANULOCYTES NFR BLD AUTO: 2.5 % (ref 0–0.5)
LYMPHOCYTES # BLD AUTO: 1.6 K/UL (ref 1–4.8)
LYMPHOCYTES NFR BLD: 13 % (ref 18–48)
MAGNESIUM SERPL-MCNC: 2.2 MG/DL (ref 1.6–2.6)
MCH RBC QN AUTO: 29.8 PG (ref 27–31)
MCHC RBC AUTO-ENTMCNC: 32.4 G/DL (ref 32–36)
MCV RBC AUTO: 92 FL (ref 82–98)
MONOCYTES # BLD AUTO: 0.5 K/UL (ref 0.3–1)
MONOCYTES NFR BLD: 4.4 % (ref 4–15)
NEUTROPHILS # BLD AUTO: 9.8 K/UL (ref 1.8–7.7)
NEUTROPHILS NFR BLD: 79.9 % (ref 38–73)
NRBC BLD-RTO: 0 /100 WBC
PHOSPHATE SERPL-MCNC: 2.3 MG/DL (ref 2.7–4.5)
PHOSPHATE SERPL-MCNC: 2.5 MG/DL (ref 2.7–4.5)
PLATELET # BLD AUTO: 315 K/UL (ref 150–450)
PMV BLD AUTO: 9.5 FL (ref 9.2–12.9)
POTASSIUM SERPL-SCNC: 4.2 MMOL/L (ref 3.5–5.1)
RBC # BLD AUTO: 4.2 M/UL (ref 4–5.4)
SODIUM SERPL-SCNC: 134 MMOL/L (ref 136–145)
WBC # BLD AUTO: 12.24 K/UL (ref 3.9–12.7)

## 2025-01-01 PROCEDURE — 93010 ELECTROCARDIOGRAM REPORT: CPT | Mod: ,,, | Performed by: INTERNAL MEDICINE

## 2025-01-01 PROCEDURE — 99900031 HC PATIENT EDUCATION (STAT)

## 2025-01-01 PROCEDURE — 25000003 PHARM REV CODE 250

## 2025-01-01 PROCEDURE — 80048 BASIC METABOLIC PNL TOTAL CA: CPT

## 2025-01-01 PROCEDURE — 63600175 PHARM REV CODE 636 W HCPCS

## 2025-01-01 PROCEDURE — 94660 CPAP INITIATION&MGMT: CPT

## 2025-01-01 PROCEDURE — 84100 ASSAY OF PHOSPHORUS: CPT | Mod: 91

## 2025-01-01 PROCEDURE — 27100171 HC OXYGEN HIGH FLOW UP TO 24 HOURS

## 2025-01-01 PROCEDURE — 93005 ELECTROCARDIOGRAM TRACING: CPT | Performed by: INTERNAL MEDICINE

## 2025-01-01 PROCEDURE — 94761 N-INVAS EAR/PLS OXIMETRY MLT: CPT

## 2025-01-01 PROCEDURE — 21000000 HC CCU ICU ROOM CHARGE

## 2025-01-01 PROCEDURE — 63600175 PHARM REV CODE 636 W HCPCS: Mod: JZ | Performed by: INTERNAL MEDICINE

## 2025-01-01 PROCEDURE — 94640 AIRWAY INHALATION TREATMENT: CPT

## 2025-01-01 PROCEDURE — 84100 ASSAY OF PHOSPHORUS: CPT

## 2025-01-01 PROCEDURE — 99900035 HC TECH TIME PER 15 MIN (STAT)

## 2025-01-01 PROCEDURE — 36415 COLL VENOUS BLD VENIPUNCTURE: CPT

## 2025-01-01 PROCEDURE — 83735 ASSAY OF MAGNESIUM: CPT

## 2025-01-01 PROCEDURE — 25000242 PHARM REV CODE 250 ALT 637 W/ HCPCS

## 2025-01-01 PROCEDURE — 99232 SBSQ HOSP IP/OBS MODERATE 35: CPT | Mod: ,,, | Performed by: INTERNAL MEDICINE

## 2025-01-01 PROCEDURE — 85025 COMPLETE CBC W/AUTO DIFF WBC: CPT

## 2025-01-01 RX ORDER — PANTOPRAZOLE SODIUM 40 MG/1
40 TABLET, DELAYED RELEASE ORAL DAILY
Status: DISCONTINUED | OUTPATIENT
Start: 2025-01-01 | End: 2025-01-02 | Stop reason: HOSPADM

## 2025-01-01 RX ORDER — METHYLPREDNISOLONE SOD SUCC 125 MG
60 VIAL (EA) INJECTION DAILY
Status: DISCONTINUED | OUTPATIENT
Start: 2025-01-02 | End: 2025-01-02 | Stop reason: HOSPADM

## 2025-01-01 RX ORDER — LORAZEPAM 2 MG/ML
1 INJECTION INTRAMUSCULAR ONCE
Status: COMPLETED | OUTPATIENT
Start: 2025-01-01 | End: 2025-01-01

## 2025-01-01 RX ADMIN — LORAZEPAM 1 MG: 2 INJECTION INTRAMUSCULAR; INTRAVENOUS at 09:01

## 2025-01-01 RX ADMIN — POTASSIUM & SODIUM PHOSPHATES POWDER PACK 280-160-250 MG 2 PACKET: 280-160-250 PACK at 06:01

## 2025-01-01 RX ADMIN — ENOXAPARIN SODIUM 60 MG: 60 INJECTION SUBCUTANEOUS at 08:01

## 2025-01-01 RX ADMIN — LORAZEPAM 1 MG: 2 INJECTION, SOLUTION INTRAMUSCULAR; INTRAVENOUS at 12:01

## 2025-01-01 RX ADMIN — METHYLPREDNISOLONE SODIUM SUCCINATE 40 MG: 40 INJECTION, POWDER, FOR SOLUTION INTRAMUSCULAR; INTRAVENOUS at 12:01

## 2025-01-01 RX ADMIN — IPRATROPIUM BROMIDE AND ALBUTEROL SULFATE 3 ML: .5; 3 SOLUTION RESPIRATORY (INHALATION) at 04:01

## 2025-01-01 RX ADMIN — PANTOPRAZOLE SODIUM 40 MG: 40 TABLET, DELAYED RELEASE ORAL at 04:01

## 2025-01-01 RX ADMIN — GUAIFENESIN 200 MG: 200 SOLUTION ORAL at 08:01

## 2025-01-01 RX ADMIN — ENOXAPARIN SODIUM 60 MG: 60 INJECTION SUBCUTANEOUS at 09:01

## 2025-01-01 RX ADMIN — MORPHINE SULFATE 1 MG: 2 INJECTION, SOLUTION INTRAMUSCULAR; INTRAVENOUS at 04:01

## 2025-01-01 RX ADMIN — FUROSEMIDE 40 MG: 10 INJECTION, SOLUTION INTRAMUSCULAR; INTRAVENOUS at 09:01

## 2025-01-01 RX ADMIN — QUETIAPINE 300 MG: 100 TABLET ORAL at 08:01

## 2025-01-01 RX ADMIN — IPRATROPIUM BROMIDE AND ALBUTEROL SULFATE 3 ML: .5; 3 SOLUTION RESPIRATORY (INHALATION) at 12:01

## 2025-01-01 RX ADMIN — IPRATROPIUM BROMIDE AND ALBUTEROL SULFATE 3 ML: .5; 3 SOLUTION RESPIRATORY (INHALATION) at 07:01

## 2025-01-01 RX ADMIN — METHYLPREDNISOLONE SODIUM SUCCINATE 80 MG: 40 INJECTION, POWDER, FOR SOLUTION INTRAMUSCULAR; INTRAVENOUS at 03:01

## 2025-01-01 RX ADMIN — LORAZEPAM 1 MG: 2 INJECTION INTRAMUSCULAR; INTRAVENOUS at 08:01

## 2025-01-01 RX ADMIN — MORPHINE SULFATE 1 MG: 2 INJECTION, SOLUTION INTRAMUSCULAR; INTRAVENOUS at 09:01

## 2025-01-01 RX ADMIN — POTASSIUM & SODIUM PHOSPHATES POWDER PACK 280-160-250 MG 2 PACKET: 280-160-250 PACK at 10:01

## 2025-01-01 RX ADMIN — ALUMINUM HYDROXIDE, MAGNESIUM HYDROXIDE, AND SIMETHICONE 30 ML: 200; 200; 20 SUSPENSION ORAL at 04:01

## 2025-01-01 RX ADMIN — ALUMINUM HYDROXIDE, MAGNESIUM HYDROXIDE, AND SIMETHICONE 30 ML: 200; 200; 20 SUSPENSION ORAL at 09:01

## 2025-01-01 RX ADMIN — MORPHINE SULFATE 1 MG: 2 INJECTION, SOLUTION INTRAMUSCULAR; INTRAVENOUS at 10:01

## 2025-01-01 NOTE — CARE UPDATE
12/31/24 1941   Patient Assessment/Suction   Level of Consciousness (AVPU) alert   Respiratory Effort Normal;Unlabored   Expansion/Accessory Muscles/Retractions no retractions;no use of accessory muscles   KEVIN Breath Sounds diminished   LLL Breath Sounds diminished   RUL Breath Sounds diminished   RML Breath Sounds diminished   RLL Breath Sounds diminished   Rhythm/Pattern, Respiratory unlabored;pattern regular;no shortness of breath reported   Cough Frequency no cough   Aerosol Therapy   $ Aerosol Therapy Charges Aerosol Treatment   Daily Review of Necessity (SVN) completed   Respiratory Treatment Status (SVN) given   Treatment Route (SVN) in-line   Patient Position HOB elevated   Post Treatment Assessment (SVN) increased aeration   Signs of Intolerance (SVN) none   Preset CPAP/BiPAP Settings   Mode Of Delivery BiPAP S/T   CPAP/BIPAP charged w/in last 24 h YES   $ Is patient using? Yes   Size of Mask Medium/Large   Sized Appropriately? Yes   Equipment Type V60   Airway Device Type medium full face mask   Ipap 12   EPAP (cm H2O) 5   Pressure Support (cm H2O) 7   Set Rate (Breaths/Min) 16   ITime (sec) 1   Rise Time (sec) 3   Patient CPAP/BiPAP Settings   CPAP/BIPAP ID 13   RR Total (Breaths/Min) 23   Tidal Volume (mL) 1021   VE Minute Ventilation (L/min) 24 L/min   Peak Inspiratory Pressure (cm H2O) 14   TiTOT (%) 30   Patient Trigger - ST Mode Only (%) 95   CPAP/BiPAP Alarms   High Pressure (cm H2O) 40   Low Pressure (cm H2O) 5   Minute Ventilation (L/Min) 3   High RR (breaths/min) 45   Low RR (breaths/min) 8   Apnea (Sec) 20   Education   $ Education Bronchodilator

## 2025-01-01 NOTE — CARE UPDATE
01/01/25 0748   Patient Assessment/Suction   Level of Consciousness (AVPU) alert   Respiratory Effort Normal;Unlabored   Expansion/Accessory Muscles/Retractions no use of accessory muscles   All Lung Fields Breath Sounds Anterior:;Lateral:;clear;diminished   Rhythm/Pattern, Respiratory unlabored;pattern regular;depth regular   Cough Frequency no cough   PRE-TX-O2   Device (Oxygen Therapy) BIPAP   $ Is the patient on High Flow Oxygen? Yes   Oxygen Concentration (%) 21   SpO2 96 %   Pulse Oximetry Type Continuous   $ Pulse Oximetry - Multiple Charge Pulse Oximetry - Multiple   Oximetry Probe Status Assessed   Pulse 92   Resp (!) 22   Aerosol Therapy   $ Aerosol Therapy Charges Aerosol Treatment   Daily Review of Necessity (SVN) completed   Respiratory Treatment Status (SVN) given   Treatment Route (SVN) in-line   Patient Position HOB elevated   Post Treatment Assessment (SVN) increased aeration   Signs of Intolerance (SVN) none   Breath Sounds Post-Respiratory Treatment   Throughout All Fields Post-Treatment All Fields   Throughout All Fields Post-Treatment aeration increased   Post-treatment Heart Rate (beats/min) 84   Post-treatment Resp Rate (breaths/min) 20   Preset CPAP/BiPAP Settings   Mode Of Delivery BiPAP S/T   $ CPAP/BiPAP Daily Charge 1   CPAP/BIPAP charged w/in last 24 h YES   $ Initial CPAP/BiPAP Setup? No   $ Is patient using? Yes   Size of Mask Medium/Large   Sized Appropriately? Yes   Equipment Type V60   Airway Device Type medium full face mask   Ipap 12   EPAP (cm H2O) 5   Pressure Support (cm H2O) 7   Set Rate (Breaths/Min) 16   ITime (sec) 1   Rise Time (sec) 3   Patient CPAP/BiPAP Settings   CPAP/BIPAP ID 13   Timed Inspiration (Sec) 1   IPAP Rise Time (sec) 3   RR Total (Breaths/Min) 22   Tidal Volume (mL) 1145   VE Minute Ventilation (L/min) 23 L/min   Peak Inspiratory Pressure (cm H2O) 21   TiTOT (%) 26   Total Leak (L/Min) 31   Patient Trigger - ST Mode Only (%) 95   CPAP/BiPAP Backup  Settings   IPAP Backup 12 cmH2O   EPAP Backup 5 cmH2O   Backup Rate 16 breaths per minute (bpm)   FIO2 Backup 28 %   ITIME Backup 1 seconds   Rise Time Backup 3 seconds   CPAP/BiPAP Alarms   High Pressure (cm H2O) 40   Low Pressure (cm H2O) 8   Minute Ventilation (L/Min) 5   High RR (breaths/min) 40   Low RR (breaths/min) 8   Apnea (Sec) 20   Education   $ Education Bronchodilator;Oxygen

## 2025-01-01 NOTE — SUBJECTIVE & OBJECTIVE
Interval History:  Patient is seen and examined at bedside.  She has improved.  Deescalate steroid dosing.      Discussed with pulmonology, they mentioned, while the patient was on BiPAP and while asleep, wheezing improved, concern of upper airway narrowing given the discrepancy between lung sounds.      Updated  for outpatient pulmonology and ENT referral.  She also needs sleep studies.    Review of Systems  Objective:     Vital Signs (Most Recent):  Temp: 97.5 °F (36.4 °C) (01/01/25 0701)  Pulse: 89 (01/01/25 1300)  Resp: 19 (01/01/25 1300)  BP: (!) 166/90 (01/01/25 1300)  SpO2: 96 % (01/01/25 1300) Vital Signs (24h Range):  Temp:  [97.5 °F (36.4 °C)-98.2 °F (36.8 °C)] 97.5 °F (36.4 °C)  Pulse:  [] 89  Resp:  [] 19  SpO2:  [91 %-98 %] 96 %  BP: (117-170)/(58-93) 166/90     Weight: (!) 188.7 kg (416 lb 1.6 oz)  Body mass index is 71.42 kg/m².    Intake/Output Summary (Last 24 hours) at 1/1/2025 1526  Last data filed at 1/1/2025 1429  Gross per 24 hour   Intake 1160 ml   Output --   Net 1160 ml         Physical Exam  Vitals reviewed.   Constitutional:       General: She is awake. She is not in acute distress.     Appearance: Normal appearance. She is obese. She is not ill-appearing or diaphoretic.   Cardiovascular:      Rate and Rhythm: Normal rate.      Heart sounds: Normal heart sounds. No murmur heard.     No friction rub. No gallop.   Pulmonary:      Effort: Pulmonary effort is normal. No respiratory distress.      Breath sounds: Wheezing (Diffuse expiratory wheezing to bilateral lungs) present.      Comments: Improving, but still present  Abdominal:      General: Bowel sounds are normal.      Palpations: Abdomen is soft.      Tenderness: There is no abdominal tenderness. There is no guarding or rebound.   Musculoskeletal:      Right lower leg: Edema present.      Left lower leg: Edema present.   Skin:     General: Skin is warm and dry.   Neurological:      Mental Status: She is alert and  oriented to person, place, and time.   Psychiatric:         Behavior: Behavior is cooperative.             Significant Labs: All pertinent labs within the past 24 hours have been reviewed.    Significant Imaging: I have reviewed all pertinent imaging results/findings within the past 24 hours.

## 2025-01-01 NOTE — PROGRESS NOTES
[facility]  Progress Note    Patient Name: Ifrah Esquivel  MRN: [id number]  Patient Class: IP- Inpatient   Admission Date: 12/28/2024  Length of Stay: 3 days  Attending Physician: Loly Kim, *  Primary Care Provider: Laura, Primary Doctor        Subjective     Principal Problem:Asthma exacerbation        HPI:  Ms. Esquivel is a 39 yr old female with a hx of obesity, HTN, asthma, tobacco use, bipolar disorder, anxiety, PTSD who presents to ED with a chief complaint of shortness of breath.  Patient states that initially her symptoms began as a dry cough 3 days ago and the next day she began having shortness of breath.  She does endorse being out in the cold weather and rain recently prior to symptom onset.  She states over the last 3 days her symptoms have progressively worsened.  She states that any kind of exertion exacerbates her symptoms.  She also endorses associated wheezing and lightheadedness.  She has tried her home inhalers and nebulizer treatments with minimal relief.  She smokes about half a pack of cigarettes a day and denies alcohol and recreational drug use.  She denies fever, chills, chest pain, abdominal pain, nausea, vomiting, diarrhea, dysuria, hematuria, syncope.    Upon arrival to ED, patient afebrile, HR of 98, RR of 24, BP of 127/94, satting 97% on RA.  Initial labs in the ED unremarkable.  CXR with no acute cardiopulmonary abnormality.  Patient was given budesonide 0.5 mg nebulization, ipratropium 0.5 mg nebulization, levalbuterol 1.25 mg nebulization x2, lorazepam 0.5 mg IV, Mag sulfate 2 g IV, methylprednisolone 125 mg IV, 250 mL NS bolus while in the ED. even after nebulization treatments patient was still noted to have increased work of breathing and decreased air movement and was placed on continuous BiPAP.  Patient stayed on BiPAP for about 3 hours according to documented vitals in the ED and seemed to have improved with better air movement throughout  lung fields.  Discussed case with ED provider and patient will be placed under observation for further management.    Overview/Hospital Course:  Ms. Esquivel is a 33-year-old female with history of obesity, asthma, tobacco use, anxiety presented with chief complaint of shortness of breath.  She was admitted for severe asthma exacerbation requiring IV magnesium , and nebulizations multiple times.  She required continue BiPAP, was continued on IV steroids and pulmonology was consulted.  Eventually her respiratory status improved, required BiPAP intermittently.  She also had edema of her hands and legs for which 2D echocardiogram obtained that showed preserved EF and normal diastolic function.  Bilateral lower extremity ultrasound was negative for DVT.  D-dimer was elevated, given her presentation with hypoxia and shortness of breaths, CTA PE protocol was ordered that was negative for pulmonary embolism.  With IV steroids, she continued to have anasarca for which IV Lasix was started.    Interval History:  Patient is seen and examined at bedside.  She has improved.  Deescalate steroid dosing.      Discussed with pulmonology, they mentioned, while the patient was on BiPAP and while asleep, wheezing improved, concern of upper airway narrowing given the discrepancy between lung sounds.      Updated  for outpatient pulmonology and ENT referral.  She also needs sleep studies.    Review of Systems  Objective:     Vital Signs (Most Recent):  Temp: 97.5 °F (36.4 °C) (01/01/25 0701)  Pulse: 89 (01/01/25 1300)  Resp: 19 (01/01/25 1300)  BP: (!) 166/90 (01/01/25 1300)  SpO2: 96 % (01/01/25 1300) Vital Signs (24h Range):  Temp:  [97.5 °F (36.4 °C)-98.2 °F (36.8 °C)] 97.5 °F (36.4 °C)  Pulse:  [] 89  Resp:  [] 19  SpO2:  [91 %-98 %] 96 %  BP: (117-170)/(58-93) 166/90     Weight: (!) 188.7 kg (416 lb 1.6 oz)  Body mass index is 71.42 kg/m².    Intake/Output Summary (Last 24 hours) at 1/1/2025 1526  Last data  filed at 1/1/2025 1429  Gross per 24 hour   Intake 1160 ml   Output --   Net 1160 ml         Physical Exam  Vitals reviewed.   Constitutional:       General: She is awake. She is not in acute distress.     Appearance: Normal appearance. She is obese. She is not ill-appearing or diaphoretic.   Cardiovascular:      Rate and Rhythm: Normal rate.      Heart sounds: Normal heart sounds. No murmur heard.     No friction rub. No gallop.   Pulmonary:      Effort: Pulmonary effort is normal. No respiratory distress.      Breath sounds: Wheezing (Diffuse expiratory wheezing to bilateral lungs) present.      Comments: Improving, but still present  Abdominal:      General: Bowel sounds are normal.      Palpations: Abdomen is soft.      Tenderness: There is no abdominal tenderness. There is no guarding or rebound.   Musculoskeletal:      Right lower leg: Edema present.      Left lower leg: Edema present.   Skin:     General: Skin is warm and dry.   Neurological:      Mental Status: She is alert and oriented to person, place, and time.   Psychiatric:         Behavior: Behavior is cooperative.             Significant Labs: All pertinent labs within the past 24 hours have been reviewed.    Significant Imaging: I have reviewed all pertinent imaging results/findings within the past 24 hours.    Assessment and Plan     * Asthma exacerbation  - Presenting to the ED with a chief complaint of shortness of breath, found to be in asthma exacerbation  - Multiple nebulization treatments, lorazepam, Mag sulfate, and methylprednisolone given.  - Ellen scheduled  - Methylprednisolone IV, tapering the dose  - COVID and flu negative  - Respiratory panel negative  - Pulmonology consulted, appreciate recs  - Respiratory eval and treat, appreciate recs    - improved respiratory status, BiPAP support as needed      Ground glass opacity present on imaging of lung  - repeat CT in 6 months   - out-patient follow up      Swelling of lower extremity  -  "duplex venous ultrasound negative for DVT  - obesity is high-risk  - BNP normal  - echo normal systolic and diastolic function  - IV steroids can cause fluid retention as well  - IV Lasix 40 mg      Pleuritic chest pain  - likely secondary to her acute asthma exacerbation  - given lower extremity swelling, high probability of PE, CTA PE protocol obtained and negative for PE  - telemetry   -2D echocardiogram showed normal systolic and diastolic function  -troponins negative x2    Resolved      Acute hypoxic respiratory failure  -likely secondary to asthma exacerbation, treat as above  -BiPAP support as needed  -repeat ABG:  Unable to obtain, repeat VBG ordered  -pulmonology following, appreciate their recommendations  -2D echocardiogram ordered given bilateral lower extremity edema - normal systolic and diastolic function      Essential hypertension  Latest blood pressure and vitals reviewed-   Temp:  [97.7 °F (36.5 °C)-98.8 °F (37.1 °C)]   Pulse:  []   Resp:  [17-25]   BP: (114-159)/(65-97)   SpO2:  [89 %-96 %] .     - Currently no antihypertensives on home med list  - PRN hydralazine    Morbid obesity  Body mass index is 70.03 kg/m². Morbid obesity complicates all aspects of disease management from diagnostic modalities to treatment.   Likely has component of obesity hypoventilation syndrome  Conselled weight loss        VTE Risk Mitigation (From admission, onward)           Ordered     enoxaparin injection 60 mg  Every 12 hours        Note to Pharmacy: Ht: 5' 4" (1.626 m)  Wt: (!) 176.3 kg (388 lb 11.2 oz)  Estimated Creatinine Clearance: 157 mL/min (based on SCr of 0.8 mg/dL).  Body mass index is 66.72 kg/m².    12/30/24 0326     IP VTE HIGH RISK PATIENT  Once         12/28/24 1810     Place sequential compression device  Until discontinued         12/28/24 1810                    Discharge Planning   NATALIE: 1/2/2025     Code Status: Full Code   Medical Readiness for Discharge Date:   Discharge Plan A: Home " with family                        Loly Kim MD  Department of Hospital Medicine   [facility]

## 2025-01-01 NOTE — ASSESSMENT & PLAN NOTE
- Presenting to the ED with a chief complaint of shortness of breath, found to be in asthma exacerbation  - Multiple nebulization treatments, lorazepam, Mag sulfate, and methylprednisolone given.  - Ellen scheduled  - Methylprednisolone IV, tapering the dose  - COVID and flu negative  - Respiratory panel negative  - Pulmonology consulted, appreciate recs  - Respiratory eval and treat, appreciate recs    - improved respiratory status, BiPAP support as needed

## 2025-01-01 NOTE — PROGRESS NOTES
01/01/2025      Admit Date: 12/28/2024  [facility]  Pulmonary/Critical Care   Progress Note    Chief Complaint   Patient presents with    Shortness of Breath     Patient short of breath x 2 days, hx of asthma and nebulizer not helping nor did breathing treatment at 10am - patient audibly wheezing in triage - sats are 97% but pt has increased wob         History of Present Illness:  Pt is a 40 yo female with obesity, HTN, asthma, tobacco use, bipolar disorder, anxiety, PTSD who presents to ED with a chief complaint of shortness of breath. Pulmonary is consulted for asthma exacerbation. Pt has been having cough about 2 days and worsening sob with wheezing, feeling weak and exhausted. She has been using albuterol inhaler and nebs at home but is not on a control inhaler. She has had lifelong asthma, denies intubation for asthma. Currently pt requiring BIPAP.    12/30/2024 - Feels about the same, wearing BiPAP, still feels tight and wheezing.  She reports that her primary MD has been taking care of her asthma and that she has not been o a controlling medication.  WBC has increased (? Leukemoid reaction due to steroids).  SHe reports that she has had asthma since childhood and has been allergy tested and treated as a child.  She has a dog as a pet. SHe says she has not been tested for FAVIAN.      12/31/2024 - Doing better, was able to be off of the BiPAP yesterday during the day.  No new complaints.  She is less anxious today (likely due to decreased steroids dose).  We discussed the need to get her on controlling medications at [place].    1/1/2025 - Stable overnight, sleeping on BiPAP when I saw her and in no distress.  I did not waken her.  No new issues reported.        PFSH:  Past Medical History:   Diagnosis Date    Anxiety     Asthma     Bipolar affective disorder, current episode manic with psychotic symptoms     Hypertension     Manic bipolar I disorder     Mental health disorder     PTSD (post-traumatic stress  disorder)     Substance abuse      Past Surgical History:   Procedure Laterality Date    APPENDECTOMY      c sections      x 3    CHOLECYSTECTOMY      ESOPHAGOGASTRODUODENOSCOPY N/A 9/15/2022    Procedure: EGD (ESOPHAGOGASTRODUODENOSCOPY);  Surgeon: Law Sheriff MD;  Location: [facility];  Service: Endoscopy;  Laterality: N/A;    HYSTERECTOMY      TONSILLECTOMY      tonsils and adenoidectomy       Social History     Tobacco Use    Smoking status: Every Day     Current packs/day: 0.50     Types: Cigarettes    Smokeless tobacco: Never   Substance Use Topics    Alcohol use: Never    Drug use: Yes     Types: Marijuana     Comment: daily     Family History   Problem Relation Name Age of Onset    Ovarian cancer Mother      Cancer Other m great gm         breast    No Known Problems Father      Diabetes Paternal Grandmother       Review of patient's allergies indicates:   Allergen Reactions    Asa [aspirin]     Aspirin Other (See Comments)     avoids due to asthma    Ultram [tramadol]     Ultram [tramadol] Nausea And Vomiting    Zofran [ondansetron hcl (pf)]     Zofran [ondansetron hcl (pf)] Other (See Comments)     Gives migraines       Performance Status:Performance Status:The patient's activity level is no limits with regular activity.    Review of Systems   Constitutional:  Positive for malaise/fatigue. Negative for chills, diaphoresis, fever and weight loss.   HENT:  Negative for congestion.    Eyes:  Negative for pain.   Respiratory:  Positive for cough, shortness of breath and wheezing. Negative for hemoptysis, sputum production and stridor.    Cardiovascular:  Negative for chest pain, palpitations, orthopnea, claudication, leg swelling and PND.   Gastrointestinal:  Negative for abdominal pain, constipation, diarrhea, heartburn, nausea and vomiting.   Genitourinary:  Negative for dysuria, frequency and urgency.   Musculoskeletal:  Positive for joint pain. Negative for falls and myalgias.   Neurological:   Negative for sensory change, focal weakness and weakness.   Psychiatric/Behavioral:  Negative for depression. The patient is nervous/anxious.          Physical Exam  Vitals and nursing note reviewed.   Constitutional:       General: She is not in acute distress.     Appearance: Normal appearance. She is obese. She is not ill-appearing, toxic-appearing or diaphoretic.      Comments: Wearing BiPAP  Anxious   HENT:      Head: Normocephalic and atraumatic.      Right Ear: External ear normal.      Left Ear: External ear normal.      Nose: Nose normal. No congestion or rhinorrhea.      Mouth/Throat:      Mouth: Mucous membranes are moist.      Pharynx: Oropharynx is clear. No oropharyngeal exudate or posterior oropharyngeal erythema.   Eyes:      General: No scleral icterus.        Right eye: No discharge.         Left eye: No discharge.      Extraocular Movements: Extraocular movements intact.      Conjunctiva/sclera: Conjunctivae normal.      Pupils: Pupils are equal, round, and reactive to light.   Neck:      Vascular: No carotid bruit.   Cardiovascular:      Rate and Rhythm: Normal rate and regular rhythm.      Pulses: Normal pulses.      Heart sounds: No murmur heard.     No friction rub. No gallop.   Pulmonary:      Effort: Pulmonary effort is normal. No respiratory distress.      Breath sounds: No stridor. No wheezing, rhonchi or rales.      Comments: BS much clearer this AM while sleeping  Chest:      Chest wall: No tenderness.   Abdominal:      General: Bowel sounds are normal. There is no distension.      Tenderness: There is no abdominal tenderness. There is no guarding.      Comments: obese   Musculoskeletal:         General: No swelling. Normal range of motion.      Cervical back: Normal range of motion and neck supple. No rigidity or tenderness.      Right lower leg: No edema.      Left lower leg: No edema.   Lymphadenopathy:      Cervical: No cervical adenopathy.   Skin:     General: Skin is warm and dry.  "     Capillary Refill: Capillary refill takes less than 2 seconds.      Coloration: Skin is not jaundiced.      Findings: No bruising.   Neurological:      General: No focal deficit present.      Mental Status: She is alert and oriented to person, place, and time. Mental status is at baseline.      Cranial Nerves: No cranial nerve deficit.      Sensory: No sensory deficit.      Motor: No weakness.   Psychiatric:         Mood and Affect: Mood normal.         Behavior: Behavior normal.         Thought Content: Thought content normal.         Judgment: Judgment normal.           Imaging Results              X-Ray Chest AP Portable (Final result)  Result time 12/28/24 13:20:37      Final result by Jillian Collazo MD (12/28/24 13:20:37)                   Impression:      No acute cardiopulmonary abnormality.      Electronically signed by: Jillian Collazo  Date:    12/28/2024  Time:    13:20               Narrative:    EXAMINATION:  XR CHEST AP PORTABLE    CLINICAL HISTORY:  Asthma;    FINDINGS:  Portable chest at 13:03 hours is compared to 12/15/2024 shows normal cardiomediastinal silhouette.    Lungs are clear. Pulmonary vasculature is normal. No acute osseous abnormality.                                          Labs     Recent Labs   Lab 01/01/25  0501   WBC 12.24   HGB 12.5   HCT 38.6          Recent Labs   Lab 01/01/25  0501   *   K 4.2      CO2 28   BUN 34*   CREATININE 0.8   *   CALCIUM 8.7   MG 2.2   PHOS 2.3*     No results for input(s): "PH", "PCO2", "PO2", "HCO3" in the last 24 hours.      Microbiology Results (last 7 days)       Procedure Component Value Units Date/Time    Respiratory Infection Panel (PCR), Nasopharyngeal [[id number]] Collected: 12/28/24 1943    Order Status: Completed Specimen: Nasopharyngeal Swab Updated: 12/28/24 2116     Respiratory Infection Panel Source NP swab     Adenovirus Not Detected     Coronavirus 229E, Common Cold Virus Not Detected     Coronavirus " HKU1, Common Cold Virus Not Detected     Coronavirus NL63, Common Cold Virus Not Detected     Coronavirus OC43, Common Cold Virus Not Detected     Comment: Coronavirus strains 229E, HKU1, NL63, and OC43 can cause the common   cold   and are not associated with the respiratory disease outbreak caused   by  the COVID-19 (SARS-CoV-2 novel Coronavirus) strain.           SARS-CoV2 (COVID-19) Qualitative PCR Not Detected     Human Metapneumovirus Not Detected     Human Rhinovirus/Enterovirus Not Detected     Influenza A (subtypes H1, H1-2009,H3) Not Detected     Influenza B Not Detected     Parainfluenza Virus 1 Not Detected     Parainfluenza Virus 2 Not Detected     Parainfluenza Virus 3 Not Detected     Parainfluenza Virus 4 Not Detected     Respiratory Syncytial Virus Not Detected     Bordetella Parapertussis (PS9422) Not Detected     Bordetella pertussis (ptxP) Not Detected     Chlamydia pneumoniae Not Detected     Mycoplasma pneumoniae Not Detected     Comment: Respiratory Infection Panel testing performed by Multiplex PCR.       Narrative:      Respiratory Infection Panel source->NP Swab    Respiratory Infection Panel (PCR), Nasopharyngeal [[id number]]     Order Status: No result Specimen: Nasopharyngeal Swab             Impression:  Active Hospital Problems    Diagnosis  POA    *Asthma exacerbation [J45.901]  Yes    Ground glass opacity present on imaging of lung [R91.8]  Yes    Pleuritic chest pain [R07.81]  No    Swelling of lower extremity [M79.89]  Yes    Morbid obesity [E66.01]  Yes    Essential hypertension [I10]  Yes    Acute hypoxic respiratory failure [J96.01]  Yes      Resolved Hospital Problems   No resolved problems to display.               Plan:       - BiPAP as needed, O2 as able  - continue steroids - decreased dose further this AM  - continue nebulized bronchodilators- q4h with q2h prn  - must have asthma control inhaler started on discharge  - weight loss is essential to her care  - should be  screened for FAVIAN when stable and an outpt  - increase activity as able  - hopefully ready for DC in the next day or so      Favio Guzman MD  [facility] Pulmonary/Critical Care  01/01/2025

## 2025-01-02 ENCOUNTER — PATIENT MESSAGE (OUTPATIENT)
Dept: CASE MANAGEMENT | Facility: HOSPITAL | Age: 40
End: 2025-01-02

## 2025-01-02 ENCOUNTER — CLINICAL SUPPORT (OUTPATIENT)
Dept: SMOKING CESSATION | Facility: CLINIC | Age: 40
End: 2025-01-02
Payer: COMMERCIAL

## 2025-01-02 VITALS
OXYGEN SATURATION: 97 % | BODY MASS INDEX: 50.02 KG/M2 | RESPIRATION RATE: 16 BRPM | DIASTOLIC BLOOD PRESSURE: 87 MMHG | SYSTOLIC BLOOD PRESSURE: 161 MMHG | WEIGHT: 293 LBS | HEART RATE: 81 BPM | TEMPERATURE: 98 F | HEIGHT: 64 IN

## 2025-01-02 DIAGNOSIS — F17.200 NICOTINE DEPENDENCE: Primary | ICD-10-CM

## 2025-01-02 LAB
ADENOVIRUS: NOT DETECTED
ANION GAP SERPL CALC-SCNC: 5 MMOL/L (ref 8–16)
BASOPHILS # BLD AUTO: 0.05 K/UL (ref 0–0.2)
BASOPHILS NFR BLD: 0.3 % (ref 0–1.9)
BORDETELLA PARAPERTUSSIS (IS1001): NOT DETECTED
BORDETELLA PERTUSSIS (PTXP): NOT DETECTED
BUN SERPL-MCNC: 38 MG/DL (ref 6–20)
CALCIUM SERPL-MCNC: 8.5 MG/DL (ref 8.7–10.5)
CHLAMYDIA PNEUMONIAE: NOT DETECTED
CHLORIDE SERPL-SCNC: 100 MMOL/L (ref 95–110)
CO2 SERPL-SCNC: 31 MMOL/L (ref 23–29)
CORONAVIRUS 229E, COMMON COLD VIRUS: NOT DETECTED
CORONAVIRUS HKU1, COMMON COLD VIRUS: NOT DETECTED
CORONAVIRUS NL63, COMMON COLD VIRUS: NOT DETECTED
CORONAVIRUS OC43, COMMON COLD VIRUS: NOT DETECTED
CREAT SERPL-MCNC: 0.9 MG/DL (ref 0.5–1.4)
DIFFERENTIAL METHOD BLD: ABNORMAL
EOSINOPHIL # BLD AUTO: 0 K/UL (ref 0–0.5)
EOSINOPHIL NFR BLD: 0 % (ref 0–8)
ERYTHROCYTE [DISTWIDTH] IN BLOOD BY AUTOMATED COUNT: 14.6 % (ref 11.5–14.5)
EST. GFR  (NO RACE VARIABLE): >60 ML/MIN/1.73 M^2
FLUBV RNA NPH QL NAA+NON-PROBE: NOT DETECTED
GLUCOSE SERPL-MCNC: 125 MG/DL (ref 70–110)
GROUP A STREP, MOLECULAR: NEGATIVE
HCT VFR BLD AUTO: 38 % (ref 37–48.5)
HGB BLD-MCNC: 12.3 G/DL (ref 12–16)
HPIV1 RNA NPH QL NAA+NON-PROBE: NOT DETECTED
HPIV2 RNA NPH QL NAA+NON-PROBE: NOT DETECTED
HPIV3 RNA NPH QL NAA+NON-PROBE: NOT DETECTED
HPIV4 RNA NPH QL NAA+NON-PROBE: NOT DETECTED
HUMAN METAPNEUMOVIRUS: NOT DETECTED
IMM GRANULOCYTES # BLD AUTO: 0.61 K/UL (ref 0–0.04)
IMM GRANULOCYTES NFR BLD AUTO: 4.2 % (ref 0–0.5)
INFLUENZA A (SUBTYPES H1,H1-2009,H3): NOT DETECTED
LYMPHOCYTES # BLD AUTO: 3.5 K/UL (ref 1–4.8)
LYMPHOCYTES NFR BLD: 24.1 % (ref 18–48)
MAGNESIUM SERPL-MCNC: 2.4 MG/DL (ref 1.6–2.6)
MCH RBC QN AUTO: 29.8 PG (ref 27–31)
MCHC RBC AUTO-ENTMCNC: 32.4 G/DL (ref 32–36)
MCV RBC AUTO: 92 FL (ref 82–98)
MONOCYTES # BLD AUTO: 1.4 K/UL (ref 0.3–1)
MONOCYTES NFR BLD: 9.3 % (ref 4–15)
MYCOPLASMA PNEUMONIAE: NOT DETECTED
NEUTROPHILS # BLD AUTO: 9 K/UL (ref 1.8–7.7)
NEUTROPHILS NFR BLD: 62.1 % (ref 38–73)
NRBC BLD-RTO: 0 /100 WBC
PHOSPHATE SERPL-MCNC: 3.3 MG/DL (ref 2.7–4.5)
PLATELET # BLD AUTO: 323 K/UL (ref 150–450)
PMV BLD AUTO: 9.7 FL (ref 9.2–12.9)
POTASSIUM SERPL-SCNC: 4.3 MMOL/L (ref 3.5–5.1)
RBC # BLD AUTO: 4.13 M/UL (ref 4–5.4)
RESPIRATORY INFECTION PANEL SOURCE: NORMAL
RSV RNA NPH QL NAA+NON-PROBE: NOT DETECTED
RV+EV RNA NPH QL NAA+NON-PROBE: NOT DETECTED
SARS-COV-2 RNA RESP QL NAA+PROBE: NOT DETECTED
SODIUM SERPL-SCNC: 136 MMOL/L (ref 136–145)
WBC # BLD AUTO: 14.55 K/UL (ref 3.9–12.7)

## 2025-01-02 PROCEDURE — 25000003 PHARM REV CODE 250

## 2025-01-02 PROCEDURE — 25000242 PHARM REV CODE 250 ALT 637 W/ HCPCS

## 2025-01-02 PROCEDURE — 94640 AIRWAY INHALATION TREATMENT: CPT

## 2025-01-02 PROCEDURE — 99900035 HC TECH TIME PER 15 MIN (STAT)

## 2025-01-02 PROCEDURE — 36415 COLL VENOUS BLD VENIPUNCTURE: CPT

## 2025-01-02 PROCEDURE — 84100 ASSAY OF PHOSPHORUS: CPT

## 2025-01-02 PROCEDURE — 94660 CPAP INITIATION&MGMT: CPT

## 2025-01-02 PROCEDURE — 63600175 PHARM REV CODE 636 W HCPCS

## 2025-01-02 PROCEDURE — 94618 PULMONARY STRESS TESTING: CPT

## 2025-01-02 PROCEDURE — 80048 BASIC METABOLIC PNL TOTAL CA: CPT

## 2025-01-02 PROCEDURE — 27000221 HC OXYGEN, UP TO 24 HOURS

## 2025-01-02 PROCEDURE — 83735 ASSAY OF MAGNESIUM: CPT

## 2025-01-02 PROCEDURE — 27100171 HC OXYGEN HIGH FLOW UP TO 24 HOURS

## 2025-01-02 PROCEDURE — 25500020 PHARM REV CODE 255

## 2025-01-02 PROCEDURE — 94761 N-INVAS EAR/PLS OXIMETRY MLT: CPT

## 2025-01-02 PROCEDURE — 99233 SBSQ HOSP IP/OBS HIGH 50: CPT | Mod: ,,, | Performed by: INTERNAL MEDICINE

## 2025-01-02 PROCEDURE — 87651 STREP A DNA AMP PROBE: CPT

## 2025-01-02 PROCEDURE — 99900031 HC PATIENT EDUCATION (STAT)

## 2025-01-02 PROCEDURE — 85025 COMPLETE CBC W/AUTO DIFF WBC: CPT

## 2025-01-02 PROCEDURE — 87798 DETECT AGENT NOS DNA AMP: CPT | Mod: 59

## 2025-01-02 PROCEDURE — 99406 BEHAV CHNG SMOKING 3-10 MIN: CPT | Performed by: CLINIC/CENTER

## 2025-01-02 RX ORDER — HYDROCODONE BITARTRATE AND ACETAMINOPHEN 5; 325 MG/1; MG/1
1 TABLET ORAL ONCE
Status: COMPLETED | OUTPATIENT
Start: 2025-01-02 | End: 2025-01-02

## 2025-01-02 RX ORDER — AMOXICILLIN AND CLAVULANATE POTASSIUM 875; 125 MG/1; MG/1
1 TABLET, FILM COATED ORAL EVERY 12 HOURS
Qty: 10 TABLET | Refills: 0 | Status: SHIPPED | OUTPATIENT
Start: 2025-01-02 | End: 2025-01-07

## 2025-01-02 RX ORDER — PREDNISONE 20 MG/1
TABLET ORAL
Qty: 25 TABLET | Refills: 0 | Status: SHIPPED | OUTPATIENT
Start: 2025-01-02 | End: 2025-01-22

## 2025-01-02 RX ORDER — FUROSEMIDE 20 MG/1
40 TABLET ORAL DAILY
Qty: 60 TABLET | Refills: 11 | Status: SHIPPED | OUTPATIENT
Start: 2025-01-02 | End: 2026-01-02

## 2025-01-02 RX ORDER — HYDROCODONE BITARTRATE AND ACETAMINOPHEN 5; 325 MG/1; MG/1
1 TABLET ORAL ONCE
Status: DISCONTINUED | OUTPATIENT
Start: 2025-01-02 | End: 2025-01-02

## 2025-01-02 RX ORDER — FLUTICASONE FUROATE AND VILANTEROL 100; 25 UG/1; UG/1
1 POWDER RESPIRATORY (INHALATION) DAILY
Qty: 2 EACH | Refills: 0 | Status: SHIPPED | OUTPATIENT
Start: 2025-01-02

## 2025-01-02 RX ORDER — GABAPENTIN 300 MG/1
300 CAPSULE ORAL 2 TIMES DAILY
Qty: 20 CAPSULE | Refills: 0 | Status: SHIPPED | OUTPATIENT
Start: 2025-01-02

## 2025-01-02 RX ADMIN — PANTOPRAZOLE SODIUM 40 MG: 40 TABLET, DELAYED RELEASE ORAL at 06:01

## 2025-01-02 RX ADMIN — DIPHENHYDRAMINE HYDROCHLORIDE 5 ML: 12.5 SOLUTION ORAL at 12:01

## 2025-01-02 RX ADMIN — METHYLPREDNISOLONE SODIUM SUCCINATE 60 MG: 125 INJECTION, POWDER, FOR SOLUTION INTRAMUSCULAR; INTRAVENOUS at 08:01

## 2025-01-02 RX ADMIN — MORPHINE SULFATE 1 MG: 2 INJECTION, SOLUTION INTRAMUSCULAR; INTRAVENOUS at 09:01

## 2025-01-02 RX ADMIN — IPRATROPIUM BROMIDE AND ALBUTEROL SULFATE 3 ML: .5; 3 SOLUTION RESPIRATORY (INHALATION) at 03:01

## 2025-01-02 RX ADMIN — IOHEXOL 100 ML: 350 INJECTION, SOLUTION INTRAVENOUS at 02:01

## 2025-01-02 RX ADMIN — ENOXAPARIN SODIUM 60 MG: 60 INJECTION SUBCUTANEOUS at 08:01

## 2025-01-02 RX ADMIN — IPRATROPIUM BROMIDE AND ALBUTEROL SULFATE 3 ML: .5; 3 SOLUTION RESPIRATORY (INHALATION) at 11:01

## 2025-01-02 RX ADMIN — IPRATROPIUM BROMIDE AND ALBUTEROL SULFATE 3 ML: .5; 3 SOLUTION RESPIRATORY (INHALATION) at 06:01

## 2025-01-02 RX ADMIN — HYDROCODONE BITARTRATE AND ACETAMINOPHEN 1 TABLET: 5; 325 TABLET ORAL at 03:01

## 2025-01-02 RX ADMIN — FUROSEMIDE 40 MG: 10 INJECTION, SOLUTION INTRAMUSCULAR; INTRAVENOUS at 08:01

## 2025-01-02 NOTE — NURSING
Pt demanding to leave AMA. Educated pt that we were waiting on home O2 and PCR/Step test results and then she would be D/C. Pt still adamant on leaving AMA. Attending MD notified.    1334: AMA form signed by pt and ML removed at pt request for AMA and pt left unit. MD notified this RN that she spoke with pt and pt agreed to stay and get CT scan. Pt returned to room.  Consult placed for US guided IV.

## 2025-01-02 NOTE — DISCHARGE SUMMARY
[facility] Medicine  Discharge Summary      Patient Name: Ifrah Esquivel  MRN: [id number]  GEO: [id number]  Patient Class: IP- Inpatient  Admission Date: 12/28/2024  Hospital Length of Stay: 4 days  Discharge Date and Time: 1/2/2025  4:36 PM  Attending Physician: Laura att. providers found   Discharging Provider: Loly Kim MD  Primary Care Provider: Laura, Primary Doctor    Primary Care Team: Networked reference to record PCT     HPI:   Ms. Esquivel is a 39 yr old female with a hx of obesity, HTN, asthma, tobacco use, bipolar disorder, anxiety, PTSD who presents to ED with a chief complaint of shortness of breath.  Patient states that initially her symptoms began as a dry cough 3 days ago and the next day she began having shortness of breath.  She does endorse being out in the cold weather and rain recently prior to symptom onset.  She states over the last 3 days her symptoms have progressively worsened.  She states that any kind of exertion exacerbates her symptoms.  She also endorses associated wheezing and lightheadedness.  She has tried her home inhalers and nebulizer treatments with minimal relief.  She smokes about half a pack of cigarettes a day and denies alcohol and recreational drug use.  She denies fever, chills, chest pain, abdominal pain, nausea, vomiting, diarrhea, dysuria, hematuria, syncope.    Upon arrival to ED, patient afebrile, HR of 98, RR of 24, BP of 127/94, satting 97% on RA.  Initial labs in the ED unremarkable.  CXR with no acute cardiopulmonary abnormality.  Patient was given budesonide 0.5 mg nebulization, ipratropium 0.5 mg nebulization, levalbuterol 1.25 mg nebulization x2, lorazepam 0.5 mg IV, Mag sulfate 2 g IV, methylprednisolone 125 mg IV, 250 mL NS bolus while in the ED. even after nebulization treatments patient was still noted to have increased work of breathing and decreased air movement and was placed on continuous BiPAP.  Patient stayed on BiPAP for  about 3 hours according to documented vitals in the ED and seemed to have improved with better air movement throughout lung fields.  Discussed case with ED provider and patient will be placed under observation for further management.    * No surgery found *      Hospital Course:   Ms. Esquivel is a 33-year-old female with history of obesity, asthma, tobacco use, anxiety presented with chief complaint of shortness of breath.  She was admitted for severe asthma exacerbation requiring IV magnesium , and nebulizations multiple times.  She required continue BiPAP, was continued on IV steroids and pulmonology was consulted.  Eventually her respiratory status improved, required BiPAP intermittently.  She also had edema of her hands and legs for which 2D echocardiogram obtained that showed preserved EF and normal diastolic function.  Bilateral lower extremity ultrasound was negative for DVT.  D-dimer was elevated, given her presentation with hypoxia and shortness of breaths, CTA PE protocol was ordered that was negative for pulmonary embolism.  With IV steroids, she continued to have anasarca for which IV Lasix was started and she responded well.  Steroids were weaned as tolerated, her respiratory status significantly improved.  Pulmonology cleared her for discharge on a steroid taper, Breo Ellipta daily, with follow up with him on phone call within 1 week, outpatient/home sleep studies after this exacerbation resolves.  On the day of discharge, her shortness of breath significantly improved, on home oxygen eval she was hypoxic and did qualify for home oxygen.  Case management was consulted who arranged oxygen for home.  She complained of sore throat, on exam she had pus like material in the pharynx, strep culture negative, nasopharyngeal panel negative, sore throat improved with topical lidocaine.  Given the concern of pus and concern of upper airway closure as per Pulmonary, CT was obtained that was negative for  peritonsillar abscess/retropharyngeal abscess.  It showed findings concerning for sinusitis, likely that is causing postnasal drip causing her sore throat/slight pus or mucus, she was prescribed to complete Augmentin for a total of 5 days.  She will be discharged on a prednisone taper, asthma controller therapy, Lasix, Augmentin, topical lidocaine spray for sore throat.  Case management set up follow up with however discharge clinic in 7 days.  She will also call pulmonology office in the next week.  All instructions explained at bedside and she verbalized understanding.     Goals of Care Treatment Preferences:  Code Status: Full Code      SDOH Screening:  The patient was screened for utility difficulties, food insecurity, transport difficulties, housing insecurity, and interpersonal safety and there were no concerns identified this admission.     Consults:   Consults (From admission, onward)          Status Ordering Provider     Inpatient consult to Midline team  Once        Provider:  (Not yet assigned)    Completed CLARICE PASTOR     Inpatient consult to Midline team  Once        Provider:  (Not yet assigned)    Completed DELROY BLAKE     Inpatient consult to Pulmonology  Once        Provider:  Denise Garcia MD    Completed GORGE RAYMOND              Final Active Diagnoses:    Diagnosis Date Noted POA    PRINCIPAL PROBLEM:  Asthma exacerbation [J45.901] 12/28/2024 Yes    Ground glass opacity present on imaging of lung [R91.8] 01/01/2025 Yes    Pleuritic chest pain [R07.81] 12/29/2024 No    Swelling of lower extremity [M79.89] 12/29/2024 Yes    Morbid obesity [E66.01] 08/20/2020 Yes    Essential hypertension [I10] 08/20/2020 Yes    Acute hypoxic respiratory failure [J96.01] 08/20/2020 Yes      Problems Resolved During this Admission:       Discharged Condition: stable    Disposition: Home or Self Care    Follow Up:   Follow-up Information       [facility]  [facility].    Why: message sent for hospital follow up.  Contact information:  [facility] [address]  [phone]               Favio Guzman MD. Call.    Specialty: Pulmonary Disease  Why: message sent to Dr. Guzman's office- call them tomorrow.  Contact information:  [address]  [address]  [place] [address]  [phone]               No, Primary Doctor Follow up in 1 week(s).               Maehler, Jewel A, FNP. Go on 1/9/2025.    Specialty: Family Medicine  Why: Hospital follow up scheduled at 9:00 a.m. on 1/9.  Contact information:  [address]  [address]  [facility] [address]  [phone]                           Patient Instructions:      OXYGEN FOR HOME USE     Order Specific Question Answer Comments   Liter Flow 2    Duration With activity    Qualifying Test Performed at: Activity    Oxygen saturation at rest 93    Oxygen saturation with activity 87    Oxygen saturation with activity on oxygen 93    Portable mode: continuous    Route nasal cannula    Device: home concentrator with portable tanks    Length of need (in months): 3 mos    Patient condition with qualifying saturation Other - List qualifying diagnosis and code    Select a diagnosis & list the code in the comments Asthma exacerbation [530070]    Height: 5' 4" (1.626 m)    Weight: 188.7 kg (416 lb 1.6 oz)    Alternative treatment measures have been tried or considered and deemed clinically ineffective. Yes      Ambulatory referral/consult to Outpatient Case Management   Referral Priority: Routine Referral Type: Consultation   Referral Reason: Specialty Services Required   Number of Visits Requested: 1     Ambulatory referral/consult to [facility]   Standing Status: Future   Referral Priority: Routine Referral Type: Consultation   Referral Reason: Specialty Services Required   Number of Visits Requested: 1     Ambulatory referral/consult to Smoking Cessation Program   Standing Status: Future   Referral " Priority: Routine Referral Type: Consultation   Referral Reason: Specialty Services Required   Requested Specialty: CTTS   Number of Visits Requested: 1     Notify your health care provider if you experience any of the following:  temperature >100.4     Notify your health care provider if you experience any of the following:  persistent nausea and vomiting or diarrhea     Notify your health care provider if you experience any of the following:  severe uncontrolled pain     Notify your health care provider if you experience any of the following:  redness, tenderness, or signs of infection (pain, swelling, redness, odor or green/yellow discharge around incision site)     Notify your health care provider if you experience any of the following:  difficulty breathing or increased cough     Notify your health care provider if you experience any of the following:  severe persistent headache     Notify your health care provider if you experience any of the following:  worsening rash     Notify your health care provider if you experience any of the following:  persistent dizziness, light-headedness, or visual disturbances     Notify your health care provider if you experience any of the following:  increased confusion or weakness     Notify your health care provider if you experience any of the following:     Activity as tolerated       Significant Diagnostic Studies: N/A    Pending Diagnostic Studies:       Procedure Component Value Units Date/Time    EKG 12-lead [[id number]] Collected: 01/01/25 2059    Order Status: Sent Lab Status: In process Updated: 01/02/25 0507     QRS Duration 94 ms      [facility] QTC Calculation 449 ms     Narrative:      Test Reason : R07.9,    Vent. Rate :  98 BPM     Atrial Rate :  98 BPM     P-R Int : 124 ms          QRS Dur :  94 ms      QT Int : 352 ms       P-R-T Axes :  57  42  47 degrees    QTcB Int : 449 ms    Normal sinus rhythm  Normal ECG  When compared with ECG of 28-Dec-2024 13:16,  No  significant change was found    Referred By: AAAREFERRAL SELF           Confirmed By:            Medications:  Reconciled Home Medications:      Medication List        START taking these medications      (MAGIC MOUTHWASH) 1:1:1 BENADRYL 12.5MG/5ML LIQ, ALUMINUM & MAGNESIUM  Swish and spit 5 mLs every 4 (four) hours as needed. for mouth sores     amoxicillin-clavulanate 875-125mg 875-125 mg per tablet  Commonly known as: AUGMENTIN  Take 1 tablet by mouth every 12 (twelve) hours. for 5 days     fluticasone furoate-vilanteroL 100-25 mcg/dose diskus inhaler  Commonly known as: BREO ELLIPTA  Inhale 1 puff into the lungs once daily. Controller     furosemide 20 MG tablet  Commonly known as: LASIX  Take 2 tablets (40 mg total) by mouth once daily.     predniSONE 20 MG tablet  Commonly known as: DELTASONE  Take 2 tablets (40 mg total) by mouth once daily for 5 days, THEN 1.5 tablets (30 mg total) once daily for 5 days, THEN 1 tablet (20 mg total) once daily for 5 days, THEN 0.5 tablets (10 mg total) once daily for 5 days.  Start taking on: January 2, 2025            CHANGE how you take these medications      gabapentin 300 MG capsule  Commonly known as: NEURONTIN  Take 1 capsule (300 mg total) by mouth 2 (two) times daily.  What changed: how much to take            CONTINUE taking these medications      * albuterol 90 mcg/actuation inhaler  Commonly known as: VENTOLIN HFA  Inhale 2 puffs into the lungs every 6 (six) hours as needed for Wheezing. Rescue     * albuterol 2.5 mg /3 mL (0.083 %) nebulizer solution  Commonly known as: PROVENTIL  Take 3 mLs (2.5 mg total) by nebulization every 6 (six) hours as needed for Wheezing or Shortness of Breath. Rescue     benzonatate 100 MG capsule  Commonly known as: TESSALON  Take 1 capsule (100 mg total) by mouth 3 (three) times daily as needed for Cough.     benzphetamine 50 mg Tab  Take 1 tablet by mouth 3 (three) times daily.     cetirizine 10 MG tablet  Commonly known as:  ZYRTEC  Take 1 tablet (10 mg total) by mouth daily as needed for Allergies or Rhinitis.     CombiVENT RESPIMAT  mcg/actuation inhaler  Generic drug: ipratropium-albuteroL  Inhale 1 puff into the lungs every 4 (four) hours as needed for Shortness of Breath.     cyclobenzaprine 10 MG tablet  Commonly known as: FLEXERIL  Take 10 mg by mouth 3 (three) times daily as needed for Muscle spasms.     fluticasone propionate 50 mcg/actuation nasal spray  Commonly known as: FLONASE  1 spray (50 mcg total) by Each Nostril route 2 (two) times daily as needed for Rhinitis or Allergies.     ipratropium 0.02 % nebulizer solution  Commonly known as: ATROVENT  Take 500 mcg by nebulization 4 (four) times daily.     phentermine 37.5 mg tablet  Commonly known as: ADIPEX-P  Take 18.75 mg by mouth 2 (two) times daily.     QUEtiapine 300 MG Tab  Commonly known as: SEROQUEL  Take 300 mg by mouth every evening.     sumatriptan 25 MG Tab  Commonly known as: IMITREX  Take 25 mg by mouth as needed.           * This list has 2 medication(s) that are the same as other medications prescribed for you. Read the directions carefully, and ask your doctor or other care provider to review them with you.                STOP taking these medications      promethazine-dextromethorphan 6.25-15 mg/5 mL Syrp  Commonly known as: PROMETHAZINE-DM            ASK your doctor about these medications      lamoTRIgine 25 MG tablet  Commonly known as: LAMICTAL  Take 25 mg by mouth once daily.     mupirocin 2 % ointment  Commonly known as: BACTROBAN  Apply topically 2 (two) times daily.     nicotine 21 mg/24 hr  Commonly known as: NICODERM CQ  Place 1 patch onto the skin once daily.              Indwelling Lines/Drains at time of discharge:   Lines/Drains/Airways       None                   Time spent on the discharge of patient: 40 minutes         Loly Kim MD  Department of Hospital Medicine  [facility]

## 2025-01-02 NOTE — NURSING
Pt and significant other educated on dc paperwork, medication list, follow-up appt, and education. All questions answered. Pt left unit via w/c with O2 @2L via NC and left hospital via private vehicle with significant other.    with patient

## 2025-01-02 NOTE — DISCHARGE INSTRUCTIONS
Referral placed for [facility]-  this is where a Nurse Practitioner will come to your house to follow up with you from the hospital.     Discharge Instructions, [facility] Medicine    Thank you for choosing [facility] for your medical care. The primary doctor who is taking care of you at the time of your discharge is Loly Kim, *.     You were admitted to the hospital with Asthma exacerbation.     Please note your discharge instructions, including diet/activity restrictions, follow-up appointments, and medication changes.  If you have any questions about your medical issues, prescriptions, or any other questions, please feel free to contact the [facility] Medicine Dept at [phone] and we will help.    If you are previously with Home health, outpatient PT/OT or under a therapy program, you are cleared to return to those programs.    Please direct all long term medication refills and follow up to your primary care provider, No, Primary Doctor. Thank you again for letting us take care of your health care needs.    Please note the following discharge instructions per your discharging physician-  Please take the medications as prescribed     Please read the attached weight loss instructions     Please follow up with your primary care doctor as outpatient     Please call pulmonology Dr. Guzman office to update how you are doing within 5-7 days of discharge     Please get sleep studies as outpatient for CPAP    Please complete your steroids as prescribed    For any other worsening signs or symptoms, please call your doctor, go to the emergency department    For any other emergency, please call [phone]

## 2025-01-02 NOTE — CARE UPDATE
01/01/25 1938   Patient Assessment/Suction   Level of Consciousness (AVPU) alert   Respiratory Effort Normal   Expansion/Accessory Muscles/Retractions no use of accessory muscles   All Lung Fields Breath Sounds diminished   Rhythm/Pattern, Respiratory assisted mechanically;unlabored   Cough Frequency no cough   Skin Integrity   $ Wound Care Tech Time 15 min   Area Observed Bridge of nose   Skin Appearance without discoloration   PRE-TX-O2   Device (Oxygen Therapy) BIPAP   Oxygen Concentration (%) 21   SpO2 (!) 93 %   Pulse Oximetry Type Continuous   $ Pulse Oximetry - Multiple Charge Pulse Oximetry - Multiple   Pulse 91   Resp 20   Aerosol Therapy   $ Aerosol Therapy Charges Aerosol Treatment   Daily Review of Necessity (SVN) completed   Respiratory Treatment Status (SVN) given   Treatment Route (SVN) in-line   Patient Position semi-Quintanilla's   Post Treatment Assessment (SVN) breath sounds unchanged;vital signs unchanged   Signs of Intolerance (SVN) none   Ready to Wean/Extubation Screen   FIO2<=50 (chart decimal) 0.21   Preset CPAP/BiPAP Settings   Mode Of Delivery BiPAP   CPAP/BIPAP charged w/in last 24 h YES   $ Initial CPAP/BiPAP Setup? No   $ Is patient using? Yes   Size of Mask Small/Medium   Sized Appropriately? Yes   Equipment Type V60   Airway Device Type medium full face mask   Ipap 12   EPAP (cm H2O) 5   Pressure Support (cm H2O) 7   Set Rate (Breaths/Min) 16   ITime (sec) 1   Rise Time (sec) 3   Patient CPAP/BiPAP Settings   Timed Inspiration (Sec) 1   IPAP Rise Time (sec) 3   RR Total (Breaths/Min) 18   Tidal Volume (mL) 1255   VE Minute Ventilation (L/min) 22.4 L/min   Peak Inspiratory Pressure (cm H2O) 13   TiTOT (%) 28   Total Leak (L/Min) 0   Patient Trigger - ST Mode Only (%) 50   CPAP/BiPAP Alarms   High Pressure (cm H2O) 40   Low Pressure (cm H2O) 8   Minute Ventilation (L/Min) 5   High RR (breaths/min) 40   Low RR (breaths/min) 8   Apnea (Sec) 20   Education   $ Education  Bronchodilator;BiPAP;15 min   Respiratory Evaluation   $ Care Plan Tech Time 15 min

## 2025-01-02 NOTE — PROGRESS NOTES
01/02/2025      Admit Date: 12/28/2024  Ifrah E Alvin  Pulmonary/Critical Care   Progress Note    Chief Complaint   Patient presents with    Shortness of Breath     Patient short of breath x 2 days, hx of asthma and nebulizer not helping nor did breathing treatment at 10am - patient audibly wheezing in triage - sats are 97% but pt has increased wob         History of Present Illness:  Pt is a 40 yo female with obesity, HTN, asthma, tobacco use, bipolar disorder, anxiety, PTSD who presents to ED with a chief complaint of shortness of breath. Pulmonary is consulted for asthma exacerbation. Pt has been having cough about 2 days and worsening sob with wheezing, feeling weak and exhausted. She has been using albuterol inhaler and nebs at home but is not on a control inhaler. She has had lifelong asthma, denies intubation for asthma. Currently pt requiring BIPAP.    12/30/2024 - Feels about the same, wearing BiPAP, still feels tight and wheezing.  She reports that her primary MD has been taking care of her asthma and that she has not been o a controlling medication.  WBC has increased (? Leukemoid reaction due to steroids).  SHe reports that she has had asthma since childhood and has been allergy tested and treated as a child.  She has a dog as a pet. SHe says she has not been tested for FAVIAN.      12/31/2024 - Doing better, was able to be off of the BiPAP yesterday during the day.  No new complaints.  She is less anxious today (likely due to decreased steroids dose).  We discussed the need to get her on controlling medications at [place].    1/1/2025 - Stable overnight, sleeping on BiPAP when I saw her and in no distress.  I did not waken her.  No new issues reported.      1/2/2025 - Did well overnight and feels like she is ready to go home.  We discussed theneed to take her controlling medication and the need to stop smoking.  We also discussed the need to do a sleep apnea evaluation once she is stable from her asthma and  the role of weight management in her asthma.        PFSH:  Past Medical History:   Diagnosis Date    Anxiety     Asthma     Bipolar affective disorder, current episode manic with psychotic symptoms     Hypertension     Manic bipolar I disorder     Mental health disorder     PTSD (post-traumatic stress disorder)     Substance abuse      Past Surgical History:   Procedure Laterality Date    APPENDECTOMY      c sections      x 3    CHOLECYSTECTOMY      ESOPHAGOGASTRODUODENOSCOPY N/A 9/15/2022    Procedure: EGD (ESOPHAGOGASTRODUODENOSCOPY);  Surgeon: Law Sheriff MD;  Location: [facility];  Service: Endoscopy;  Laterality: N/A;    HYSTERECTOMY      TONSILLECTOMY      tonsils and adenoidectomy       Social History     Tobacco Use    Smoking status: Every Day     Current packs/day: 0.50     Types: Cigarettes    Smokeless tobacco: Never   Substance Use Topics    Alcohol use: Never    Drug use: Yes     Types: Marijuana     Comment: daily     Family History   Problem Relation Name Age of Onset    Ovarian cancer Mother      Cancer Other m great gm         breast    No Known Problems Father      Diabetes Paternal Grandmother       Review of patient's allergies indicates:   Allergen Reactions    Asa [aspirin]     Aspirin Other (See Comments)     avoids due to asthma    Ultram [tramadol]     Ultram [tramadol] Nausea And Vomiting    Zofran [ondansetron hcl (pf)]     Zofran [ondansetron hcl (pf)] Other (See Comments)     Gives migraines       Performance Status:Performance Status:The patient's activity level is no limits with regular activity.    Review of Systems   Constitutional:  Positive for malaise/fatigue. Negative for chills, diaphoresis, fever and weight loss.   HENT:  Negative for congestion.    Eyes:  Negative for pain.   Respiratory:  Positive for cough, shortness of breath and wheezing. Negative for hemoptysis, sputum production and stridor.    Cardiovascular:  Negative for chest pain, palpitations, orthopnea,  claudication, leg swelling and PND.   Gastrointestinal:  Negative for abdominal pain, constipation, diarrhea, heartburn, nausea and vomiting.   Genitourinary:  Negative for dysuria, frequency and urgency.   Musculoskeletal:  Positive for joint pain. Negative for falls and myalgias.   Neurological:  Negative for sensory change, focal weakness and weakness.   Psychiatric/Behavioral:  Negative for depression. The patient is nervous/anxious.          Physical Exam  Vitals and nursing note reviewed.   Constitutional:       General: She is not in acute distress.     Appearance: Normal appearance. She is obese. She is not ill-appearing, toxic-appearing or diaphoretic.      Comments: Wearing BiPAP  Anxious   HENT:      Head: Normocephalic and atraumatic.      Right Ear: External ear normal.      Left Ear: External ear normal.      Nose: Nose normal. No congestion or rhinorrhea.      Mouth/Throat:      Mouth: Mucous membranes are moist.      Pharynx: Oropharynx is clear. No oropharyngeal exudate or posterior oropharyngeal erythema.   Eyes:      General: No scleral icterus.        Right eye: No discharge.         Left eye: No discharge.      Extraocular Movements: Extraocular movements intact.      Conjunctiva/sclera: Conjunctivae normal.      Pupils: Pupils are equal, round, and reactive to light.   Neck:      Vascular: No carotid bruit.   Cardiovascular:      Rate and Rhythm: Normal rate and regular rhythm.      Pulses: Normal pulses.      Heart sounds: No murmur heard.     No friction rub. No gallop.   Pulmonary:      Effort: Pulmonary effort is normal. No respiratory distress.      Breath sounds: No stridor. No wheezing, rhonchi or rales.      Comments: BS much clearer this AM while sleeping  Chest:      Chest wall: No tenderness.   Abdominal:      General: Bowel sounds are normal. There is no distension.      Tenderness: There is no abdominal tenderness. There is no guarding.      Comments: obese   Musculoskeletal:        "  General: No swelling. Normal range of motion.      Cervical back: Normal range of motion and neck supple. No rigidity or tenderness.      Right lower leg: No edema.      Left lower leg: No edema.   Lymphadenopathy:      Cervical: No cervical adenopathy.   Skin:     General: Skin is warm and dry.      Capillary Refill: Capillary refill takes less than 2 seconds.      Coloration: Skin is not jaundiced.      Findings: No bruising.   Neurological:      General: No focal deficit present.      Mental Status: She is alert and oriented to person, place, and time. Mental status is at baseline.      Cranial Nerves: No cranial nerve deficit.      Sensory: No sensory deficit.      Motor: No weakness.   Psychiatric:         Mood and Affect: Mood normal.         Behavior: Behavior normal.         Thought Content: Thought content normal.         Judgment: Judgment normal.           Imaging Results              X-Ray Chest AP Portable (Final result)  Result time 12/28/24 13:20:37      Final result by Jillian Collazo MD (12/28/24 13:20:37)                   Impression:      No acute cardiopulmonary abnormality.      Electronically signed by: Jillian Collazo  Date:    12/28/2024  Time:    13:20               Narrative:    EXAMINATION:  XR CHEST AP PORTABLE    CLINICAL HISTORY:  Asthma;    FINDINGS:  Portable chest at 13:03 hours is compared to 12/15/2024 shows normal cardiomediastinal silhouette.    Lungs are clear. Pulmonary vasculature is normal. No acute osseous abnormality.                                          Labs     Recent Labs   Lab 01/02/25  0425   WBC 14.55*   HGB 12.3   HCT 38.0          Recent Labs   Lab 01/02/25  0425      K 4.3      CO2 31*   BUN 38*   CREATININE 0.9   *   CALCIUM 8.5*   MG 2.4   PHOS 3.3     No results for input(s): "PH", "PCO2", "PO2", "HCO3" in the last 24 hours.      Microbiology Results (last 7 days)       Procedure Component Value Units Date/Time    Respiratory " Infection Panel (PCR), Nasopharyngeal [[id number]] Collected: 12/28/24 1943    Order Status: Completed Specimen: Nasopharyngeal Swab Updated: 12/28/24 2116     Respiratory Infection Panel Source NP swab     Adenovirus Not Detected     Coronavirus 229E, Common Cold Virus Not Detected     Coronavirus HKU1, Common Cold Virus Not Detected     Coronavirus NL63, Common Cold Virus Not Detected     Coronavirus OC43, Common Cold Virus Not Detected     Comment: Coronavirus strains 229E, HKU1, NL63, and OC43 can cause the common   cold   and are not associated with the respiratory disease outbreak caused   by  the COVID-19 (SARS-CoV-2 novel Coronavirus) strain.           SARS-CoV2 (COVID-19) Qualitative PCR Not Detected     Human Metapneumovirus Not Detected     Human Rhinovirus/Enterovirus Not Detected     Influenza A (subtypes H1, H1-2009,H3) Not Detected     Influenza B Not Detected     Parainfluenza Virus 1 Not Detected     Parainfluenza Virus 2 Not Detected     Parainfluenza Virus 3 Not Detected     Parainfluenza Virus 4 Not Detected     Respiratory Syncytial Virus Not Detected     Bordetella Parapertussis (AB0375) Not Detected     Bordetella pertussis (ptxP) Not Detected     Chlamydia pneumoniae Not Detected     Mycoplasma pneumoniae Not Detected     Comment: Respiratory Infection Panel testing performed by Multiplex PCR.       Narrative:      Respiratory Infection Panel source->NP Swab    Respiratory Infection Panel (PCR), Nasopharyngeal [[id number]]     Order Status: No result Specimen: Nasopharyngeal Swab             Impression:  Active Hospital Problems    Diagnosis  POA    *Asthma exacerbation [J45.901]  Yes    Ground glass opacity present on imaging of lung [R91.8]  Yes    Pleuritic chest pain [R07.81]  No    Swelling of lower extremity [M79.89]  Yes    Morbid obesity [E66.01]  Yes    Essential hypertension [I10]  Yes    Acute hypoxic respiratory failure [J96.01]  Yes      Resolved Hospital Problems   No resolved  problems to display.               Plan:       OK to DC on    BREO 100 1 puff daily and prn ALBUTEROL  Prednisone taper 30 x 5, 20 x 5, 10 x 5  NO SMOKING  Call my office next week and let me know how she is doing and we will decide on follow up appointment  Work on weight loss  Will plan to do home sleep study when she is over this exacerbation      Favio Guzman MD  [facility] Pulmonary/Critical Care  01/02/2025

## 2025-01-02 NOTE — CARE UPDATE
01/02/25 1114   PRE-TX-O2   Pulse 84   Resp 19   Home Oxygen Qualification   $ Home O2 Qualification Pulmonary Stress Test/6 min walk;Tech time 15 minutes   Room Air SpO2 At Rest 93 %   Room Air SpO2 During Ambulation (!) 87 %   SpO2 During Ambulation on O2 94 %   Heart Rate on O2 102 bpm   Ambulation O2 LPM 2 LPM   SpO2 Post Ambulation 97 %   Post Ambulation Heart Rate 89 bpm   Post Ambulation O2 LPM 2 LPM   Home O2 Eval Comments pt qualifies for home O2

## 2025-01-02 NOTE — PLAN OF CARE
Home o2 orders sent to [facility].       01/02/25 1214   Post-Acute Status   Post-Acute Authorization E   E Status Referrals Sent

## 2025-01-02 NOTE — PROGRESS NOTES
01/02/25 1030   Tobacco Cessation Intervention   Do you use any type of tobacco product? Yes   Are you interested in quitting use of tobacco products? Ready to quit   Are you interested in Nicotine Replacement for symptom relief? No   $ Smoking Cessation Charges Smoking Cessation - Intermediate (CTTS)

## 2025-01-02 NOTE — PLAN OF CARE
Problem: Adult Inpatient Plan of Care  Goal: Plan of Care Review  Outcome: Progressing     Problem: Bariatric Environmental Safety  Goal: Safety Maintained with Care  Outcome: Progressing     Problem: Wound  Goal: Skin Health and Integrity  Outcome: Progressing     Problem: Asthma Exacerbation  Goal: Asthma Symptom Relief  Outcome: Progressing     Problem: Infection  Goal: Absence of Infection Signs and Symptoms  Outcome: Progressing     Problem: Chest Pain  Goal: Resolution of Chest Pain Symptoms  Outcome: Progressing

## 2025-01-02 NOTE — CARE UPDATE
01/02/25 0641   Patient Assessment/Suction   Level of Consciousness (AVPU) alert   Respiratory Effort Unlabored   Expansion/Accessory Muscles/Retractions no use of accessory muscles   All Lung Fields Breath Sounds diminished   Rhythm/Pattern, Respiratory assisted mechanically   Cough Frequency no cough   Skin Integrity   $ Wound Care Tech Time 15 min   Area Observed Bridge of nose   Skin Appearance without discoloration   PRE-TX-O2   Device (Oxygen Therapy) BIPAP   $ Is the patient on Low Flow Oxygen? Yes  (O2 on sb)   $ Is the patient on High Flow Oxygen? Yes   Oxygen Concentration (%) 30   SpO2 (!) 94 %   Pulse Oximetry Type Continuous   $ Pulse Oximetry - Multiple Charge Pulse Oximetry - Multiple   Pulse 82   Resp 17   Aerosol Therapy   $ Aerosol Therapy Charges Aerosol Treatment   Daily Review of Necessity (SVN) completed   Respiratory Treatment Status (SVN) given   Treatment Route (SVN) in-line   Patient Position semi-Quintanilla's   Post Treatment Assessment (SVN) breath sounds unchanged   Signs of Intolerance (SVN) none   Breath Sounds Post-Respiratory Treatment   Throughout All Fields Post-Treatment aeration increased   Post-treatment Heart Rate (beats/min) 80   Post-treatment Resp Rate (breaths/min) 16   Ready to Wean/Extubation Screen   FIO2<=50 (chart decimal) 0.3   Preset CPAP/BiPAP Settings   Mode Of Delivery BiPAP   $ CPAP/BiPAP Daily Charge 1   CPAP/BIPAP charged w/in last 24 h YES   $ Is patient using? Yes   Size of Mask Small/Medium   Sized Appropriately? Yes   Equipment Type V60   Ipap 12   EPAP (cm H2O) 5   Pressure Support (cm H2O) 7   Set Rate (Breaths/Min) 16   Patient CPAP/BiPAP Settings   RR Total (Breaths/Min) 17   Tidal Volume (mL) 899   VE Minute Ventilation (L/min) 14.5 L/min   Education   $ Education Bronchodilator;15 min   Respiratory Evaluation   $ Care Plan Tech Time 15 min

## 2025-01-02 NOTE — PLAN OF CARE
Pt clear for DC from case management standpoint. Discharging to home with significant other at bedside to transport. Mounika will be here in approx 1 hour to deliver o2 tank.          01/02/25 1313   Final Note   Assessment Type Final Discharge Note   Anticipated Discharge Disposition Home   Hospital Resources/Appts/Education Provided Appointments scheduled and added to AVS

## 2025-01-03 ENCOUNTER — PATIENT OUTREACH (OUTPATIENT)
Dept: ADMINISTRATIVE | Facility: OTHER | Age: 40
End: 2025-01-03
Payer: COMMERCIAL

## 2025-01-03 ENCOUNTER — TELEPHONE (OUTPATIENT)
Dept: HOME HEALTH SERVICES | Facility: CLINIC | Age: 40
End: 2025-01-03
Payer: COMMERCIAL

## 2025-01-03 NOTE — PROGRESS NOTES
Questionnaire with patient/caregiver via telephone today.       Patient denied any SDOH needs at this time.

## 2025-01-03 NOTE — PROGRESS NOTES
CHW - Outreach Attempt    Community Health Worker left a voicemail message for 1st attempt to contact patient regarding: sdoh  Community Health Worker to attempt to contact patient on: [phone]

## 2025-01-05 LAB
OHS QRS DURATION: 88 MS
OHS QRS DURATION: 94 MS
OHS QTC CALCULATION: 438 MS
OHS QTC CALCULATION: 449 MS

## 2025-01-06 ENCOUNTER — TELEPHONE (OUTPATIENT)
Dept: PULMONOLOGY | Facility: CLINIC | Age: 40
End: 2025-01-06
Payer: COMMERCIAL

## 2025-01-06 NOTE — TELEPHONE ENCOUNTER
----- Message from Med Assistant Zimmer sent at 1/2/2025  2:46 PM CST -----   Hospital Follow Up, Appointment Access  Yoon Newell RN P Schuette Matthew Staff  Good morning!    Patient anticipated to d/c either today or tomorrow- will need a hospital follow up.  Can you help us out:)

## 2025-01-08 ENCOUNTER — PATIENT MESSAGE (OUTPATIENT)
Facility: CLINIC | Age: 40
End: 2025-01-08
Payer: COMMERCIAL

## 2025-01-09 ENCOUNTER — OFFICE VISIT (OUTPATIENT)
Facility: CLINIC | Age: 40
End: 2025-01-09
Payer: COMMERCIAL

## 2025-01-09 VITALS
OXYGEN SATURATION: 99 % | TEMPERATURE: 98 F | HEART RATE: 87 BPM | HEIGHT: 64 IN | WEIGHT: 293 LBS | BODY MASS INDEX: 50.02 KG/M2

## 2025-01-09 DIAGNOSIS — J45.41 MODERATE PERSISTENT ASTHMA WITH EXACERBATION: Primary | ICD-10-CM

## 2025-01-09 DIAGNOSIS — M79.89 SWELLING OF LOWER EXTREMITY: ICD-10-CM

## 2025-01-09 DIAGNOSIS — R91.8 GROUND GLASS OPACITY PRESENT ON IMAGING OF LUNG: ICD-10-CM

## 2025-01-09 DIAGNOSIS — B37.0 ORAL CANDIDIASIS: ICD-10-CM

## 2025-01-09 PROCEDURE — 99999 PR PBB SHADOW E&M-EST. PATIENT-LVL V: CPT | Mod: PBBFAC,,,

## 2025-01-09 RX ORDER — ALBUTEROL SULFATE 0.83 MG/ML
2.5 SOLUTION RESPIRATORY (INHALATION) EVERY 6 HOURS PRN
Qty: 20 EACH | Refills: 0 | Status: SHIPPED | OUTPATIENT
Start: 2025-01-09

## 2025-01-09 RX ORDER — NYSTATIN 100000 [USP'U]/ML
4 SUSPENSION ORAL 4 TIMES DAILY
Qty: 160 ML | Refills: 0 | Status: SHIPPED | OUTPATIENT
Start: 2025-01-09 | End: 2025-01-19

## 2025-01-09 RX ORDER — IPRATROPIUM BROMIDE 0.5 MG/2.5ML
500 SOLUTION RESPIRATORY (INHALATION) 4 TIMES DAILY
Qty: 75 ML | Refills: 2 | Status: SHIPPED | OUTPATIENT
Start: 2025-01-09

## 2025-01-09 RX ORDER — ALBUTEROL SULFATE 90 UG/1
2 INHALANT RESPIRATORY (INHALATION) EVERY 6 HOURS PRN
Qty: 36 G | Refills: 0 | Status: SHIPPED | OUTPATIENT
Start: 2025-01-09 | End: 2025-02-08

## 2025-01-09 NOTE — PROGRESS NOTES
"Subjective:  Chief Complaint   Patient presents with    Hospital Follow Up       History of Present Illness    Transitional Care Note    Family and/or Caretaker present at visit?  No.  Diagnostic tests reviewed/disposition: No diagnosic tests pending after this hospitalization.  Disease/illness education: Oral Candidiasis: Nystatin oral suspension switch and swallow 4 times daily for 10 days  Home health/community services discussion/referrals: Patient does not have home health established from hospital visit.  They do not need home health.  If needed, we will set up home health for the patient.   Establishment or re-establishment of referral orders for community resources: No other necessary community resources.   Discussion with other health care providers: No discussion with other health care providers necessary.     This 39 y.o. presents today for complaint of hospital follow-up for acute asthma exacerbation.   She reports her shortness of breath has improved but she always had SOB on exertion at her baseline.  She reports she is still having significant swelling of bilateral lower extremities. She reports that she feels like she is "full of fluid." She reports she has been taking Lasix daily since hospital discharge and it is helping but still feels swollen. She reports that she has been on prednisone numerous times over the past few months.  She is currently on prednisone taper for recent asthma exacerbation. Uses O2 at night to sleep. She was also put on antibiotics Augmentin at hospital discharge for sinusitis, cough and sore throat. She reports she no longer has sore throat but her mouth and tongue feel "raw."  She reports that her tongue is very irritated and sore. She reports that the antibiotics have not helped with the tongue/mouth irritation.       Review of Systems   Constitutional:  Negative for chills and fever.   HENT:          Tongue/Oral mucosa irritation and soreness   Respiratory:  Negative for " wheezing.         SOB on exertion   Dry cough at baseline   Cardiovascular:  Positive for leg swelling. Negative for chest pain and palpitations.   Gastrointestinal:  Negative for nausea and vomiting.       (D/C Summary)  Admission Date: 12/28/2024  Hospital Length of Stay: 4 days  Discharge Date and Time: 1/2/2025  4:36 PM  Attending Physician: Laura att. providers found   Discharging Provider: Loly Kim MD  Primary Care Provider: Laura, Primary Doctor     Primary Care Team: Networked reference to record PCT      HPI:   Ms. Esquivel is a 39 yr old female with a hx of obesity, HTN, asthma, tobacco use, bipolar disorder, anxiety, PTSD who presents to ED with a chief complaint of shortness of breath.  Patient states that initially her symptoms began as a dry cough 3 days ago and the next day she began having shortness of breath.  She does endorse being out in the cold weather and rain recently prior to symptom onset.  She states over the last 3 days her symptoms have progressively worsened.  She states that any kind of exertion exacerbates her symptoms.  She also endorses associated wheezing and lightheadedness.  She has tried her home inhalers and nebulizer treatments with minimal relief.  She smokes about half a pack of cigarettes a day and denies alcohol and recreational drug use.  She denies fever, chills, chest pain, abdominal pain, nausea, vomiting, diarrhea, dysuria, hematuria, syncope.     Upon arrival to ED, patient afebrile, HR of 98, RR of 24, BP of 127/94, satting 97% on RA.  Initial labs in the ED unremarkable.  CXR with no acute cardiopulmonary abnormality.  Patient was given budesonide 0.5 mg nebulization, ipratropium 0.5 mg nebulization, levalbuterol 1.25 mg nebulization x2, lorazepam 0.5 mg IV, Mag sulfate 2 g IV, methylprednisolone 125 mg IV, 250 mL NS bolus while in the ED. even after nebulization treatments patient was still noted to have increased work of breathing and decreased air movement  and was placed on continuous BiPAP.  Patient stayed on BiPAP for about 3 hours according to documented vitals in the ED and seemed to have improved with better air movement throughout lung fields.  Discussed case with ED provider and patient will be placed under observation for further management.     * No surgery found *       Hospital Course:   Ms. Esquivel is a 33-year-old female with history of obesity, asthma, tobacco use, anxiety presented with chief complaint of shortness of breath.  She was admitted for severe asthma exacerbation requiring IV magnesium , and nebulizations multiple times.  She required continue BiPAP, was continued on IV steroids and pulmonology was consulted.  Eventually her respiratory status improved, required BiPAP intermittently.  She also had edema of her hands and legs for which 2D echocardiogram obtained that showed preserved EF and normal diastolic function.  Bilateral lower extremity ultrasound was negative for DVT.  D-dimer was elevated, given her presentation with hypoxia and shortness of breaths, CTA PE protocol was ordered that was negative for pulmonary embolism.  With IV steroids, she continued to have anasarca for which IV Lasix was started and she responded well.  Steroids were weaned as tolerated, her respiratory status significantly improved.  Pulmonology cleared her for discharge on a steroid taper, Breo Ellipta daily, with follow up with him on phone call within 1 week, outpatient/home sleep studies after this exacerbation resolves.  On the day of discharge, her shortness of breath significantly improved, on home oxygen eval she was hypoxic and did qualify for home oxygen.  Case management was consulted who arranged oxygen for home.  She complained of sore throat, on exam she had pus like material in the pharynx, strep culture negative, nasopharyngeal panel negative, sore throat improved with topical lidocaine.  Given the concern of pus and concern of upper airway closure  "as per Pulmonary, CT was obtained that was negative for peritonsillar abscess/retropharyngeal abscess.  It showed findings concerning for sinusitis, likely that is causing postnasal drip causing her sore throat/slight pus or mucus, she was prescribed to complete Augmentin for a total of 5 days.  She will be discharged on a prednisone taper, asthma controller therapy, Lasix, Augmentin, topical lidocaine spray for sore throat.  Case management set up follow up with however discharge clinic in 7 days.  She will also call pulmonology office in the next week.  All instructions explained at bedside and she verbalized understanding.        Objective:    BP (!) (P) 151/95 (BP Location: Right forearm, Patient Position: Sitting)   Pulse 87   Temp 98.4 °F (36.9 °C) (Oral)   Ht 5' 4" (1.626 m)   Wt (!) 182.5 kg (402 lb 4.8 oz)   SpO2 99%   BMI 69.05 kg/m²  Body mass index is 69.05 kg/m².    BP Readings from Last 3 Encounters:   01/09/25 (!) (P) 151/95   01/02/25 (!) 161/87   12/15/24 (!) 142/92       Wt Readings from Last 3 Encounters:   01/09/25 (!) 182.5 kg (402 lb 4.8 oz)   01/01/25 (!) 188.7 kg (416 lb 1.6 oz)   12/15/24 136.1 kg (300 lb)       Physical Exam  Constitutional:       General: She is not in acute distress.     Appearance: She is obese.   HENT:      Head: Normocephalic and atraumatic.      Mouth/Throat:      Comments: Mild white patches present on tongue.  Erythema of oral mucosa  Cardiovascular:      Rate and Rhythm: Normal rate and regular rhythm.      Pulses: Normal pulses.   Pulmonary:      Effort: Pulmonary effort is normal. No respiratory distress.      Breath sounds: Normal breath sounds. No wheezing.   Abdominal:      General: Bowel sounds are normal. There is no distension.      Palpations: Abdomen is soft.      Tenderness: There is no abdominal tenderness. There is no guarding.   Musculoskeletal:      Right lower leg: Edema present.      Left lower leg: Edema present.      Comments: BLE edema 1+ "   Skin:     General: Skin is warm and dry.   Neurological:      Mental Status: She is alert and oriented to person, place, and time.   Psychiatric:         Mood and Affect: Mood normal.         Behavior: Behavior normal.         Thought Content: Thought content normal.         Judgment: Judgment normal.     Assessment/Plan:   1. Moderate persistent asthma with exacerbation  Assessment & Plan:  Improved per patient.  Albuterol nebulizer/inhaler rx refilled.  Continue steroid taper.  Follow up with Pulmonology- Dr Guzman- contact information given.  Discussed ER precautions.      Orders:  -     albuterol (VENTOLIN HFA) 90 mcg/actuation inhaler; Inhale 2 puffs into the lungs every 6 (six) hours as needed for Wheezing. Rescue  Dispense: 36 g; Refill: 0  -     albuterol (PROVENTIL) 2.5 mg /3 mL (0.083 %) nebulizer solution; Take 3 mLs (2.5 mg total) by nebulization every 6 (six) hours as needed for Wheezing or Shortness of Breath. Rescue  Dispense: 20 each; Refill: 0  -     ipratropium (ATROVENT) 0.02 % nebulizer solution; Take 2.5 mLs (500 mcg total) by nebulization 4 (four) times daily.  Dispense: 75 mL; Refill: 2    2. Ground glass opacity present on imaging of lung  Assessment & Plan:  CT imaging 12/29/24: 2 cm ground-glass opacity in the posterior left upper lobe.  Follow-up with Pulmonology for repeat CT in 6 months as recommended by radiologist.       3. Oral candidiasis  Assessment & Plan:  Likely 2/2 steroid use  Nystatin oral suspension switch and swallow 4 times daily for 10 days.  Follow-up if symptoms worsen or fail to improve.  -     nystatin (MYCOSTATIN) 100,000 unit/mL suspension; Take 4 mLs (400,000 Units total) by mouth 4 (four) times daily. for 10 days  Dispense: 160 mL; Refill: 0    4. Lower extremity swelling:  Assessment & Plan:        Recent Echo with normal systolic and diastolic function         Consider related to steroid use.  Continue Lasix daily.  Monitor for signs of fluid  retention:  Increased leg swelling. Start daily weights- If weight gain of 2-3 lbs overnight or 5 lbs in 3 days- this is fluid, take an extra lasix pill x1.  Low salt/Low sodium diet.  Follow up with PCP.           Orders Placed This Encounter    albuterol (VENTOLIN HFA) 90 mcg/actuation inhaler    albuterol (PROVENTIL) 2.5 mg /3 mL (0.083 %) nebulizer solution    ipratropium (ATROVENT) 0.02 % nebulizer solution    nystatin (MYCOSTATIN) 100,000 unit/mL suspension         Recommenced establishing care with PCP, contact information given.    Follow up if symptoms worsen or fail to improve.

## 2025-01-09 NOTE — PATIENT INSTRUCTIONS
[facility].        Contact information:  [address] [place] [address]  [phone]                    Favio Guzman MD. Call.    Specialty: Pulmonary Disease  Contact information:  [id number]  [place] [address]  [phone]

## 2025-01-10 NOTE — ASSESSMENT & PLAN NOTE
Improved per patient.  Albuterol nebulizer/inhaler rx refilled.  Continue steroid taper.  Follow up with Pulmonology- Dr Guzman- contact information given.  Discussed ER precautions.

## 2025-01-10 NOTE — ASSESSMENT & PLAN NOTE
CT imaging 12/29/24: 2 cm ground-glass opacity in the posterior left upper lobe.  Follow-up with Pulmonology for repeat CT in 6 months as recommended by radiologist.

## 2025-01-27 ENCOUNTER — TELEPHONE (OUTPATIENT)
Dept: SMOKING CESSATION | Facility: CLINIC | Age: 40
End: 2025-01-27
Payer: COMMERCIAL

## 2025-01-27 NOTE — TELEPHONE ENCOUNTER
Telephoned patient for follow up.  Patient was unavailable at this time.  No voicemail option available.

## 2025-02-02 ENCOUNTER — HOSPITAL ENCOUNTER (EMERGENCY)
Facility: HOSPITAL | Age: 40
Discharge: HOME | End: 2025-02-02
Attending: STUDENT IN AN ORGANIZED HEALTH CARE EDUCATION/TRAINING PROGRAM
Payer: COMMERCIAL

## 2025-02-02 VITALS
OXYGEN SATURATION: 100 % | RESPIRATION RATE: 18 BRPM | TEMPERATURE: 98 F | SYSTOLIC BLOOD PRESSURE: 131 MMHG | HEART RATE: 88 BPM | DIASTOLIC BLOOD PRESSURE: 81 MMHG | BODY MASS INDEX: 50.02 KG/M2 | HEIGHT: 64 IN | WEIGHT: 293 LBS

## 2025-02-02 DIAGNOSIS — L73.2 HIDRADENITIS: ICD-10-CM

## 2025-02-02 DIAGNOSIS — M79.89 BILATERAL SWELLING OF FEET: Primary | ICD-10-CM

## 2025-02-02 LAB
ALBUMIN SERPL BCP-MCNC: 3.7 G/DL (ref 3.5–5.2)
ALP SERPL-CCNC: 94 U/L (ref 40–150)
ALT SERPL W/O P-5'-P-CCNC: 21 U/L (ref 10–44)
ANION GAP SERPL CALC-SCNC: 8 MMOL/L (ref 8–16)
AST SERPL-CCNC: 15 U/L (ref 10–40)
BASOPHILS # BLD AUTO: 0.05 K/UL (ref 0–0.2)
BASOPHILS NFR BLD: 0.5 % (ref 0–1.9)
BILIRUB SERPL-MCNC: 0.4 MG/DL (ref 0.1–1)
BNP SERPL-MCNC: <10 PG/ML (ref 0–99)
BUN SERPL-MCNC: 12 MG/DL (ref 6–20)
CALCIUM SERPL-MCNC: 9.4 MG/DL (ref 8.7–10.5)
CHLORIDE SERPL-SCNC: 108 MMOL/L (ref 95–110)
CO2 SERPL-SCNC: 24 MMOL/L (ref 23–29)
CREAT SERPL-MCNC: 0.8 MG/DL (ref 0.5–1.4)
DIFFERENTIAL METHOD BLD: NORMAL
EOSINOPHIL # BLD AUTO: 0.1 K/UL (ref 0–0.5)
EOSINOPHIL NFR BLD: 0.9 % (ref 0–8)
ERYTHROCYTE [DISTWIDTH] IN BLOOD BY AUTOMATED COUNT: 13.7 % (ref 11.5–14.5)
EST. GFR  (NO RACE VARIABLE): >60 ML/MIN/1.73 M^2
GLUCOSE SERPL-MCNC: 98 MG/DL (ref 70–110)
HCT VFR BLD AUTO: 42.6 % (ref 37–48.5)
HGB BLD-MCNC: 13.8 G/DL (ref 12–16)
IMM GRANULOCYTES # BLD AUTO: 0.04 K/UL (ref 0–0.04)
IMM GRANULOCYTES NFR BLD AUTO: 0.4 % (ref 0–0.5)
LYMPHOCYTES # BLD AUTO: 3.7 K/UL (ref 1–4.8)
LYMPHOCYTES NFR BLD: 34.7 % (ref 18–48)
MAGNESIUM SERPL-MCNC: 1.8 MG/DL (ref 1.6–2.6)
MCH RBC QN AUTO: 29.6 PG (ref 27–31)
MCHC RBC AUTO-ENTMCNC: 32.4 G/DL (ref 32–36)
MCV RBC AUTO: 91 FL (ref 82–98)
MONOCYTES # BLD AUTO: 0.8 K/UL (ref 0.3–1)
MONOCYTES NFR BLD: 7.4 % (ref 4–15)
NEUTROPHILS # BLD AUTO: 5.9 K/UL (ref 1.8–7.7)
NEUTROPHILS NFR BLD: 56.1 % (ref 38–73)
NRBC BLD-RTO: 0 /100 WBC
PLATELET # BLD AUTO: 393 K/UL (ref 150–450)
PMV BLD AUTO: 9.2 FL (ref 9.2–12.9)
POTASSIUM SERPL-SCNC: 3.9 MMOL/L (ref 3.5–5.1)
PROT SERPL-MCNC: 7.3 G/DL (ref 6–8.4)
RBC # BLD AUTO: 4.66 M/UL (ref 4–5.4)
SODIUM SERPL-SCNC: 140 MMOL/L (ref 136–145)
TROPONIN I SERPL DL<=0.01 NG/ML-MCNC: <0.006 NG/ML (ref 0–0.03)
WBC # BLD AUTO: 10.55 K/UL (ref 3.9–12.7)

## 2025-02-02 PROCEDURE — 63600175 PHARM REV CODE 636 W HCPCS: Performed by: STUDENT IN AN ORGANIZED HEALTH CARE EDUCATION/TRAINING PROGRAM

## 2025-02-02 PROCEDURE — 93005 ELECTROCARDIOGRAM TRACING: CPT

## 2025-02-02 PROCEDURE — 96374 THER/PROPH/DIAG INJ IV PUSH: CPT

## 2025-02-02 PROCEDURE — 85025 COMPLETE CBC W/AUTO DIFF WBC: CPT | Performed by: STUDENT IN AN ORGANIZED HEALTH CARE EDUCATION/TRAINING PROGRAM

## 2025-02-02 PROCEDURE — 25000003 PHARM REV CODE 250: Performed by: STUDENT IN AN ORGANIZED HEALTH CARE EDUCATION/TRAINING PROGRAM

## 2025-02-02 PROCEDURE — 94760 N-INVAS EAR/PLS OXIMETRY 1: CPT

## 2025-02-02 PROCEDURE — 93010 ELECTROCARDIOGRAM REPORT: CPT | Mod: ,,, | Performed by: INTERNAL MEDICINE

## 2025-02-02 PROCEDURE — 36415 COLL VENOUS BLD VENIPUNCTURE: CPT | Performed by: STUDENT IN AN ORGANIZED HEALTH CARE EDUCATION/TRAINING PROGRAM

## 2025-02-02 PROCEDURE — 83735 ASSAY OF MAGNESIUM: CPT | Performed by: STUDENT IN AN ORGANIZED HEALTH CARE EDUCATION/TRAINING PROGRAM

## 2025-02-02 PROCEDURE — 84484 ASSAY OF TROPONIN QUANT: CPT | Performed by: STUDENT IN AN ORGANIZED HEALTH CARE EDUCATION/TRAINING PROGRAM

## 2025-02-02 PROCEDURE — 99285 EMERGENCY DEPT VISIT HI MDM: CPT | Mod: 25

## 2025-02-02 PROCEDURE — 80053 COMPREHEN METABOLIC PANEL: CPT | Performed by: STUDENT IN AN ORGANIZED HEALTH CARE EDUCATION/TRAINING PROGRAM

## 2025-02-02 PROCEDURE — 83880 ASSAY OF NATRIURETIC PEPTIDE: CPT | Performed by: STUDENT IN AN ORGANIZED HEALTH CARE EDUCATION/TRAINING PROGRAM

## 2025-02-02 RX ORDER — FUROSEMIDE 10 MG/ML
20 INJECTION INTRAMUSCULAR; INTRAVENOUS
Status: COMPLETED | OUTPATIENT
Start: 2025-02-02 | End: 2025-02-02

## 2025-02-02 RX ORDER — CHLORHEXIDINE GLUCONATE 40 MG/ML
SOLUTION TOPICAL DAILY PRN
Qty: 946 ML | Refills: 0 | Status: SHIPPED | OUTPATIENT
Start: 2025-02-02 | End: 2025-02-23

## 2025-02-02 RX ORDER — CLINDAMYCIN HYDROCHLORIDE 150 MG/1
300 CAPSULE ORAL EVERY 8 HOURS
Qty: 42 CAPSULE | Refills: 0 | Status: SHIPPED | OUTPATIENT
Start: 2025-02-02 | End: 2025-02-09

## 2025-02-02 RX ORDER — FUROSEMIDE 20 MG/1
20 TABLET ORAL DAILY PRN
Qty: 7 TABLET | Refills: 0 | Status: SHIPPED | OUTPATIENT
Start: 2025-02-02

## 2025-02-02 RX ADMIN — FUROSEMIDE 20 MG: 10 INJECTION, SOLUTION INTRAMUSCULAR; INTRAVENOUS at 03:02

## 2025-02-02 RX ADMIN — POTASSIUM BICARBONATE 40 MEQ: 391 TABLET, EFFERVESCENT ORAL at 04:02

## 2025-02-02 NOTE — DISCHARGE INSTRUCTIONS
Follow up with primary care physician within 1 week for repeat evaluation and repeat electrolytes and return to ED for any worsening symptoms.

## 2025-02-02 NOTE — ED PROVIDER NOTES
Encounter Date: 2/2/2025       History     Chief Complaint   Patient presents with    Abscess     Abscesses all over her body and lower leg and feet swelling      39-year-old female presents with 2 chief complaints.  First chief complaint is intermittent skin infection throughout extremities trunk and under arms.  Patient had 1 site on her thigh which open this morning and drained.  No systemic fever or chills.  2nd chief complaint is bilateral feet swelling.  No associated chest pain or shortness for breath.  Patient was on Lasix and currently off Lasix    The history is provided by the patient and medical records.     Review of patient's allergies indicates:   Allergen Reactions    Asa [aspirin]     Aspirin Other (See Comments)     avoids due to asthma    Ultram [tramadol]     Ultram [tramadol] Nausea And Vomiting    Zofran [ondansetron hcl (pf)]     Zofran [ondansetron hcl (pf)] Other (See Comments)     Gives migraines     Past Medical History:   Diagnosis Date    Anxiety     Asthma     Bipolar affective disorder, current episode manic with psychotic symptoms     Hypertension     Manic bipolar I disorder     Mental health disorder     PTSD (post-traumatic stress disorder)     Substance abuse      Past Surgical History:   Procedure Laterality Date    APPENDECTOMY      c sections      x 3    CHOLECYSTECTOMY      ESOPHAGOGASTRODUODENOSCOPY N/A 9/15/2022    Procedure: EGD (ESOPHAGOGASTRODUODENOSCOPY);  Surgeon: Law Sheriff MD;  [facility]: [facility];  Service: Endoscopy;  Laterality: N/A;    HYSTERECTOMY      TONSILLECTOMY      tonsils and adenoidectomy       Family History   Problem Relation Name Age of Onset    Ovarian cancer Mother      Cancer Other m great gm         breast    No Known Problems Father      Diabetes Paternal Grandmother       Social History     Tobacco Use    Smoking status: Every Day     Current packs/day: 0.50     Average packs/day: 0.5 packs/day for 20.1 years (10.0 ttl pk-yrs)     Types:  Cigarettes     Start date: 2005    Smokeless tobacco: Never   Substance Use Topics    Alcohol use: Never    Drug use: Yes     Types: Marijuana     Comment: daily     Review of Systems   All other systems reviewed and are negative.      Physical Exam     Initial Vitals [02/02/25 1434]   BP Pulse Resp Temp SpO2   135/65 95 18 98.3 °F (36.8 °C) 97 %      MAP       --         Physical Exam    Nursing note and vitals reviewed.  Constitutional: No distress.   HENT:   Head: Normocephalic.   Eyes: No scleral icterus.   Cardiovascular:  Normal rate.           Pulmonary/Chest: Effort normal. No stridor. No respiratory distress.   Breath sounds clear bilaterally   Abdominal: There is no guarding.   Musculoskeletal:      Comments: Bilateral foot and ankle swelling no asymmetrical lower extremity swelling     Neurological: She is alert.   Skin: No rash noted. There is erythema.   Erythema right medial thigh, under bilateral breast and left axilla, no definitive abscess to drain         ED Course   Procedures  Labs Reviewed   CBC W/ AUTO DIFFERENTIAL       Result Value    WBC 10.55      RBC 4.66      Hemoglobin 13.8      Hematocrit 42.6      MCV 91      MCH 29.6      MCHC 32.4      RDW 13.7      Platelets 393      MPV 9.2      Immature Granulocytes 0.4      Gran # (ANC) 5.9      Immature Grans (Abs) 0.04      Lymph # 3.7      Mono # 0.8      Eos # 0.1      Baso # 0.05      nRBC 0      Gran % 56.1      Lymph % 34.7      Mono % 7.4      Eosinophil % 0.9      Basophil % 0.5      Differential Method Automated     COMPREHENSIVE METABOLIC PANEL    Sodium 140      Potassium 3.9      Chloride 108      CO2 24      Glucose 98      BUN 12      Creatinine 0.8      Calcium 9.4      Total Protein 7.3      Albumin 3.7      Total Bilirubin 0.4      Alkaline Phosphatase 94      AST 15      ALT 21      eGFR >60      Anion Gap 8     TROPONIN I    Troponin I <0.006     MAGNESIUM    Magnesium 1.8     B-TYPE NATRIURETIC PEPTIDE          Imaging  Results              X-Ray Chest AP Portable (Final result)  Result time 02/02/25 15:30:50      Final result by Favio Badillo MD (02/02/25 15:30:50)                   Narrative:    EXAMINATION:  XR CHEST AP PORTABLE    CLINICAL HISTORY:  Chest Pain;    TECHNIQUE:  Single frontal view of the chest was performed.    COMPARISON:  12/28/2024    FINDINGS:  No new airspace disease.  Normal size heart.  No pleural effusion or pneumothorax.      Electronically signed by: Favio Badillo  Date:    02/02/2025  Time:    15:30                                     Medications   potassium bicarbonate disintegrating tablet 40 mEq (has no administration in time range)   furosemide injection 20 mg (20 mg Intravenous Given During Downtime 2/2/25 1511)     Medical Decision Making  39-year-old female presents with 2 chief complaints.  1st chief complaint is bilateral feet swelling, workup initiated with no significant evidence of heart failure or renal failure.  No oxygen requirement, no pulmonary edema patient administered IV Lasix and able to urinate discharged with oral Lasix.  Potassium 3.2 administer on orally here ED. second is recurrent skin infections.  Concern for hidradenitis.  No definitive abscess to drain, we will treat with topical and oral antibiotics.  Recommend follow up with PCP within 1 week for repeat electrolytes return to ED for any worsening symptoms.  Patient voiced understanding and agrees with plan.    Amount and/or Complexity of Data Reviewed  Labs: ordered. Decision-making details documented in ED Course.  Radiology: ordered and independent interpretation performed.     Details: No significant pleural effusion or pulmonary edema  ECG/medicine tests: ordered and independent interpretation performed.     Details: EKG rate 84, normal sinus rhythm, QRS 90, , no STEMI    Risk  OTC drugs.  Prescription drug management.               ED Course as of 02/02/25 1559   Sun Feb 02, 2025   1524 WBC: 10.55  [KB]   1524 Hemoglobin: 13.8 [KB]   1524 Hematocrit: 42.6 [KB]   1548 Magnesium : 1.8 [KB]   1548 Sodium: 140 [KB]   1548 Potassium: 3.9 [KB]   1548 Chloride: 108 [KB]   1548 CO2: 24 [KB]   1548 Glucose: 98 [KB]   1548 BUN: 12 [KB]   1548 Creatinine: 0.8 [KB]   1548 Calcium: 9.4 [KB]   1548 PROTEIN TOTAL: 7.3 [KB]   1548 Albumin: 3.7 [KB]   1548 BILIRUBIN TOTAL: 0.4 [KB]   1548 ALP: 94 [KB]   1548 AST: 15 [KB]   1548 ALT: 21 [KB]   1548 eGFR: >60 [KB]   1548 Anion Gap: 8 [KB]   1557 Troponin I: <0.006 [KB]      ED Course User Index  [KB] Rios Escobar Jr., DO                           Clinical Impression:  Final diagnoses:  [M79.89] Bilateral swelling of feet (Primary)  [L73.2] Hidradenitis          ED Disposition Condition    Discharge Stable          ED Prescriptions       Medication Sig Dispense Start Date End Date Auth. Provider    furosemide (LASIX) 20 MG tablet Take 1 tablet (20 mg total) by mouth daily as needed. 7 tablet 2/2/2025 -- Rios Escobar Jr., DO    clindamycin (CLEOCIN) 150 MG capsule Take 2 capsules (300 mg total) by mouth every 8 (eight) hours. for 7 days 42 capsule 2/2/2025 2/9/2025 Rios Escobar Jr., DO    chlorhexidine (HIBICLENS) 4 % external liquid Apply topically daily as needed. 946 mL 2/2/2025 2/23/2025 Rios Escobar Jr., DO          Follow-up Information    None          Rios Escobar Jr., DO  02/02/25 6571

## 2025-02-08 LAB
OHS QRS DURATION: 90 MS
OHS QTC CALCULATION: 434 MS

## 2025-04-10 ENCOUNTER — HOSPITAL ENCOUNTER (EMERGENCY)
Facility: HOSPITAL | Age: 40
Discharge: LEFT BEFORE DISCHARGE | End: 2025-04-10
Payer: COMMERCIAL

## 2025-04-10 VITALS
WEIGHT: 293 LBS | BODY MASS INDEX: 51.91 KG/M2 | HEART RATE: 89 BPM | TEMPERATURE: 98 F | RESPIRATION RATE: 20 BRPM | HEIGHT: 63 IN | SYSTOLIC BLOOD PRESSURE: 149 MMHG | OXYGEN SATURATION: 96 % | DIASTOLIC BLOOD PRESSURE: 94 MMHG

## 2025-04-10 DIAGNOSIS — M79.602 LEFT ARM PAIN: Primary | ICD-10-CM

## 2025-04-10 PROCEDURE — 99282 EMERGENCY DEPT VISIT SF MDM: CPT

## 2025-04-10 NOTE — FIRST PROVIDER EVALUATION
"Medical screening examination initiated.  I have conducted a focused provider triage encounter, findings are as follows:    Brief history of present illness:  left forearm crushed between elevator doors today.  Having pain an dswelling and bruising to L forearm. Tingling in fingertips. No weakness.     Vitals:    04/10/25 1208   BP: (!) 149/94   Pulse: 89   Resp: 20   Temp: 98.1 °F (36.7 °C)   TempSrc: Oral   SpO2: 96%   Weight: (!) 186 kg (410 lb 0.9 oz)   Height: 5' 3" (1.6 m)       Pertinent physical exam:  L radial pulse intact  5/5 strength in LUE , wrist flexion/extension, thumb extension, pincer, finger abduction and adduction.  Sensation intact in median, ulnar, radial nerve distribution  No bony deformity  Soft tissue swellign and bruisign to dorsal mid forearm. No open wounds    Brief workup plan:  advised xray and that we cannot confirm or rule out fracture without it.  Otherwise neurovascularly intact. Patient does not want to stay for xrays. States she is going to hospital in [place]    Preliminary workup initiated; this workup will be continued and followed by the physician or advanced practice provider that is assigned to the patient when roomed.  "

## 2025-05-06 NOTE — CONSULTS
Acetaminophen (Tylenol) 160mg per 5 mls: Give 9 mls every 4 hours as needed for fever or pain.  Ibuprofen (Children's Advil) 100mg per 5mls: Give 9 mls every 6 hours as needed for fever or pain.     Peripheral 20 ga 2.5 inches placed in LFA, with ultrasound guidance.

## 2025-05-18 NOTE — ASSESSMENT & PLAN NOTE
- Presenting to the ED with a chief complaint of shortness of breath, found to be in asthma exacerbation  - Multiple nebulization treatments, lorazepam, Mag sulfate, and methylprednisolone given.  - Ellen scheduled  - Methylprednisolone IV, pulmonology tapering the dose  - COVID and flu negative  - Respiratory panel negative  - Pulmonology consulted, appreciate recs  - Respiratory eval and treat, appreciate recs  - improved respiratory status, BiPAP support as needed  - repeat ABG-unable to obtain with her body habitus, VBG ordered  - if she worsens, she will eventually require intubation   show

## 2025-06-02 ENCOUNTER — HOSPITAL ENCOUNTER (EMERGENCY)
Facility: HOSPITAL | Age: 40
Discharge: LEFT BEFORE DISCHARGE | End: 2025-06-02
Attending: EMERGENCY MEDICINE
Payer: COMMERCIAL

## 2025-06-02 VITALS
RESPIRATION RATE: 22 BRPM | HEIGHT: 63 IN | OXYGEN SATURATION: 96 % | BODY MASS INDEX: 51.91 KG/M2 | TEMPERATURE: 98 F | HEART RATE: 85 BPM | SYSTOLIC BLOOD PRESSURE: 153 MMHG | DIASTOLIC BLOOD PRESSURE: 107 MMHG | WEIGHT: 293 LBS

## 2025-06-02 DIAGNOSIS — L02.91 ABSCESS: Primary | ICD-10-CM

## 2025-06-02 PROCEDURE — 63600175 PHARM REV CODE 636 W HCPCS: Performed by: PHYSICIAN ASSISTANT

## 2025-06-02 PROCEDURE — 99283 EMERGENCY DEPT VISIT LOW MDM: CPT

## 2025-06-02 PROCEDURE — 25000003 PHARM REV CODE 250: Performed by: PHYSICIAN ASSISTANT

## 2025-06-02 RX ORDER — SULFAMETHOXAZOLE AND TRIMETHOPRIM 800; 160 MG/1; MG/1
1 TABLET ORAL
Status: COMPLETED | OUTPATIENT
Start: 2025-06-02 | End: 2025-06-02

## 2025-06-02 RX ORDER — LIDOCAINE HYDROCHLORIDE 10 MG/ML
10 INJECTION, SOLUTION INFILTRATION; PERINEURAL
Status: COMPLETED | OUTPATIENT
Start: 2025-06-02 | End: 2025-06-02

## 2025-06-02 RX ORDER — IBUPROFEN 600 MG/1
600 TABLET, FILM COATED ORAL
Status: COMPLETED | OUTPATIENT
Start: 2025-06-02 | End: 2025-06-02

## 2025-06-02 RX ADMIN — SULFAMETHOXAZOLE AND TRIMETHOPRIM 1 TABLET: 800; 160 TABLET ORAL at 01:06

## 2025-06-02 RX ADMIN — IBUPROFEN 600 MG: 600 TABLET ORAL at 01:06

## 2025-06-02 RX ADMIN — LIDOCAINE HYDROCHLORIDE 10 ML: 10 INJECTION, SOLUTION INFILTRATION; PERINEURAL at 01:06

## 2025-06-02 NOTE — ED NOTES
Assumed care:  Ifrah Esquivel is awake, alert and oriented x 3, skin warm and dry, in NAD.  CO abscess to right flank, states she noticed it yesterday.    Patient identifiers for Ifrah Esquivel checked and correct.  LOC:  Ifrah Esquivel is awake, alert, and aware of environment with an appropriate affect. She is oriented x 3 and speaking appropriately.  APPEARANCE:  She is resting comfortably and in no acute distress. She is clean and well groomed, patient's clothing is properly fastened.  SKIN:  The skin is warm and dry. She has normal skin turgor and moist mucus membranes. abscess  MUSCULOSKELETAL:  She is moving all extremities well, no obvious deformities noted. Pulses intact.   RESPIRATORY:  Airway is open and patent. Respirations are spontaneous and non-labored with normal effort and rate.  CARDIAC:  She has a normal rate and rhythm. No peripheral edema noted. Capillary refill < 3 seconds.  ABDOMEN:  No distention noted.  Soft and non-tender upon palpation.  NEUROLOGICAL:  PERRL. Facial expression is symmetrical. Hand grasps are equal bilaterally. Normal sensation in all extremities when touched with finger.  Allergies reported:    Review of patient's allergies indicates:   Allergen Reactions    Asa [aspirin]     Aspirin Other (See Comments)     avoids due to asthma    Ultram [tramadol]     Ultram [tramadol] Nausea And Vomiting    Zofran [ondansetron hcl (pf)]     Zofran [ondansetron hcl (pf)] Other (See Comments)     Gives migraines

## 2025-06-02 NOTE — ED PROVIDER NOTES
Encounter Date: 6/2/2025       History     Chief Complaint   Patient presents with    Abscess     To right side under axilla fold      Ifrah Esquivel is a 39 y.o. female presenting for evaluation of redness, swelling and pain to her right sided flank, persisting and worsening over the last couple of days.  She hasn't noticed any drainage or bleeding from the area.  She states she had an abscess in the same location previously that required I&D.  No fever.  She has a past medical history of Anxiety, Asthma, Bipolar affective disorder, current episode manic with psychotic symptoms, Hypertension, Manic bipolar I disorder, Mental health disorder, PTSD (post-traumatic stress disorder), and Substance abuse.      The history is provided by the patient.     Review of patient's allergies indicates:   Allergen Reactions    Asa [aspirin]     Aspirin Other (See Comments)     avoids due to asthma    Ultram [tramadol]     Ultram [tramadol] Nausea And Vomiting    Zofran [ondansetron hcl (pf)]     Zofran [ondansetron hcl (pf)] Other (See Comments)     Gives migraines     Past Medical History:   Diagnosis Date    Anxiety     Asthma     Bipolar affective disorder, current episode manic with psychotic symptoms     Hypertension     Manic bipolar I disorder     Mental health disorder     PTSD (post-traumatic stress disorder)     Substance abuse      Past Surgical History:   Procedure Laterality Date    APPENDECTOMY      c sections      x 3    CHOLECYSTECTOMY      ESOPHAGOGASTRODUODENOSCOPY N/A 9/15/2022    Procedure: EGD (ESOPHAGOGASTRODUODENOSCOPY);  Surgeon: Law Sheriff MD;  Location: [facility];  Service: Endoscopy;  Laterality: N/A;    HYSTERECTOMY      TONSILLECTOMY      tonsils and adenoidectomy       Family History   Problem Relation Name Age of Onset    Ovarian cancer Mother      Cancer Other m great gm         breast    No Known Problems Father      Diabetes Paternal Grandmother       Social History[1]  Review of Systems    Constitutional:  Negative for chills and fever.   Musculoskeletal:  Negative for arthralgias, back pain, joint swelling, myalgias, neck pain and neck stiffness.   Skin:  Positive for wound. Negative for color change, pallor and rash.   Neurological:  Negative for weakness and numbness.   Hematological:  Does not bruise/bleed easily.       Physical Exam     Initial Vitals [06/02/25 1316]   BP Pulse Resp Temp SpO2   (!) 153/107 85 (!) 22 97.8 °F (36.6 °C) 96 %      MAP       --         Physical Exam    Nursing note and vitals reviewed.  Constitutional: She appears well-developed and well-nourished. She is not diaphoretic. No distress.   HENT:   Head: Normocephalic and atraumatic.   Cardiovascular:  Intact distal pulses.           Musculoskeletal:         General: Tenderness present. Normal range of motion.     Neurological: She is alert and oriented to person, place, and time. She has normal strength. No sensory deficit.   Skin: Skin is warm and dry. Abscess noted. No rash noted. There is erythema.   Moderately sized area of erythema and induration with central fluctuance noted to right sided flank.  No bleeding or discharge.     Psychiatric: She has a normal mood and affect.         ED Course   Procedures  Labs Reviewed - No data to display         Imaging Results    None          Medications   LIDOcaine HCL 10 mg/ml (1%) injection 10 mL (10 mLs Infiltration Given 6/2/25 1354)   sulfamethoxazole-trimethoprim 800-160mg per tablet 1 tablet (1 tablet Oral Given 6/2/25 1354)   ibuprofen tablet 600 mg (600 mg Oral Given 6/2/25 1354)     Medical Decision Making  Differential Diagnosis:   Abscess  Cellulitis   Folliculitis     Pt emergently evaluated here in the ED.    Symptoms consistent with underlying abscess; however, she eloped prior to having I&D performed. She did get a dose of Bactrim, Motrin here in the ED.                                        Clinical Impression:  Final diagnoses:  [L02.91] Abscess (Primary)           ED Disposition Condition    Eloped                       [1]   Social History  Tobacco Use    Smoking status: Every Day     Current packs/day: 0.50     Average packs/day: 0.5 packs/day for 20.4 years (10.2 ttl pk-yrs)     Types: Cigarettes     Start date: 2005    Smokeless tobacco: Never   Substance Use Topics    Alcohol use: Never    Drug use: Yes     Types: Marijuana     Comment: daily        Lelo Boo PA-C  06/02/25 1912

## 2025-06-02 NOTE — ED NOTES
Pt refused to sign AMA form. Pt walked out. Would not allow me to get last set of vitals. Pt was upset. States we are taking to long,